# Patient Record
Sex: MALE | Race: WHITE | Employment: FULL TIME | URBAN - METROPOLITAN AREA
[De-identification: names, ages, dates, MRNs, and addresses within clinical notes are randomized per-mention and may not be internally consistent; named-entity substitution may affect disease eponyms.]

---

## 2020-06-05 ENCOUNTER — HOSPITAL ENCOUNTER (OUTPATIENT)
Dept: ULTRASOUND IMAGING | Age: 65
Discharge: HOME | End: 2020-06-07
Payer: COMMERCIAL

## 2020-06-05 ENCOUNTER — HOSPITAL ENCOUNTER (OUTPATIENT)
Age: 65
Discharge: HOME | End: 2020-06-07
Payer: COMMERCIAL

## 2020-06-05 PROCEDURE — 76536 US EXAM OF HEAD AND NECK: CPT

## 2020-06-30 ENCOUNTER — TELEPHONE (OUTPATIENT)
Dept: CARDIOLOGY | Age: 65
End: 2020-06-30

## 2020-07-01 ENCOUNTER — HOSPITAL ENCOUNTER (OUTPATIENT)
Dept: CARDIOLOGY | Age: 65
Discharge: HOME | End: 2020-07-01
Payer: COMMERCIAL

## 2020-07-01 LAB
LV EF: 65 %
LVEF MODALITY: NORMAL

## 2020-07-01 PROCEDURE — 93306 TTE W/DOPPLER COMPLETE: CPT | Performed by: PSYCHIATRY & NEUROLOGY

## 2020-07-28 ENCOUNTER — OFFICE VISIT (OUTPATIENT)
Dept: CARDIOLOGY CLINIC | Age: 65
End: 2020-07-28
Payer: COMMERCIAL

## 2020-07-28 VITALS
HEART RATE: 56 BPM | HEIGHT: 70 IN | BODY MASS INDEX: 23.48 KG/M2 | SYSTOLIC BLOOD PRESSURE: 114 MMHG | RESPIRATION RATE: 18 BRPM | WEIGHT: 164 LBS | DIASTOLIC BLOOD PRESSURE: 76 MMHG

## 2020-07-28 PROBLEM — I35.0 NONRHEUMATIC AORTIC VALVE STENOSIS: Status: ACTIVE | Noted: 2020-07-28

## 2020-07-28 PROCEDURE — 99244 OFF/OP CNSLTJ NEW/EST MOD 40: CPT | Performed by: INTERNAL MEDICINE

## 2020-07-28 PROCEDURE — 93000 ELECTROCARDIOGRAM COMPLETE: CPT | Performed by: INTERNAL MEDICINE

## 2020-07-28 RX ORDER — FLUTICASONE PROPIONATE 50 MCG
SPRAY, SUSPENSION (ML) NASAL
COMMUNITY
Start: 2020-07-16 | End: 2021-08-26 | Stop reason: CLARIF

## 2020-07-28 RX ORDER — ADALIMUMAB 40MG/0.4ML
KIT SUBCUTANEOUS
COMMUNITY
Start: 2020-07-10 | End: 2021-08-26 | Stop reason: CLARIF

## 2020-07-28 RX ORDER — AZELASTINE 1 MG/ML
SPRAY, METERED NASAL
COMMUNITY
Start: 2020-07-16 | End: 2021-08-26 | Stop reason: CLARIF

## 2020-07-28 RX ORDER — VITAMIN B COMPLEX
1 CAPSULE ORAL PRN
COMMUNITY
End: 2022-04-11 | Stop reason: CLARIF

## 2020-07-28 NOTE — PROGRESS NOTES
Patient Active Problem List   Diagnosis    Nonrheumatic aortic valve stenosis       Current Outpatient Medications   Medication Sig Dispense Refill    HUMIRA PEN 40 MG/0.4ML PNKT every 14 days       fluticasone (FLONASE) 50 MCG/ACT nasal spray SPRAY 2 SPRAYS INTO EACH NOSTRIL EVERY DAY      azelastine (ASTELIN) 0.1 % nasal spray USE 2 SPRAYS/NOSTRIL TWICE DAILY      b complex vitamins capsule Take 1 capsule by mouth as needed Only takes occasionally       No current facility-administered medications for this visit. CC:    Patient is seen in Initial Evaluation for:  1. Nonrheumatic aortic valve stenosis        HPI:  Patient seen and initial evaluation after recent finding of moderate to severe aortic valve stenosis on echocardiography. Patient was found to have murmur of significant aortic valve stenosis on examination. Patient is doing well without any specific cardiac problems. Patient denies any shortness of breath, chest pain, lightheadedness or dizziness. Patient is tolerating medications well without side effects.       ROS:   General: No unusual weight gain, no change in exercise tolerance  Skin: No rash or itching  EENT: No vision changes or nosebleeds  Cardiovascular: No orthopnea or paroxysmal nocturnal dyspnea  Respiratory: No cough or hemoptysis  Gastrointestinal: No hematemesis or recent changes in bowel habits  Genitourinary: No hematuria, urgency or frequency  Musculoskeletal: No muscular weakness or joint swelling   Neurologic / Psychiatric: No incoordination or convulsions  Allergic / Immunologic/ Lymphatic / Endocrine: No anemia or bleeding tendency    Social History     Socioeconomic History    Marital status: Single     Spouse name: Not on file    Number of children: Not on file    Years of education: Not on file    Highest education level: Not on file   Occupational History    Not on file   Social Needs    Financial resource strain: Not on file    Food insecurity     Worry: Not on file     Inability: Not on file    Transportation needs     Medical: Not on file     Non-medical: Not on file   Tobacco Use    Smoking status: Current Some Day Smoker     Years: 20.00     Types: Cigars    Smokeless tobacco: Never Used    Tobacco comment: occasionally a cigar   Substance and Sexual Activity    Alcohol use: Yes     Comment: occasionally beer during week    Drug use: Never    Sexual activity: Not on file   Lifestyle    Physical activity     Days per week: Not on file     Minutes per session: Not on file    Stress: Not on file   Relationships    Social connections     Talks on phone: Not on file     Gets together: Not on file     Attends Jew service: Not on file     Active member of club or organization: Not on file     Attends meetings of clubs or organizations: Not on file     Relationship status: Not on file    Intimate partner violence     Fear of current or ex partner: Not on file     Emotionally abused: Not on file     Physically abused: Not on file     Forced sexual activity: Not on file   Other Topics Concern    Not on file   Social History Narrative    Not on file       Family History   Problem Relation Age of Onset    Cancer Mother         breast    Heart Disease Father      PMH:  Psoriasis    PHYSICAL EXAM:  CONSTITUTIONAL:  Well developed, well nourished    Vitals:    07/28/20 0755   BP: 114/76   Pulse: 56   Resp: 18   Weight: 164 lb (74.4 kg)   Height: 5' 10\" (1.778 m)     HEAD & FACE: Normocephalic. Symmetric. EYES: No xanthelasma. Conjunctivae not injected. EARS, NOSE, MOUTH & THROAT: Good dentition. No oral pallor or cyanosis. NECK: No JVD at 30 degrees. No thyromegaly. RESPIRATORY: Clear to auscultation and percussion in all fields. No use of accessory muscle or intercostal retractions. CARDIOVASCULAR: Regular rate and rhythm. No lifts or thrills on palpitation. Auscultation with normal S1-S2 in intensity and splitting.   Grade 2/6 late peaking

## 2021-02-26 ENCOUNTER — OFFICE VISIT (OUTPATIENT)
Dept: CARDIOLOGY CLINIC | Age: 66
End: 2021-02-26
Payer: COMMERCIAL

## 2021-02-26 VITALS
HEART RATE: 69 BPM | RESPIRATION RATE: 16 BRPM | BODY MASS INDEX: 24.45 KG/M2 | DIASTOLIC BLOOD PRESSURE: 64 MMHG | SYSTOLIC BLOOD PRESSURE: 112 MMHG | HEIGHT: 70 IN | OXYGEN SATURATION: 97 % | WEIGHT: 170.8 LBS

## 2021-02-26 DIAGNOSIS — I35.0 NONRHEUMATIC AORTIC VALVE STENOSIS: Primary | ICD-10-CM

## 2021-02-26 PROCEDURE — 99213 OFFICE O/P EST LOW 20 MIN: CPT | Performed by: INTERNAL MEDICINE

## 2021-02-26 PROCEDURE — 93000 ELECTROCARDIOGRAM COMPLETE: CPT | Performed by: INTERNAL MEDICINE

## 2021-02-26 NOTE — PROGRESS NOTES
Patient Active Problem List   Diagnosis    Nonrheumatic aortic valve stenosis       Current Outpatient Medications   Medication Sig Dispense Refill    HUMIRA PEN 40 MG/0.4ML PNKT every 14 days       b complex vitamins capsule Take 1 capsule by mouth as needed Only takes occasionally      fluticasone (FLONASE) 50 MCG/ACT nasal spray SPRAY 2 SPRAYS INTO EACH NOSTRIL EVERY DAY      azelastine (ASTELIN) 0.1 % nasal spray USE 2 SPRAYS/NOSTRIL TWICE DAILY       No current facility-administered medications for this visit. CC:    Patient is seen in Initial Evaluation for:  1. Nonrheumatic aortic valve stenosis        HPI:  Patient is doing well without any specific cardiac problems. Patient denies any shortness of breath, chest pain, lightheadedness or dizziness. Patient is tolerating medications well without side effects.       ROS:   General: No unusual weight gain, no change in exercise tolerance  Skin: No rash or itching  EENT: No vision changes or nosebleeds  Cardiovascular: No orthopnea or paroxysmal nocturnal dyspnea  Respiratory: No cough or hemoptysis  Gastrointestinal: No hematemesis or recent changes in bowel habits  Genitourinary: No hematuria, urgency or frequency  Musculoskeletal: No muscular weakness or joint swelling   Neurologic / Psychiatric: No incoordination or convulsions  Allergic / Immunologic/ Lymphatic / Endocrine: No anemia or bleeding tendency    Social History     Socioeconomic History    Marital status: Single     Spouse name: Not on file    Number of children: Not on file    Years of education: Not on file    Highest education level: Not on file   Occupational History    Not on file   Social Needs    Financial resource strain: Not on file    Food insecurity     Worry: Not on file     Inability: Not on file    Transportation needs     Medical: Not on file     Non-medical: Not on file   Tobacco Use    Smoking status: Current Some Day Smoker     Years: 20.00     Types: Cigars  Smokeless tobacco: Never Used    Tobacco comment: occasionally a cigar   Substance and Sexual Activity    Alcohol use: Yes     Comment: occasionally beer during week    Drug use: Never    Sexual activity: Not on file   Lifestyle    Physical activity     Days per week: Not on file     Minutes per session: Not on file    Stress: Not on file   Relationships    Social connections     Talks on phone: Not on file     Gets together: Not on file     Attends Yazdanism service: Not on file     Active member of club or organization: Not on file     Attends meetings of clubs or organizations: Not on file     Relationship status: Not on file    Intimate partner violence     Fear of current or ex partner: Not on file     Emotionally abused: Not on file     Physically abused: Not on file     Forced sexual activity: Not on file   Other Topics Concern    Not on file   Social History Narrative    Not on file       Family History   Problem Relation Age of Onset    Cancer Mother         breast    Heart Disease Father      PMH:  Psoriasis    PHYSICAL EXAM:  CONSTITUTIONAL:  Well developed, well nourished    Vitals:    02/26/21 1349   BP: 112/64   Pulse: 69   Resp: 16   SpO2: 97%   Weight: 170 lb 12.8 oz (77.5 kg)   Height: 5' 10\" (1.778 m)     HEAD & FACE: Normocephalic. Symmetric. EYES: No xanthelasma. Conjunctivae not injected. EARS, NOSE, MOUTH & THROAT: Good dentition. No oral pallor or cyanosis. NECK: No JVD at 30 degrees. No thyromegaly. RESPIRATORY: Clear to auscultation and percussion in all fields. No use of accessory muscle or intercostal retractions. CARDIOVASCULAR: Regular rate and rhythm. No lifts or thrills on palpitation. Auscultation with normal S1-S2 in intensity and splitting. Grade 2/6 late peaking systolic murmur noted best at right upper sternal border. No carotid bruits. Abdominal aorta not enlarged. Femoral arteries without bruits. Pedal pulses 2+. No edema.     ABDOMEN: Soft without hepatic or splenic enlargement. No tenderness. MUSCULOSKELETAL: No kyphosis or scoliosis of the back. Good muscle strength and tone. No muscle atrophy. Normal gait and ability to undergo exercise stress testing. EXTREMITIES: No clubbing or cyanosis. SKIN: No Xanthomas or ulcerations. NEUROLOGIC: Oriented to time, place and person. Normal mood and affect. LYMPHATIC:  No palpable neck or supraclavicular nodes. No splenomegaly. EKG: the EKG tracing was reviewed and found to reveal: Normal sinus rhythm. ASSESSMENT:                                                     ORDERS:     Diagnosis Orders   1. Nonrheumatic aortic valve stenosis  EKG 12 Lead     PLAN:   See above orders. Medication reconciliation completed. Discussed issues that would prompt earlier evaluation. Same cardiac medications. Follow-up office visit in 6 month.

## 2021-08-26 ENCOUNTER — OFFICE VISIT (OUTPATIENT)
Dept: CARDIOLOGY CLINIC | Age: 66
End: 2021-08-26
Payer: COMMERCIAL

## 2021-08-26 VITALS
BODY MASS INDEX: 23.1 KG/M2 | WEIGHT: 165 LBS | HEART RATE: 58 BPM | RESPIRATION RATE: 14 BRPM | DIASTOLIC BLOOD PRESSURE: 68 MMHG | SYSTOLIC BLOOD PRESSURE: 118 MMHG | HEIGHT: 71 IN

## 2021-08-26 DIAGNOSIS — I35.0 NONRHEUMATIC AORTIC VALVE STENOSIS: Primary | ICD-10-CM

## 2021-08-26 PROCEDURE — 99213 OFFICE O/P EST LOW 20 MIN: CPT | Performed by: INTERNAL MEDICINE

## 2021-08-26 PROCEDURE — 93000 ELECTROCARDIOGRAM COMPLETE: CPT | Performed by: INTERNAL MEDICINE

## 2021-08-26 NOTE — PROGRESS NOTES
Patient Active Problem List   Diagnosis    Nonrheumatic aortic valve stenosis       Current Outpatient Medications   Medication Sig Dispense Refill    b complex vitamins capsule Take 1 capsule by mouth as needed Only takes occasionally      HUMIRA PEN 40 MG/0.4ML PNKT every 14 days  (Patient not taking: Reported on 8/26/2021)      fluticasone (FLONASE) 50 MCG/ACT nasal spray SPRAY 2 SPRAYS INTO EACH NOSTRIL EVERY DAY (Patient not taking: Reported on 8/26/2021)      azelastine (ASTELIN) 0.1 % nasal spray USE 2 SPRAYS/NOSTRIL TWICE DAILY (Patient not taking: Reported on 8/26/2021)       No current facility-administered medications for this visit. CC:    Patient is seen in follow up for:  1. Nonrheumatic aortic valve stenosis - moderate to severe        HPI:  Patient is doing well without any specific cardiac problems. Patient denies any shortness of breath, chest pain, lightheadedness or dizziness. Patient is tolerating medications well without side effects.       ROS:   General: No unusual weight gain, no change in exercise tolerance  Skin: No rash or itching  EENT: No vision changes or nosebleeds  Cardiovascular: No orthopnea or paroxysmal nocturnal dyspnea  Respiratory: No cough or hemoptysis  Gastrointestinal: No hematemesis or recent changes in bowel habits  Genitourinary: No hematuria, urgency or frequency  Musculoskeletal: No muscular weakness or joint swelling   Neurologic / Psychiatric: No incoordination or convulsions  Allergic / Immunologic/ Lymphatic / Endocrine: No anemia or bleeding tendency    Social History     Socioeconomic History    Marital status: Single     Spouse name: Not on file    Number of children: Not on file    Years of education: Not on file    Highest education level: Not on file   Occupational History    Not on file   Tobacco Use    Smoking status: Current Some Day Smoker     Years: 20.00     Types: Cigars    Smokeless tobacco: Never Used    Tobacco comment: occasionally a cigar   Vaping Use    Vaping Use: Never used   Substance and Sexual Activity    Alcohol use: Yes     Comment: occasionally beer during week    Drug use: Never    Sexual activity: Not on file   Other Topics Concern    Not on file   Social History Narrative    Not on file     Social Determinants of Health     Financial Resource Strain:     Difficulty of Paying Living Expenses:    Food Insecurity:     Worried About Running Out of Food in the Last Year:     [address] in the Last Year:    Transportation Needs:     Lack of Transportation (Medical):  Lack of Transportation (Non-Medical):    Physical Activity:     Days of Exercise per Week:     Minutes of Exercise per Session:    Stress:     Feeling of Stress :    Social Connections:     Frequency of Communication with Friends and Family:     Frequency of Social Gatherings with Friends and Family:     Attends Mandaeism Services:     Active Member of Clubs or Organizations:     Attends Club or Organization Meetings:     Marital Status:    Intimate Partner Violence:     Fear of Current or Ex-Partner:     Emotionally Abused:     Physically Abused:     Sexually Abused:        Family History   Problem Relation Age of Onset    Cancer Mother         breast    Heart Disease Father      PMH:  Psoriasis    PHYSICAL EXAM:  CONSTITUTIONAL:  Well developed, well nourished    Vitals:    08/26/21 0922   BP: 118/68   Pulse: 58   Resp: 14   Weight: 165 lb (74.8 kg)   Height: 5' 11\" (1.803 m)     HEAD & FACE: Normocephalic. Symmetric. EYES: No xanthelasma. Conjunctivae not injected. EARS, NOSE, MOUTH & THROAT: Good dentition. No oral pallor or cyanosis. NECK: No JVD at 30 degrees. No thyromegaly. RESPIRATORY: Clear to auscultation and percussion in all fields. No use of accessory muscle or intercostal retractions. CARDIOVASCULAR: Regular rate and rhythm. No lifts or thrills on palpitation.   Auscultation with normal S1-S2 in intensity and splitting. Grade 2/6 late peaking systolic murmur noted best at right upper sternal border. No carotid bruits. Abdominal aorta not enlarged. Femoral arteries without bruits. Pedal pulses 2+. No edema. ABDOMEN: Soft without hepatic or splenic enlargement. No tenderness. MUSCULOSKELETAL: No kyphosis or scoliosis of the back. Good muscle strength and tone. No muscle atrophy. Normal gait and ability to undergo exercise stress testing. EXTREMITIES: No clubbing or cyanosis. SKIN: No Xanthomas or ulcerations. NEUROLOGIC: Oriented to time, place and person. Normal mood and affect. LYMPHATIC:  No palpable neck or supraclavicular nodes. No splenomegaly. EKG: the EKG tracing was reviewed and found to reveal: Normal sinus rhythm.  ms. No change compared to prior tracing. ASSESSMENT:                                                     ORDERS:     Diagnosis Orders   1. Nonrheumatic aortic valve stenosis - moderate to severe  EKG 12 lead    ECHO COMPLETE     PLAN:   See above orders. Medication reconciliation completed. Discussed issues that would prompt earlier evaluation. Same cardiac medications. Follow-up office visit in 12 month.

## 2021-11-30 ENCOUNTER — TELEPHONE (OUTPATIENT)
Dept: CARDIOLOGY | Age: 66
End: 2021-11-30

## 2021-11-30 NOTE — TELEPHONE ENCOUNTER
CALLED PATIENT AND LEFT MESSAGE TO SCHEDULE ECHO  Electronically signed by Teo Isabel on 11/30/2021 at 11:57 AM

## 2021-12-15 DIAGNOSIS — I35.0 NONRHEUMATIC AORTIC VALVE STENOSIS: Primary | ICD-10-CM

## 2021-12-16 ENCOUNTER — TELEPHONE (OUTPATIENT)
Dept: CARDIOLOGY | Age: 66
End: 2021-12-16

## 2021-12-16 NOTE — TELEPHONE ENCOUNTER
CALLED PATIENT TO SCHEDULE TREADMILL STRESS TEST FOR 12-17-21. REVIEWED COVID CHECKLIST WITH PATIENT.     Electronically signed by Ilia Bear on 12/16/2021 at 8:55 AM

## 2021-12-17 ENCOUNTER — HOSPITAL ENCOUNTER (OUTPATIENT)
Dept: CARDIOLOGY | Age: 66
Discharge: HOME | End: 2021-12-17
Payer: COMMERCIAL

## 2021-12-17 DIAGNOSIS — I35.0 NONRHEUMATIC AORTIC VALVE STENOSIS: ICD-10-CM

## 2021-12-17 DIAGNOSIS — I35.0 NONRHEUMATIC AORTIC VALVE STENOSIS: Primary | ICD-10-CM

## 2021-12-17 LAB
LV EF: 60 %
LVEF MODALITY: NORMAL

## 2021-12-17 PROCEDURE — 93306 TTE W/DOPPLER COMPLETE: CPT

## 2022-01-11 NOTE — PROGRESS NOTES
[facility] CLIN[facility] Valve Clinic  Visit Note      Patient name: Arnel Don    Reason for consult: Non rheumatic aortic valve stenosis     Referring Physician:     Primary Care Physician: Ysabel Osborne MD    Date of service: 1/11/2022    Chief Complaint: AS    HPI: Mr. Taylor Robertson is a 61year old male with past medical history of  Aortic stenosis and psoriasis presents to the valve clinic for evaluation and recommendation of his non rheumatic aortic valve stenosis. 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 Patient currently denies any specific cardiac problems. Patient denies any shortness of breath, chest pain, lightheadedness or dizziness, pre-syncope or syncope or palpitations. Allergies: No Known Allergies    Home medications:    Current Outpatient Medications   Medication Sig Dispense Refill    b complex vitamins capsule Take 1 capsule by mouth as needed Only takes occasionally       No current facility-administered medications for this visit.        Past Medical History:  Past Medical History:   Diagnosis Date    Aortic stenosis 2021       Past Surgical History:  Past Surgical History:   Procedure Laterality Date    HERNIA REPAIR      8-10 years ago ([id number])       Social History:  Social History     Socioeconomic History    Marital status: Single     Spouse name: Not on file    Number of children: Not on file    Years of education: Not on file    Highest education level: Not on file   Occupational History    Not on file   Tobacco Use    Smoking status: Current Some Day Smoker     Years: 20.00     Types: Cigars    Smokeless tobacco: Never Used    Tobacco comment: occasionally a cigar   Vaping Use    Vaping Use: Never used   Substance and Sexual Activity    Alcohol use: Yes     Comment: occasionally beer during week    Drug use: Never    Sexual activity: Not on file   Other Topics Concern    Not on file   Social History Narrative    Not on file     Social Determinants of Health     Financial Resource Strain:     Difficulty of Paying Living Expenses: Not on file   Food Insecurity:     Worried About Running Out of Food in the Last Year: Not on file    Yael of Food in the Last Year: Not on file   Transportation Needs:     Lack of Transportation (Medical): Not on file    Lack of Transportation (Non-Medical): Not on file   Physical Activity:     Days of Exercise per Week: Not on file    Minutes of Exercise per Session: Not on file   Stress:     Feeling of Stress : Not on file   Social Connections:     Frequency of Communication with Friends and Family: Not on file    Frequency of Social Gatherings with Friends and Family: Not on file    Attends Mandaeism Services: Not on file    Active Member of [address] [place] [address] or Organizations: Not on file    Attends Club or Organization Meetings: Not on file    Marital Status: Not on file   Intimate Partner Violence:     Fear of Current or Ex-Partner: Not on file    Emotionally Abused: Not on file    Physically Abused: Not on file    Sexually Abused: Not on file   Housing Stability:     Unable to Pay for Housing in the Last Year: Not on file    Number of [place] in the Last Year: Not on file    Unstable Housing in the Last Year: Not on file       Family History:  Family History   Problem Relation Age of Onset    Cancer Mother         breast    Heart Disease Father        Review of Systems:  Constitutional: Denies fevers, chills, or weight loss. HEENT: Denies visual changes or hearing loss. Heart: As per HPI. Lungs: Denies shortness of breath, cough, or wheezing. Gastrointestinal: Denies nausea, vomiting, constipation, or diarrhea.    Genitourinary: dysuria or hematuria. Psychiatric: Patient denies anxiety or depression. Neurologic: Patient denies weakness of the extremities, dizziness, or headaches. All other ROS checked and found to be negative. Objective:  General Appearance: Pleasant 61y.o. year old male who appears stated age. Communicates well, no acute distress. HEENT: Head is normocephalic, atraumatic. EOMs intact, PERRL. Trachea midline. Lungs: Normal respiratory rate and normal effort. He is not in respiratory distress. Breath sounds clear to auscultation. No wheezes. Heart: Normal rate. Regular rhythm. S1 normal and S2 normal. Positive for murmur. Chest: Symmetric chest wall expansion. Extremities: Normal range of motion. Neurological: Patient is alert and oriented to person, place and time. Patient has normal reflexes. Skin: Warm and dry. Abdomen: Abdomen is soft and non-distended. Bowel sounds are normal. There is no abdominal tenderness tenderness. There is no guarding. There is no mass. Pulses: Distal pulses are intact. Skin: Warm and dry without lesions. Assessment:   Patient Active Problem List   Diagnosis    Nonrheumatic aortic valve stenosis               Plan: After extensive questioning he does admit to be slowing down and cannot do what he was doing 2-3 years ago and he attributed that to being 61. Heart cath, dental clinic (he states several teeth will need pulled). Likely will be best served with SAVR but will defer to the heart team discussion.

## 2022-01-13 ENCOUNTER — OFFICE VISIT (OUTPATIENT)
Dept: CARDIOLOGY CLINIC | Age: 67
End: 2022-01-13
Payer: COMMERCIAL

## 2022-01-13 ENCOUNTER — OFFICE VISIT (OUTPATIENT)
Dept: CARDIOTHORACIC SURGERY | Age: 67
End: 2022-01-13
Payer: COMMERCIAL

## 2022-01-13 VITALS
HEIGHT: 71 IN | DIASTOLIC BLOOD PRESSURE: 91 MMHG | TEMPERATURE: 98.2 F | RESPIRATION RATE: 20 BRPM | SYSTOLIC BLOOD PRESSURE: 199 MMHG | BODY MASS INDEX: 23.1 KG/M2 | HEART RATE: 91 BPM | WEIGHT: 165 LBS

## 2022-01-13 DIAGNOSIS — D84.821 IMMUNOSUPPRESSION DUE TO DRUG THERAPY (HCC): ICD-10-CM

## 2022-01-13 DIAGNOSIS — I35.0 SEVERE AORTIC STENOSIS: Primary | ICD-10-CM

## 2022-01-13 DIAGNOSIS — Q23.1 BICUSPID AORTIC VALVE: ICD-10-CM

## 2022-01-13 DIAGNOSIS — I35.0 NONRHEUMATIC AORTIC VALVE STENOSIS: Primary | ICD-10-CM

## 2022-01-13 DIAGNOSIS — Z79.899 IMMUNOSUPPRESSION DUE TO DRUG THERAPY (HCC): ICD-10-CM

## 2022-01-13 DIAGNOSIS — L40.9 PSORIASIS: ICD-10-CM

## 2022-01-13 PROBLEM — Q23.81 BICUSPID AORTIC VALVE: Status: ACTIVE | Noted: 2022-01-13

## 2022-01-13 PROCEDURE — 99204 OFFICE O/P NEW MOD 45 MIN: CPT | Performed by: THORACIC SURGERY (CARDIOTHORACIC VASCULAR SURGERY)

## 2022-01-13 PROCEDURE — 99205 OFFICE O/P NEW HI 60 MIN: CPT | Performed by: INTERNAL MEDICINE

## 2022-01-13 NOTE — PATIENT INSTRUCTIONS
Jalen Jones will be in touch to schedule your heart catheterization    We will be in touch with dental clinic for clearance prior to heart valve surgery    You will see Dr. Lizzette Shepard to discuss surgery after above is complete

## 2022-01-13 NOTE — PROGRESS NOTES
[address] [facility]        Patient name: Zienab Andrews    Reason for consult: AS    Referring Physician: Dr. Cristal Coleman    Primary Care Physician: Monica Flynn MD    Date of service: 1/13/2022    Chief Complaint: fatigue    HPI:   This is a pleasant 81-year-old  male who presents as a new patient. Compared to 1 to 2 years ago he notices that he has slowed down in terms of physical activity especially heavy lifting. Otherwise he denies dyspnea, chest pain, syncope, presyncope, lower extremity edema, orthopnea, PND    He is on adalimumab for plaque psoriasis. He is meeting his rheumatologist on January 21. Allergies: No Known Allergies    Home medications:    Current Outpatient Medications   Medication Sig Dispense Refill    adalimumab (HUMIRA) 40 MG/0.8ML injection Inject 40 mg into the skin once Indications: every 2 weeks      b complex vitamins capsule Take 1 capsule by mouth as needed Only takes occasionally       No current facility-administered medications for this visit.        Past Medical History:  Past Medical History:   Diagnosis Date    Aortic stenosis 2021       Past Surgical History:  Past Surgical History:   Procedure Laterality Date    HERNIA REPAIR      8-10 years ago ([id number])       Social History:  Social History     Socioeconomic History    Marital status: Single     Spouse name: Not on file    Number of children: Not on file    Years of education: Not on file    Highest education level: Not on file   Occupational History    Not on file   Tobacco Use    Smoking status: Current Some Day Smoker     Years: 20.00     Types: Cigars    Smokeless tobacco: Never Used    Tobacco comment: occasionally a cigar   Vaping Use    Vaping Use: Never used   Substance and Sexual Activity    Alcohol use: Yes     Comment: occasionally beer during week    Drug use: Never    Sexual activity: Not on file   Other Topics Concern    Not on file   Social History Narrative    Not on file     Social Determinants of Health     Financial Resource Strain:     Difficulty of Paying Living Expenses: Not on file   Food Insecurity:     Worried About Running Out of Food in the Last Year: Not on file    Yael of Food in the Last Year: Not on file   Transportation Needs:     Lack of Transportation (Medical): Not on file    Lack of Transportation (Non-Medical): Not on file   Physical Activity:     Days of Exercise per Week: Not on file    Minutes of Exercise per Session: Not on file   Stress:     Feeling of Stress : Not on file   Social Connections:     Frequency of Communication with Friends and Family: Not on file    Frequency of Social Gatherings with Friends and Family: Not on file    Attends [employer] Services: Not on file    Active Member of [address] [place] [address] [employer] or Organizations: Not on file    Attends Club or Organization Meetings: Not on file    Marital Status: Not on file   Intimate Partner Violence:     Fear of Current or Ex-Partner: Not on file    Emotionally Abused: Not on file    Physically Abused: Not on file    Sexually Abused: Not on file   Housing Stability:     Unable to Pay for Housing in the Last Year: Not on file    Number of [place] in the Last Year: Not on file    Unstable Housing in the Last Year: Not on file       Family History:  Family History   Problem Relation Age of Onset    Cancer Mother         breast    Heart Disease Father        Review of Systems:  Constitutional: Denies fevers, chills, or weight loss. HEENT: Denies visual changes or hearing loss. Heart: As per HPI. Lungs: Denies shortness of breath, cough, or wheezing. Gastrointestinal: Denies nausea, vomiting, constipation, or diarrhea. Genitourinary: Denies dysuria or hematuria. Psychiatric: Patient denies anxiety or depression. Neurologic: Patient denies weakness of the extremities, dizziness, or headaches.   All other ROS checked and found to be This will be definitively decided at the heart team meeting after obtaining all diagnostic data especially coronary angiogram.  We will also obtain records from dermatology regarding his psoriasis and adalimumab therapy. Plan:   · Diagnostic coronary angiogram and surgical work-up then heart team discussion        Seen and discussed in association with Dr. Haleigh Barahona MD of cardiothoracic surgery.     Marshall Mckeon MD, [facility]  Interventional Cardiology/Structural Heart Disease  Office: [phone]  Structural Heart Coordinator: Ellie Noble PA-C    Electronically signed by Marshall Mckeon MD on 1/13/2022 at 3:40 PM

## 2022-01-17 DIAGNOSIS — I35.0 NODULAR CALCIFIC AORTIC VALVE STENOSIS: ICD-10-CM

## 2022-01-17 DIAGNOSIS — R09.89 SYMPTOMS INVOLVING CARDIOVASCULAR SYSTEM: Primary | ICD-10-CM

## 2022-01-17 DIAGNOSIS — I73.9 PVD (PERIPHERAL VASCULAR DISEASE) (HCC): ICD-10-CM

## 2022-01-18 DIAGNOSIS — Z01.810 PREOPERATIVE CARDIOVASCULAR EXAMINATION: Primary | ICD-10-CM

## 2022-01-18 DIAGNOSIS — I35.0 SEVERE AORTIC STENOSIS: ICD-10-CM

## 2022-01-18 DIAGNOSIS — Q23.1 BICUSPID AORTIC VALVE: ICD-10-CM

## 2022-01-19 ENCOUNTER — TELEPHONE (OUTPATIENT)
Dept: CARDIOLOGY CLINIC | Age: 67
End: 2022-01-19

## 2022-01-19 NOTE — TELEPHONE ENCOUNTER
Pt called stating he received a call from [employer] to schedule a carotid US, PFT and anther test he was uncertain about. He states he was not told about the need for any testing other than the heart catheterization. He states he will not schedule any other tests until he understands why they are necessary. Please call the pt.

## 2022-01-21 ENCOUNTER — TELEPHONE (OUTPATIENT)
Dept: CARDIOLOGY CLINIC | Age: 67
End: 2022-01-21

## 2022-01-21 NOTE — TELEPHONE ENCOUNTER
Pt called back to get clarity on the carotid US, PFT and other test. He can be reached at [phone]

## 2022-01-21 NOTE — TELEPHONE ENCOUNTER
Per Bar[place]so Single on 1/21/22    Called him back and had to leave a vm. Asked him to call me back directly if he has any specific questions. Gave him my phone number. Thanks!

## 2022-01-21 NOTE — TELEPHONE ENCOUNTER
Called him back and had to leave a vm. Asked him to call me back directly if he has any specific questions. Gave him my phone number. Thanks!

## 2022-01-31 ENCOUNTER — HOSPITAL ENCOUNTER (OUTPATIENT)
Age: 67
Discharge: HOME | End: 2022-02-02
Payer: COMMERCIAL

## 2022-01-31 DIAGNOSIS — I35.0 SEVERE AORTIC STENOSIS: ICD-10-CM

## 2022-01-31 DIAGNOSIS — Q23.1 BICUSPID AORTIC VALVE: ICD-10-CM

## 2022-01-31 DIAGNOSIS — Z01.810 PREOPERATIVE CARDIOVASCULAR EXAMINATION: ICD-10-CM

## 2022-01-31 LAB — SARS-COV-2, PCR: NOT DETECTED

## 2022-01-31 PROCEDURE — U0003 INFECTIOUS AGENT DETECTION BY NUCLEIC ACID (DNA OR RNA); SEVERE ACUTE RESPIRATORY SYNDROME CORONAVIRUS 2 (SARS-COV-2) (CORONAVIRUS DISEASE [COVID-19]), AMPLIFIED PROBE TECHNIQUE, MAKING USE OF HIGH THROUGHPUT TECHNOLOGIES AS DESCRIBED BY CMS-2020-01-R: HCPCS

## 2022-01-31 PROCEDURE — U0005 INFEC AGEN DETEC AMPLI PROBE: HCPCS

## 2022-02-01 ENCOUNTER — HOSPITAL ENCOUNTER (OUTPATIENT)
Age: 67
Discharge: HOME | End: 2022-02-01
Payer: COMMERCIAL

## 2022-02-01 DIAGNOSIS — I35.0 SEVERE AORTIC STENOSIS: ICD-10-CM

## 2022-02-01 DIAGNOSIS — Q23.1 BICUSPID AORTIC VALVE: ICD-10-CM

## 2022-02-01 DIAGNOSIS — Z01.810 PREOPERATIVE CARDIOVASCULAR EXAMINATION: ICD-10-CM

## 2022-02-01 LAB
ANION GAP SERPL CALCULATED.3IONS-SCNC: 8 MMOL/L (ref 7–16)
BUN BLDV-MCNC: 14 MG/DL (ref 6–23)
CALCIUM SERPL-MCNC: 9.1 MG/DL (ref 8.6–10.2)
CHLORIDE BLD-SCNC: 98 MMOL/L (ref 98–107)
CO2: 29 MMOL/L (ref 22–29)
CREAT SERPL-MCNC: 0.9 MG/DL (ref 0.7–1.2)
GFR AFRICAN AMERICAN: >60
GFR NON-AFRICAN AMERICAN: >60 ML/MIN/1.73
GLUCOSE BLD-MCNC: 87 MG/DL (ref 74–99)
HCT VFR BLD CALC: 44.6 % (ref 37–54)
HEMOGLOBIN: 15.2 G/DL (ref 12.5–16.5)
INR BLD: 1
MCH RBC QN AUTO: 31 PG (ref 26–35)
MCHC RBC AUTO-ENTMCNC: 34.1 % (ref 32–34.5)
MCV RBC AUTO: 90.8 FL (ref 80–99.9)
PDW BLD-RTO: 13.2 FL (ref 11.5–15)
PLATELET # BLD: 158 E9/L (ref 130–450)
PMV BLD AUTO: 11.5 FL (ref 7–12)
POTASSIUM SERPL-SCNC: 4.2 MMOL/L (ref 3.5–5)
PROTHROMBIN TIME: 10.6 SEC (ref 9.3–12.4)
RBC # BLD: 4.91 E12/L (ref 3.8–5.8)
SODIUM BLD-SCNC: 135 MMOL/L (ref 132–146)
WBC # BLD: 4.2 E9/L (ref 4.5–11.5)

## 2022-02-01 PROCEDURE — 80048 BASIC METABOLIC PNL TOTAL CA: CPT

## 2022-02-01 PROCEDURE — 85610 PROTHROMBIN TIME: CPT

## 2022-02-01 PROCEDURE — 36415 COLL VENOUS BLD VENIPUNCTURE: CPT

## 2022-02-01 PROCEDURE — 85027 COMPLETE CBC AUTOMATED: CPT

## 2022-02-02 ENCOUNTER — HOSPITAL ENCOUNTER (OUTPATIENT)
Dept: ULTRASOUND IMAGING | Age: 67
Discharge: HOME | End: 2022-02-04
Payer: COMMERCIAL

## 2022-02-02 ENCOUNTER — HOSPITAL ENCOUNTER (OUTPATIENT)
Dept: PULMONOLOGY | Age: 67
Discharge: HOME | End: 2022-02-02
Payer: COMMERCIAL

## 2022-02-02 DIAGNOSIS — R09.89 SYMPTOMS INVOLVING CARDIOVASCULAR SYSTEM: ICD-10-CM

## 2022-02-02 DIAGNOSIS — I35.0 NODULAR CALCIFIC AORTIC VALVE STENOSIS: ICD-10-CM

## 2022-02-02 PROCEDURE — 93880 EXTRACRANIAL BILAT STUDY: CPT

## 2022-02-02 PROCEDURE — 94729 DIFFUSING CAPACITY: CPT

## 2022-02-02 PROCEDURE — 94375 RESPIRATORY FLOW VOLUME LOOP: CPT

## 2022-02-02 PROCEDURE — 93880 EXTRACRANIAL BILAT STUDY: CPT | Performed by: RADIOLOGY

## 2022-02-03 ENCOUNTER — TELEPHONE (OUTPATIENT)
Dept: CARDIAC CATH/INVASIVE PROCEDURES | Age: 67
End: 2022-02-03

## 2022-02-04 ENCOUNTER — HOSPITAL ENCOUNTER (OUTPATIENT)
Dept: CARDIAC CATH/INVASIVE PROCEDURES | Age: 67
Discharge: HOME | End: 2022-02-04
Payer: COMMERCIAL

## 2022-02-04 DIAGNOSIS — I25.10 CAD IN NATIVE ARTERY: ICD-10-CM

## 2022-02-04 LAB
ABO/RH: NORMAL
ANTIBODY SCREEN: NORMAL
CHOLESTEROL, TOTAL: 211 MG/DL (ref 0–199)
HBA1C MFR BLD: 5.1 % (ref 4–5.6)
HDLC SERPL-MCNC: 73 MG/DL
LDL CHOLESTEROL CALCULATED: 127 MG/DL (ref 0–99)
TRIGL SERPL-MCNC: 54 MG/DL (ref 0–149)
VLDLC SERPL CALC-MCNC: 11 MG/DL

## 2022-02-04 PROCEDURE — 86900 BLOOD TYPING SEROLOGIC ABO: CPT

## 2022-02-04 PROCEDURE — 80061 LIPID PANEL: CPT

## 2022-02-04 PROCEDURE — 93454 CORONARY ARTERY ANGIO S&I: CPT

## 2022-02-04 PROCEDURE — C1894 INTRO/SHEATH, NON-LASER: HCPCS

## 2022-02-04 PROCEDURE — 2709999900 HC NON-CHARGEABLE SUPPLY

## 2022-02-04 PROCEDURE — 36415 COLL VENOUS BLD VENIPUNCTURE: CPT

## 2022-02-04 PROCEDURE — C1769 GUIDE WIRE: HCPCS

## 2022-02-04 PROCEDURE — 93454 CORONARY ARTERY ANGIO S&I: CPT | Performed by: INTERNAL MEDICINE

## 2022-02-04 PROCEDURE — 86850 RBC ANTIBODY SCREEN: CPT

## 2022-02-04 PROCEDURE — 6360000002 HC RX W HCPCS

## 2022-02-04 PROCEDURE — 86901 BLOOD TYPING SEROLOGIC RH(D): CPT

## 2022-02-04 PROCEDURE — 2500000003 HC RX 250 WO HCPCS

## 2022-02-04 PROCEDURE — 83036 HEMOGLOBIN GLYCOSYLATED A1C: CPT

## 2022-02-04 RX ORDER — ROSUVASTATIN CALCIUM 10 MG/1
10 TABLET, FILM COATED ORAL DAILY
Qty: 30 TABLET | Refills: 5 | Status: ON HOLD | OUTPATIENT
Start: 2022-02-04 | End: 2022-03-11 | Stop reason: HOSPADM

## 2022-02-04 RX ORDER — ASPIRIN 81 MG/1
81 TABLET ORAL DAILY
Qty: 30 TABLET | Refills: 0 | Status: SHIPPED | OUTPATIENT
Start: 2022-02-04 | End: 2022-02-25

## 2022-02-04 NOTE — PROCEDURES
[address]                  [address] [facility],  [facility]                            CARDIAC CATHETERIZATION    PATIENT NAME: Petra Zaidi                  :        1955  MED REC NO:   [id number]                            ROOM:  ACCOUNT NO:   [de-identified]                           ADMIT DATE: 2022  PROVIDER:     Hailey Green MD    DATE OF PROCEDURE:  2022    PROCEDURE:  Selective coronary angiography. The procedure was done through right radial approach using ultrasound  guidance. The patient received intravenous Versed and intravenous fentanyl for  sedation. INDICATION:  Severe aortic stenosis. PRESSURES:  Aorta 122/73 with a mean of 94. The aortic valve appeared heavily calcified on fluoroscopy. CORONARY ANGIOGRAPHY:  LEFT MAIN:  The left main artery did not appear to have any significant  angiographic disease. LAD:  The left anterior descending artery had a long diffusely diseased  mid vessel segment with up to 70% to 80% angiographic luminal narrowing. The distal LAD had mild 40% disease. A moderate-sized diagonal branch  had 80% proximal vessel stenosis. LCX:  The left circumflex had a 70% proximal vessel stenosis before any  of its branches. The second marginal branch had around 80% ostial  narrowing. The third marginal branch had 50% distal vessel disease. The marginal branches were small vessels. RCA:  The right coronary artery is a dominant vessel. It appeared  calcified in its proximal and middle segment. It was diffusely diseased  in its proximal segment, but with no high-grade lesions. At the level  of the crux, there was an eccentric 60% to 70% (more like 70%) focal  stenosis. The distal vessel did not appear to have any significant  disease. The posterior descending artery branch was a small caliber  vessel which was diffusely diseased with up to 50% distal vessel  narrowing.   The posterolateral branch was a long, but again small  caliber vessel which had up to 80% stenosis in its proximal third. The right radial arterial sheath was removed at the end of the procedure  and a TR band was applied with adequate hemostasis and with preservation  of pulse. The patient tolerated the procedure well and left the cardiac  catheterization laboratory in a stable condition. CONCLUSIONS:  1.  Heavily calcified aortic valve (with known severe aortic stenosis on  echocardiogram). 2.  Multivessel coronary artery disease as described above.         Gene Hughes MD    D: 02/04/2022 9:00:55       T: 02/04/2022 9:03:39     [email or web address]  Job#: [id number]     Doc#: [id number]    CC:

## 2022-02-04 NOTE — OP NOTE
Operative Note      Patient: Eloy Lozano  YOB: 1955  MRN: [id number]    Date of Procedure: 2/4/22    Indication:  1. Severe AS  2. AUC score: 7  3. AUC indication: 70    Procedure: Coronary angiography    Anesthesia: Versed, Fentanyl  Time sedation was administered: 08:17. I was present in the room when sedation was administered. Procedure end time: 08:30  Time spent with face to face monitoring of moderate sedation: 28 minutes    LHC performed via right radial approach using a 6 F sheath. 2.5mg of diluted Verapamil and 200mcg of nitroglycerine administered through the sheath. 5000 U heparin administered IV. Findings:  Left main: 0%  stenosis  LAD: 80 % mid vessel stenosis. 80% stenosis of Diagonal brancch  Circumflex: 70%  Proximal  stenosis  RCA: Dominant. 60 %  Stenosis. 80% stenosis of PL branch  LV angio: not performed    Hemodynamics: Ao: 122/73 ( 94 ). Sheath removed and TR band applied. There was good hemostasis achieved and the distal pulses were intact.      Complication: None   Estimated blood loss: 10 cc  Contrast use: 40 cc    Post op diagnosis:  MV CAD    PLAN:  FU valve clinic      Electronically signed by Morgan Strauss MD on 2/4/2022 at 8:42 AM

## 2022-02-15 PROCEDURE — 94726 PLETHYSMOGRAPHY LUNG VOLUMES: CPT | Performed by: INTERNAL MEDICINE

## 2022-02-15 PROCEDURE — 94729 DIFFUSING CAPACITY: CPT | Performed by: INTERNAL MEDICINE

## 2022-02-15 PROCEDURE — 94010 BREATHING CAPACITY TEST: CPT | Performed by: INTERNAL MEDICINE

## 2022-02-15 NOTE — PROCEDURES
[address]                  [address] [facility],  [facility]                               PULMONARY FUNCTION    PATIENT NAME: Meena Jones                  :        1955  MED REC NO:   [id number]                            ROOM:  ACCOUNT NO:   [de-identified]                           ADMIT DATE: 2022  PROVIDER:     Tameka Reynolds MD    DATE OF PROCEDURE:  2022    HEIGHT:  70.    WEIGHT:  165. SPIROMETRY:  FVC 3.62 which is 81 of predicted. FEV1 is 2.79 which is  81 of predicted. FEV1/FVC 77 which is 100% of predicted. LUNG VOLUMES:  Slow vital capacity 3.61 which is 80% of predicted and  ERV 1.16 which is 101 of predicted. Diffusion capacity 88 which is 138  of predicted. IMPRESSION:  Normal spirometry, normal lung volume, normal diffusion  capacity. Clinical and radiological correlation indicated with the  patient's history.         Angle Thomas MD    D: 02/15/2022 12:00:39       T: 02/15/2022 12:03:04     [email or web address]  Job#: [id number]     Doc#: [id number]    CC:

## 2022-02-21 ASSESSMENT — ENCOUNTER SYMPTOMS
SHORTNESS OF BREATH: 0
COUGH: 0
CHEST TIGHTNESS: 0
BACK PAIN: 0
WHEEZING: 0

## 2022-02-21 NOTE — PROGRESS NOTES
Subjective:      Patient ID: Mariah Johansen is a 61 y.o. male. Chief Complaint   Patient presents with    Consultation     discuss surgery       HPI   This is a 61year old male who presents to the office today to discuss surgery. PMH includes CAD, aortic stenosis, and plaque psoriasis. Of note, the patient takes Humira and follows with a rheumatologist. He is known to our practice and was last seen in valve clinic where we discussed surgical intervention of aortic stenosis. The patient admitted to slowing down over the last 2-3 years, which prompted further workup. Since our last visit, he underwent LHC which revealed MVCAD and a known heavily calcified aortic valve. Additional studies included carotid ultrasound and PFTs. Bilateral carotid ultrasound revealed no significant stenosis bilaterally. PFTs revealed normal spirometry with FEVI of 81% predicted and DLCO of 138% predicted. The patient is to also obtain dental clearance as he says he will need multiple teeth pulled. He denies CP, SOB, dyspnea, syncope or near syncope and lower extremity edema. Review of Systems   Constitutional: Positive for activity change. Negative for diaphoresis, fatigue and fever. Respiratory: Negative for cough, chest tightness, shortness of breath and wheezing. Cardiovascular: Negative for palpitations. See HPI   Musculoskeletal: Negative for back pain. Neurological: Negative for dizziness, weakness and light-headedness. Objective:   Physical Exam  Constitutional:       Appearance: Normal appearance. Cardiovascular:      Rate and Rhythm: Normal rate and regular rhythm. Pulses: Normal pulses. Heart sounds: Normal heart sounds. Pulmonary:      Effort: Pulmonary effort is normal.      Breath sounds: Normal breath sounds. Musculoskeletal:      Right lower leg: No edema. Left lower leg: No edema. Skin:     General: Skin is warm and dry.    Neurological:      Mental Status: He is alert and oriented to person, place, and time. Psychiatric:         Mood and Affect: Mood normal.         Behavior: Behavior normal.         Assessment:      AS/CAD      Plan:      AVR with CABG to LAD, RCA, maybe diag, maybe OM. PAT on 3/3 with OR 3/7 at 7am.  All risks, benefits, alternatives and potential complications explained thoroughly including, but not limited to, bleeding, infection, lung injury, kidney injury, stroke, heart attack, prolonged ventilation, wound complication, need for re-operation, and death, and the patient agrees to proceed. He is getting teeth pulled on 3/1 so he should have dental.  Last dose of Humira was 2/13.

## 2022-02-22 ENCOUNTER — INITIAL CONSULT (OUTPATIENT)
Dept: CARDIOTHORACIC SURGERY | Age: 67
End: 2022-02-22
Payer: COMMERCIAL

## 2022-02-22 ENCOUNTER — PREP FOR PROCEDURE (OUTPATIENT)
Dept: CARDIOTHORACIC SURGERY | Age: 67
End: 2022-02-22

## 2022-02-22 VITALS
WEIGHT: 165 LBS | DIASTOLIC BLOOD PRESSURE: 93 MMHG | BODY MASS INDEX: 23.62 KG/M2 | HEIGHT: 70 IN | SYSTOLIC BLOOD PRESSURE: 143 MMHG | HEART RATE: 70 BPM

## 2022-02-22 DIAGNOSIS — Z01.811 PRE-OP CHEST EXAM: Primary | ICD-10-CM

## 2022-02-22 DIAGNOSIS — Z01.818 PRE-OP EVALUATION: ICD-10-CM

## 2022-02-22 DIAGNOSIS — I35.0 NONRHEUMATIC AORTIC VALVE STENOSIS: Primary | ICD-10-CM

## 2022-02-22 DIAGNOSIS — I25.10 CAD IN NATIVE ARTERY: ICD-10-CM

## 2022-02-22 PROCEDURE — 99214 OFFICE O/P EST MOD 30 MIN: CPT | Performed by: THORACIC SURGERY (CARDIOTHORACIC VASCULAR SURGERY)

## 2022-02-22 RX ORDER — SODIUM CHLORIDE 9 MG/ML
INJECTION, SOLUTION INTRAVENOUS CONTINUOUS
Status: CANCELLED | OUTPATIENT
Start: 2022-02-22

## 2022-02-22 RX ORDER — SODIUM CHLORIDE 0.9 % (FLUSH) 0.9 %
10 SYRINGE (ML) INJECTION EVERY 12 HOURS SCHEDULED
Status: CANCELLED | OUTPATIENT
Start: 2022-02-22

## 2022-02-22 RX ORDER — CHLORHEXIDINE GLUCONATE 4 G/100ML
SOLUTION TOPICAL ONCE
Status: CANCELLED | OUTPATIENT
Start: 2022-02-22 | End: 2022-02-22

## 2022-02-22 RX ORDER — SODIUM CHLORIDE 0.9 % (FLUSH) 0.9 %
10 SYRINGE (ML) INJECTION PRN
Status: CANCELLED | OUTPATIENT
Start: 2022-02-22

## 2022-02-22 RX ORDER — SODIUM CHLORIDE 9 MG/ML
25 INJECTION, SOLUTION INTRAVENOUS PRN
Status: CANCELLED | OUTPATIENT
Start: 2022-02-22

## 2022-02-22 RX ORDER — CHLORHEXIDINE GLUCONATE 0.12 MG/ML
15 RINSE ORAL ONCE
Status: CANCELLED | OUTPATIENT
Start: 2022-02-22 | End: 2022-02-22

## 2022-02-22 NOTE — PROGRESS NOTES
Patient agreed to COVID test on 3/3 at the  [facility] 8 am- 4:45 pm located at  [address] [place] [address]. Patient instructed to bring ID. Patient instructed to self isolate until day of surgery.

## 2022-02-22 NOTE — H&P
HPI.   Lungs: Denies shortness of breath, cough, or wheezing. Gastrointestinal: Denies nausea, vomiting, constipation, or diarrhea. Genitourinary: dysuria or hematuria. Psychiatric: Patient denies anxiety or depression. Neurologic: Patient denies weakness of the extremities, dizziness, or headaches. All other ROS checked and found to be negative. Objective: There were no vitals filed for this visit. General Appearance: Appears stated age. Communicates well, no acute distress. HEENT: Head is normocephalic, atraumatic. EOMs intact, PERRL. Trachea midline. Lungs: Normal respiratory rate and normal effort. Not in respiratory distress. Breath sounds clear to auscultation. No wheezes. Heart: normal rate and regular rhythm, normal heart sounds positive murmur  Chest: Symmetric chest wall expansion. Extremities: Normal range of motion. Neurological: Patient is alert and oriented to person, place and time. Patient has normal reflexes. Skin: Warm and dry. Abdomen: Abdomen is soft and non-distended. Bowel sounds are normal. There is no abdominal tenderness tenderness. There is no guarding. There is no mass. Pulses: Distal pulses are intact. Skin: Warm and dry without lesions. ASSESSMENT:  Patient Active Problem List   Diagnosis    Severe aortic stenosis    Psoriasis    Bicuspid aortic valve    Immunosuppression due to drug therapy ([facility] [address]    CAD in native artery       AS, CAD       PLAN:  AVR with CABG to LAD, RCA, maybe diag, maybe OM. PAT on 3/3 with OR 3/7 at 7am.  All risks, benefits, alternatives and potential complications explained thoroughly including, but not limited to, bleeding, infection, lung injury, kidney injury, stroke, heart attack, prolonged ventilation, wound complication, need for re-operation, and death, and the patient agrees to proceed.    teeth pulled on 3/1 see chart for dental   Last dose of Humira was 2/13

## 2022-02-25 RX ORDER — ASPIRIN 81 MG/1
TABLET ORAL
Qty: 90 TABLET | Refills: 3 | Status: SHIPPED
Start: 2022-02-25 | End: 2022-04-11 | Stop reason: CLARIF

## 2022-03-01 RX ORDER — AMOXICILLIN 500 MG/1
500 CAPSULE ORAL EVERY 8 HOURS
Status: ON HOLD | COMMUNITY
End: 2022-03-11 | Stop reason: HOSPADM

## 2022-03-01 RX ORDER — HYDROCODONE BITARTRATE AND ACETAMINOPHEN 5; 325 MG/1; MG/1
1 TABLET ORAL EVERY 4 HOURS PRN
Status: ON HOLD | COMMUNITY
End: 2022-03-11 | Stop reason: HOSPADM

## 2022-03-01 NOTE — PROGRESS NOTES
[address]   PRE-ADMISSION TESTING GENERAL INSTRUCTIONS  [facility] Phone Number: [phone]      GENERAL INSTRUCTIONS:      [x] Hibiclens shower the night before and the morning of surgery. Do not use Hibiclens on your face or head. [x] No lotions, powders, deodorant. [x] Nothing by mouth after midnight, including gum, candy, mints, or water. [x] You may brush your teeth, gargle, but do NOT swallow water. [x] No tobacco products, illegal drugs, or alcohol within 24 hours of your surgery. [x] Jewelry or valuables should not be brought to the hospital. All body and/or tongue piercing's must be removed prior to arriving to hospital. ALL hair pins must be removed. [x] Bring insurance card and photo ID. [x] Bring copy of living will or healthcare power of  papers to be placed in your electronic record. [x] Transfusion Bracelet: Please bring with you to hospital, day of surgery       PARKING INSTRUCTIONS:     [x] ARRIVAL TIME: 0500 on 3/7/2022  · [x] Enter into the [employer]. Two people may accompany you. Masks are required. · [x] Parking Lot \"I\" is where you will park. It is located on the corner of [facility] and [facility]. The entrance is on [facility]. · To enter, press the button and the gate will lift. A free token will be provided to exit the lot. EDUCATION INSTRUCTIONS:             [x] [address] placed in chart. [x] Pre-admission Testing educational folder given  [x] Incentive Spirometry,coughing & deep breathing exercises reviewed. [x] Fluoroscopy-Xray used in surgery reviewed with patient. Educational pamphlet placed in chart. [x] Pain: Post-op pain is normal and to be expected. You will be asked to rate your pain from 0-10. [x] Ask your nurse for your pain medication.       MEDICATION INSTRUCTIONS:    [x] Bring a complete list of your medications, please write the last time you took the medicine, give this list to the nurse. [x] Take the following medications the morning of surgery with 1-2 ounces of water: aspirin  [x] Stop herbal supplements and vitamins 5 days before your surgery. [x] Follow physician instructions regarding any blood thinners you may be taking. WHAT TO EXPECT:    [x] The day of surgery you will be greeted and checked in by the [employer].  In addition, you will be registered in the [employer] by a Patient Access Representative. Please bring your photo ID and insurance card. A nurse will greet you in accordance to the time you are needed in the pre-op area to prepare you for surgery. Please do not be discouraged if you are not greeted in the order you arrive as there are many variables that are involved in patient preparation. Your patience is greatly appreciated as you wait for your nurse. Please bring in items such as: books, magazines, newspapers, electronics, or any other items  to occupy your time in the waiting area. [x]  Delays may occur with surgery and staff will make a sincere effort to keep you informed of delays. If any delays occur with your procedure, we apologize ahead of time for your inconvenience as we recognize the value of your time.

## 2022-03-03 ENCOUNTER — TELEPHONE (OUTPATIENT)
Dept: CARDIOTHORACIC SURGERY | Age: 67
End: 2022-03-03

## 2022-03-03 ENCOUNTER — HOSPITAL ENCOUNTER (OUTPATIENT)
Dept: GENERAL RADIOLOGY | Age: 67
Discharge: HOME | DRG: 219 | End: 2022-03-05
Payer: COMMERCIAL

## 2022-03-03 ENCOUNTER — HOSPITAL ENCOUNTER (OUTPATIENT)
Dept: PREADMISSION TESTING | Age: 67
Discharge: HOME | DRG: 219 | End: 2022-03-03
Payer: COMMERCIAL

## 2022-03-03 ENCOUNTER — HOSPITAL ENCOUNTER (OUTPATIENT)
Dept: CT IMAGING | Age: 67
Discharge: HOME | End: 2022-03-05
Payer: COMMERCIAL

## 2022-03-03 ENCOUNTER — HOSPITAL ENCOUNTER (OUTPATIENT)
Age: 67
Discharge: HOME | End: 2022-03-05
Payer: COMMERCIAL

## 2022-03-03 ENCOUNTER — HOSPITAL ENCOUNTER (OUTPATIENT)
Dept: INTERVENTIONAL RADIOLOGY/VASCULAR | Age: 67
Discharge: HOME | End: 2022-03-05
Payer: COMMERCIAL

## 2022-03-03 ENCOUNTER — HOSPITAL ENCOUNTER (OUTPATIENT)
Dept: ULTRASOUND IMAGING | Age: 67
Discharge: HOME | End: 2022-03-05
Payer: COMMERCIAL

## 2022-03-03 VITALS
SYSTOLIC BLOOD PRESSURE: 134 MMHG | RESPIRATION RATE: 16 BRPM | DIASTOLIC BLOOD PRESSURE: 69 MMHG | BODY MASS INDEX: 24.42 KG/M2 | OXYGEN SATURATION: 97 % | HEIGHT: 70 IN | TEMPERATURE: 98.3 F | HEART RATE: 63 BPM | WEIGHT: 170.6 LBS

## 2022-03-03 DIAGNOSIS — Z01.818 PRE-OP EVALUATION: ICD-10-CM

## 2022-03-03 DIAGNOSIS — U07.1 COVID-19: ICD-10-CM

## 2022-03-03 DIAGNOSIS — I25.10 CAD IN NATIVE ARTERY: ICD-10-CM

## 2022-03-03 DIAGNOSIS — Z01.811 PRE-OP CHEST EXAM: ICD-10-CM

## 2022-03-03 LAB
ABO/RH: NORMAL
ALBUMIN SERPL-MCNC: 4.6 G/DL (ref 3.5–5.2)
ALP BLD-CCNC: 51 U/L (ref 40–129)
ALT SERPL-CCNC: 11 U/L (ref 0–40)
ANION GAP SERPL CALCULATED.3IONS-SCNC: 8 MMOL/L (ref 7–16)
ANTIBODY SCREEN: NORMAL
APTT: 32.7 SEC (ref 24.5–35.1)
AST SERPL-CCNC: 15 U/L (ref 0–39)
BILIRUB SERPL-MCNC: 0.7 MG/DL (ref 0–1.2)
BILIRUBIN URINE: NEGATIVE
BLOOD, URINE: NEGATIVE
BUN BLDV-MCNC: 9 MG/DL (ref 6–23)
CALCIUM SERPL-MCNC: 9.6 MG/DL (ref 8.6–10.2)
CHLORIDE BLD-SCNC: 100 MMOL/L (ref 98–107)
CLARITY: CLEAR
CO2: 31 MMOL/L (ref 22–29)
COLOR: YELLOW
CREAT SERPL-MCNC: 1 MG/DL (ref 0.7–1.2)
EKG ATRIAL RATE: 66 BPM
EKG P AXIS: 60 DEGREES
EKG P-R INTERVAL: 168 MS
EKG Q-T INTERVAL: 388 MS
EKG QRS DURATION: 92 MS
EKG QTC CALCULATION (BAZETT): 406 MS
EKG R AXIS: 29 DEGREES
EKG T AXIS: 59 DEGREES
EKG VENTRICULAR RATE: 66 BPM
GFR AFRICAN AMERICAN: >60
GFR NON-AFRICAN AMERICAN: >60 ML/MIN/1.73
GLUCOSE BLD-MCNC: 97 MG/DL (ref 74–99)
GLUCOSE URINE: NEGATIVE MG/DL
HBA1C MFR BLD: 4.9 % (ref 4–5.6)
HCT VFR BLD CALC: 44.3 % (ref 37–54)
HEMOGLOBIN: 14.9 G/DL (ref 12.5–16.5)
INR BLD: 1
KETONES, URINE: NEGATIVE MG/DL
LEUKOCYTE ESTERASE, URINE: NEGATIVE
MCH RBC QN AUTO: 31.4 PG (ref 26–35)
MCHC RBC AUTO-ENTMCNC: 33.6 % (ref 32–34.5)
MCV RBC AUTO: 93.3 FL (ref 80–99.9)
NITRITE, URINE: NEGATIVE
PDW BLD-RTO: 13 FL (ref 11.5–15)
PH UA: 5.5 (ref 5–9)
PLATELET # BLD: 159 E9/L (ref 130–450)
PMV BLD AUTO: 10.9 FL (ref 7–12)
POTASSIUM SERPL-SCNC: 4.3 MMOL/L (ref 3.5–5)
PROTEIN UA: NEGATIVE MG/DL
PROTHROMBIN TIME: 11.3 SEC (ref 9.3–12.4)
RBC # BLD: 4.75 E12/L (ref 3.8–5.8)
SODIUM BLD-SCNC: 139 MMOL/L (ref 132–146)
SPECIFIC GRAVITY UA: 1.02 (ref 1–1.03)
TOTAL PROTEIN: 8.3 G/DL (ref 6.4–8.3)
UROBILINOGEN, URINE: 0.2 E.U./DL
WBC # BLD: 5 E9/L (ref 4.5–11.5)

## 2022-03-03 PROCEDURE — 83036 HEMOGLOBIN GLYCOSYLATED A1C: CPT

## 2022-03-03 PROCEDURE — 81003 URINALYSIS AUTO W/O SCOPE: CPT

## 2022-03-03 PROCEDURE — 85027 COMPLETE CBC AUTOMATED: CPT

## 2022-03-03 PROCEDURE — 36415 COLL VENOUS BLD VENIPUNCTURE: CPT

## 2022-03-03 PROCEDURE — 86923 COMPATIBILITY TEST ELECTRIC: CPT

## 2022-03-03 PROCEDURE — 93005 ELECTROCARDIOGRAM TRACING: CPT | Performed by: THORACIC SURGERY (CARDIOTHORACIC VASCULAR SURGERY)

## 2022-03-03 PROCEDURE — 85610 PROTHROMBIN TIME: CPT

## 2022-03-03 PROCEDURE — 93970 EXTREMITY STUDY: CPT | Performed by: RADIOLOGY

## 2022-03-03 PROCEDURE — 80053 COMPREHEN METABOLIC PANEL: CPT

## 2022-03-03 PROCEDURE — 87088 URINE BACTERIA CULTURE: CPT

## 2022-03-03 PROCEDURE — 93922 UPR/L XTREMITY ART 2 LEVELS: CPT

## 2022-03-03 PROCEDURE — 71046 X-RAY EXAM CHEST 2 VIEWS: CPT

## 2022-03-03 PROCEDURE — 85730 THROMBOPLASTIN TIME PARTIAL: CPT

## 2022-03-03 PROCEDURE — 86900 BLOOD TYPING SEROLOGIC ABO: CPT

## 2022-03-03 PROCEDURE — 86901 BLOOD TYPING SEROLOGIC RH(D): CPT

## 2022-03-03 PROCEDURE — U0003 INFECTIOUS AGENT DETECTION BY NUCLEIC ACID (DNA OR RNA); SEVERE ACUTE RESPIRATORY SYNDROME CORONAVIRUS 2 (SARS-COV-2) (CORONAVIRUS DISEASE [COVID-19]), AMPLIFIED PROBE TECHNIQUE, MAKING USE OF HIGH THROUGHPUT TECHNOLOGIES AS DESCRIBED BY CMS-2020-01-R: HCPCS

## 2022-03-03 PROCEDURE — 71250 CT THORAX DX C-: CPT

## 2022-03-03 PROCEDURE — 87081 CULTURE SCREEN ONLY: CPT

## 2022-03-03 PROCEDURE — 86850 RBC ANTIBODY SCREEN: CPT

## 2022-03-03 PROCEDURE — U0005 INFEC AGEN DETEC AMPLI PROBE: HCPCS

## 2022-03-03 PROCEDURE — 93970 EXTREMITY STUDY: CPT

## 2022-03-04 ENCOUNTER — ANESTHESIA EVENT (OUTPATIENT)
Dept: OPERATING ROOM | Age: 67
DRG: 219 | End: 2022-03-04
Payer: COMMERCIAL

## 2022-03-04 LAB
MRSA CULTURE ONLY: NORMAL
SARS-COV-2, PCR: NOT DETECTED

## 2022-03-04 NOTE — PROGRESS NOTES
Pre-operative testing review:   --carotids with no significant stenosis  --farida with good flow bilaterally  --ct chest reviewed  --TTE  EF 60%, severe AS  --pft with FEV1 81percent of predicted and DLCO 138  percent of predicted  --hgA1c 4.9  -- Dental clearance obtained on 3/1   --H&P on chart from date 2/22 to be updated DOS    Recent Labs     03/03/22  0755   HGB 14.9   HCT 44.3        Recent Labs     03/03/22  0755   BUN 9   CREATININE 1.0     Lab Results   Component Value Date/Time    LABURIN Growth not present, incubation continues 03/03/2022 08:20 AM         [id number] Score for Atrial Fibrillation Stroke Risk   Risk   Factors  Component Value   C CHF  0   H HTN  0   A2 Age >= 75  0   D DM  0   S2 Prior Stroke/TIA  0   V Vascular Disease  0   A Age 74-69  0   Sc Sex  0    [id number]  Score  0       Risk Score calculation is dependent on accuracy of patient problem list and past encounter diagnosis.

## 2022-03-05 LAB — URINE CULTURE, ROUTINE: NORMAL

## 2022-03-07 ENCOUNTER — HOSPITAL ENCOUNTER (INPATIENT)
Age: 67
LOS: 4 days | Discharge: NURSING, REHAB OR LONG-TERM CARE | DRG: 219 | End: 2022-03-11
Attending: THORACIC SURGERY (CARDIOTHORACIC VASCULAR SURGERY) | Admitting: THORACIC SURGERY (CARDIOTHORACIC VASCULAR SURGERY)
Payer: COMMERCIAL

## 2022-03-07 ENCOUNTER — ANESTHESIA (OUTPATIENT)
Dept: OPERATING ROOM | Age: 67
DRG: 219 | End: 2022-03-07
Payer: COMMERCIAL

## 2022-03-07 ENCOUNTER — APPOINTMENT (OUTPATIENT)
Dept: GENERAL RADIOLOGY | Age: 67
DRG: 219 | End: 2022-03-07
Attending: THORACIC SURGERY (CARDIOTHORACIC VASCULAR SURGERY)
Payer: COMMERCIAL

## 2022-03-07 VITALS — OXYGEN SATURATION: 94 % | TEMPERATURE: 94.8 F

## 2022-03-07 DIAGNOSIS — G89.18 POST-OP PAIN: ICD-10-CM

## 2022-03-07 DIAGNOSIS — Z95.2 S/P AVR: ICD-10-CM

## 2022-03-07 DIAGNOSIS — U07.1 COVID-19: Primary | ICD-10-CM

## 2022-03-07 DIAGNOSIS — Z95.1 S/P CABG (CORONARY ARTERY BYPASS GRAFT): ICD-10-CM

## 2022-03-07 LAB
AADO2: 128.8 MMHG
AADO2: 164.5 MMHG
ACTIVATED CLOTTING TIME: 108 SECONDS (ref 99–130)
ACTIVATED CLOTTING TIME: 113 SECONDS (ref 99–130)
ACTIVATED CLOTTING TIME: 400 SECONDS (ref 99–130)
ACTIVATED CLOTTING TIME: 442 SECONDS (ref 99–130)
ACTIVATED CLOTTING TIME: 511 SECONDS (ref 99–130)
ACTIVATED CLOTTING TIME: 607 SECONDS (ref 99–130)
ANION GAP SERPL CALCULATED.3IONS-SCNC: 9 MMOL/L (ref 7–16)
ANION GAP: 10 MMOL/L (ref 7–16)
ANION GAP: 12 MMOL/L (ref 7–16)
ANION GAP: 9 MMOL/L (ref 7–16)
APTT: 33.4 SEC (ref 24.5–35.1)
B.E.: -1.3 MMOL/L (ref -3–3)
B.E.: -2.4 MMOL/L (ref -3–3)
B.E.: -3.7 MMOL/L (ref -3–3)
B.E.: -8.7 MMOL/L (ref -3–3)
B.E.: 0.1 MMOL/L (ref -3–3)
B.E.: 0.5 MMOL/L (ref -3–3)
BLOOD BANK DISPENSE STATUS: NORMAL
BLOOD BANK PRODUCT CODE: NORMAL
BPU ID: NORMAL
BUN BLDV-MCNC: 6 MG/DL (ref 6–23)
CALCIUM IONIZED: 1.23 MMOL/L (ref 1.15–1.33)
CALCIUM SERPL-MCNC: 8.1 MG/DL (ref 8.6–10.2)
CARDIOPULMONARY BYPASS: NO
CARDIOPULMONARY BYPASS: YES
CARDIOPULMONARY BYPASS: YES
CHLORIDE BLD-SCNC: 106 MMOL/L (ref 98–107)
CO2: 24 MMOL/L (ref 22–29)
COHB: 0 % (ref 0–1.5)
COHB: 0.1 % (ref 0–1.5)
COHB: 0.3 % (ref 0–1.5)
CREAT SERPL-MCNC: 0.8 MG/DL (ref 0.7–1.2)
CRITICAL: ABNORMAL
DATE ANALYZED: ABNORMAL
DATE OF COLLECTION: ABNORMAL
DESCRIPTION BLOOD BANK: NORMAL
DEVICE: ABNORMAL
FIO2: 40 %
FIO2: 50 %
GFR AFRICAN AMERICAN: >60
GFR NON-AFRICAN AMERICAN: >60 ML/MIN/1.73
GFR, ESTIMATED: >60 ML/MIN/1.73
GLUCOSE BLD-MCNC: 105 MG/DL (ref 74–99)
GLUCOSE BLD-MCNC: 149 MG/DL (ref 74–99)
GLUCOSE BLD-MCNC: 172 MG/DL (ref 74–99)
GLUCOSE BLD-MCNC: 219 MG/DL (ref 74–99)
HCO3: 19 MMOL/L (ref 22–26)
HCO3: 21.6 MMOL/L (ref 22–26)
HCO3: 23.5 MMOL/L (ref 22–26)
HCO3: 23.9 MMOL/L (ref 22–26)
HCO3: 25 MMOL/L (ref 22–26)
HCO3: 26.6 MMOL/L (ref 22–26)
HCT VFR BLD CALC: 33.3 % (ref 37–54)
HEMATOCRIT: 23 % (ref 37–54)
HEMATOCRIT: 26 % (ref 37–54)
HEMATOCRIT: 28 % (ref 37–54)
HEMOGLOBIN: 11.3 G/DL (ref 12.5–16.5)
HEMOGLOBIN: 7.9 G/DL (ref 12.5–15.5)
HEMOGLOBIN: 8.8 G/DL (ref 12.5–15.5)
HEMOGLOBIN: 9.4 G/DL (ref 12.5–15.5)
HHB: 2 % (ref 0–5)
HHB: 2.4 % (ref 0–5)
HHB: 5.1 % (ref 0–5)
INR BLD: 1.5
LAB: ABNORMAL
MAGNESIUM: 2.4 MG/DL (ref 1.6–2.6)
MCH RBC QN AUTO: 30.8 PG (ref 26–35)
MCHC RBC AUTO-ENTMCNC: 33.9 % (ref 32–34.5)
MCV RBC AUTO: 90.7 FL (ref 80–99.9)
METER GLUCOSE: 104 MG/DL (ref 74–99)
METER GLUCOSE: 120 MG/DL (ref 74–99)
METER GLUCOSE: 136 MG/DL (ref 74–99)
METER GLUCOSE: 137 MG/DL (ref 74–99)
METER GLUCOSE: 64 MG/DL (ref 74–99)
METHB: 0.2 % (ref 0–1.5)
METHB: 0.4 % (ref 0–1.5)
METHB: 0.4 % (ref 0–1.5)
MODE: ABNORMAL
MODE: ABNORMAL
MODE: AC
O2 SATURATION: 100 % (ref 92–98.5)
O2 SATURATION: 100 % (ref 92–98.5)
O2 SATURATION: 94.9 % (ref 92–98.5)
O2 SATURATION: 97.6 % (ref 92–98.5)
O2 SATURATION: 98 % (ref 92–98.5)
O2 SATURATION: 99.9 % (ref 92–98.5)
O2HB: 94.4 % (ref 94–97)
O2HB: 97.2 % (ref 94–97)
O2HB: 97.5 % (ref 94–97)
OPERATOR ID: 1868
OPERATOR ID: 1868
OPERATOR ID: 359
OPERATOR ID: ABNORMAL
PATIENT TEMP: 37 C
PCO2 37: 41.1 MMHG (ref 35–45)
PCO2 37: 41.7 MMHG (ref 35–45)
PCO2 37: 49 MMHG (ref 35–45)
PCO2: 40.1 MMHG (ref 35–45)
PCO2: 45 MMHG (ref 35–45)
PCO2: 47.8 MMHG (ref 35–45)
PDW BLD-RTO: 12.7 FL (ref 11.5–15)
PEEP/CPAP: 5 CMH2O
PEEP/CPAP: 7 CMH2O
PFO2: 2.29 MMHG/%
PFO2: 2.58 MMHG/%
PH 37: 7.34 (ref 7.35–7.45)
PH 37: 7.37 (ref 7.35–7.45)
PH 37: 7.39 (ref 7.35–7.45)
PH BLOOD GAS: 7.22 (ref 7.35–7.45)
PH BLOOD GAS: 7.34 (ref 7.35–7.45)
PH BLOOD GAS: 7.35 (ref 7.35–7.45)
PLATELET # BLD: 103 E9/L (ref 130–450)
PMV BLD AUTO: 10.3 FL (ref 7–12)
PO2 37: 262.8 MMHG (ref 60–80)
PO2 37: 402 MMHG (ref 60–80)
PO2 37: 435.9 MMHG (ref 60–80)
PO2: 128.9 MMHG (ref 75–100)
PO2: 134.2 MMHG (ref 75–100)
PO2: 91.4 MMHG (ref 75–100)
POC BUN: 7 MG/DL (ref 8–23)
POC CHLORIDE: 102 MMOL/L (ref 100–108)
POC CHLORIDE: 98 MMOL/L (ref 100–108)
POC CHLORIDE: 99 MMOL/L (ref 100–108)
POC CO2: 24.6 MMOL/L (ref 22–29)
POC CO2: 25.6 MMOL/L (ref 22–29)
POC CO2: 27.3 MMOL/L (ref 22–29)
POC CREATININE: 0.9 MG/DL (ref 0.7–1.2)
POC CREATININE: 1 MG/DL (ref 0.7–1.2)
POC CREATININE: 1.1 MG/DL (ref 0.7–1.2)
POC IONIZED CALCIUM: 1 (ref 1.1–1.3)
POC IONIZED CALCIUM: 1 (ref 1.1–1.3)
POC IONIZED CALCIUM: 1.1 (ref 1.1–1.3)
POC LACTIC ACID: 1.6 (ref 0.5–2.2)
POC LACTIC ACID: 1.7 (ref 0.5–2.2)
POC LACTIC ACID: 2.8 (ref 0.5–2.2)
POC SODIUM: 134 MMOL/L (ref 132–146)
POC SODIUM: 135 MMOL/L (ref 132–146)
POC SODIUM: 139 MMOL/L (ref 132–146)
POC SOURCE: ABNORMAL
POTASSIUM SERPL-SCNC: 3.79 MMOL/L (ref 3.5–5)
POTASSIUM SERPL-SCNC: 4.2 MMOL/L (ref 3.5–5)
POTASSIUM SERPL-SCNC: 4.2 MMOL/L (ref 3.5–5.5)
POTASSIUM SERPL-SCNC: 4.4 MMOL/L (ref 3.5–5)
POTASSIUM SERPL-SCNC: 4.6 MMOL/L (ref 3.5–5)
POTASSIUM SERPL-SCNC: 4.7 MMOL/L (ref 3.5–5.5)
POTASSIUM SERPL-SCNC: 5 MMOL/L (ref 3.5–5.5)
PROTHROMBIN TIME: 16.6 SEC (ref 9.3–12.4)
PS: 8 CMH20
RBC # BLD: 3.67 E12/L (ref 3.8–5.8)
RI(T): 1.28
RI(T): 1.41
RR MECHANICAL: 14 B/MIN
SODIUM BLD-SCNC: 139 MMOL/L (ref 132–146)
SOURCE, BLOOD GAS: ABNORMAL
THB: 11.5 G/DL (ref 11.5–16.5)
THB: 12.8 G/DL (ref 11.5–16.5)
THB: 13.4 G/DL (ref 11.5–16.5)
TIME ANALYZED: 1150
TIME ANALYZED: 1305
TIME ANALYZED: 1658
VT MECHANICAL: 500 ML
WBC # BLD: 9.4 E9/L (ref 4.5–11.5)

## 2022-03-07 PROCEDURE — APPSS60 APP SPLIT SHARED TIME 46-60 MINUTES: Performed by: PHYSICIAN ASSISTANT

## 2022-03-07 PROCEDURE — C1729 CATH, DRAINAGE: HCPCS | Performed by: THORACIC SURGERY (CARDIOTHORACIC VASCULAR SURGERY)

## 2022-03-07 PROCEDURE — 2580000003 HC RX 258: Performed by: ANESTHESIOLOGY

## 2022-03-07 PROCEDURE — 2500000003 HC RX 250 WO HCPCS: Performed by: ANESTHESIOLOGY

## 2022-03-07 PROCEDURE — 6360000002 HC RX W HCPCS: Performed by: NURSE PRACTITIONER

## 2022-03-07 PROCEDURE — 82962 GLUCOSE BLOOD TEST: CPT

## 2022-03-07 PROCEDURE — 94664 DEMO&/EVAL PT USE INHALER: CPT

## 2022-03-07 PROCEDURE — 6360000002 HC RX W HCPCS: Performed by: ANESTHESIOLOGY

## 2022-03-07 PROCEDURE — 3600000018 HC SURGERY OHS ADDTL 15MIN: Performed by: THORACIC SURGERY (CARDIOTHORACIC VASCULAR SURGERY)

## 2022-03-07 PROCEDURE — 3700000000 HC ANESTHESIA ATTENDED CARE: Performed by: THORACIC SURGERY (CARDIOTHORACIC VASCULAR SURGERY)

## 2022-03-07 PROCEDURE — 2580000003 HC RX 258: Performed by: THORACIC SURGERY (CARDIOTHORACIC VASCULAR SURGERY)

## 2022-03-07 PROCEDURE — 94002 VENT MGMT INPAT INIT DAY: CPT

## 2022-03-07 PROCEDURE — 85730 THROMBOPLASTIN TIME PARTIAL: CPT

## 2022-03-07 PROCEDURE — 05HM33Z INSERTION OF INFUSION DEVICE INTO RIGHT INTERNAL JUGULAR VEIN, PERCUTANEOUS APPROACH: ICD-10-PCS | Performed by: THORACIC SURGERY (CARDIOTHORACIC VASCULAR SURGERY)

## 2022-03-07 PROCEDURE — 6360000002 HC RX W HCPCS: Performed by: THORACIC SURGERY (CARDIOTHORACIC VASCULAR SURGERY)

## 2022-03-07 PROCEDURE — 82803 BLOOD GASES ANY COMBINATION: CPT

## 2022-03-07 PROCEDURE — 3600000008 HC SURGERY OHS BASE: Performed by: THORACIC SURGERY (CARDIOTHORACIC VASCULAR SURGERY)

## 2022-03-07 PROCEDURE — P9041 ALBUMIN (HUMAN),5%, 50ML: HCPCS | Performed by: NURSE ANESTHETIST, CERTIFIED REGISTERED

## 2022-03-07 PROCEDURE — 2580000003 HC RX 258: Performed by: NURSE PRACTITIONER

## 2022-03-07 PROCEDURE — 2000000000 HC ICU R&B

## 2022-03-07 PROCEDURE — 7100000001 HC PACU RECOVERY - ADDTL 15 MIN

## 2022-03-07 PROCEDURE — 7100000000 HC PACU RECOVERY - FIRST 15 MIN

## 2022-03-07 PROCEDURE — 88311 DECALCIFY TISSUE: CPT

## 2022-03-07 PROCEDURE — C9113 INJ PANTOPRAZOLE SODIUM, VIA: HCPCS | Performed by: NURSE PRACTITIONER

## 2022-03-07 PROCEDURE — 85610 PROTHROMBIN TIME: CPT

## 2022-03-07 PROCEDURE — 2780000006 HC MISC HEART VALVE: Performed by: THORACIC SURGERY (CARDIOTHORACIC VASCULAR SURGERY)

## 2022-03-07 PROCEDURE — 2500000003 HC RX 250 WO HCPCS: Performed by: NURSE PRACTITIONER

## 2022-03-07 PROCEDURE — 02L70CK OCCLUSION OF LEFT ATRIAL APPENDAGE WITH EXTRALUMINAL DEVICE, OPEN APPROACH: ICD-10-PCS | Performed by: THORACIC SURGERY (CARDIOTHORACIC VASCULAR SURGERY)

## 2022-03-07 PROCEDURE — 33518 CABG ARTERY-VEIN TWO: CPT | Performed by: THORACIC SURGERY (CARDIOTHORACIC VASCULAR SURGERY)

## 2022-03-07 PROCEDURE — 99231 SBSQ HOSP IP/OBS SF/LOW 25: CPT | Performed by: INTERNAL MEDICINE

## 2022-03-07 PROCEDURE — 021109W BYPASS CORONARY ARTERY, TWO ARTERIES FROM AORTA WITH AUTOLOGOUS VENOUS TISSUE, OPEN APPROACH: ICD-10-PCS | Performed by: THORACIC SURGERY (CARDIOTHORACIC VASCULAR SURGERY)

## 2022-03-07 PROCEDURE — 06BQ4ZZ EXCISION OF LEFT SAPHENOUS VEIN, PERCUTANEOUS ENDOSCOPIC APPROACH: ICD-10-PCS | Performed by: THORACIC SURGERY (CARDIOTHORACIC VASCULAR SURGERY)

## 2022-03-07 PROCEDURE — 6370000000 HC RX 637 (ALT 250 FOR IP): Performed by: NURSE PRACTITIONER

## 2022-03-07 PROCEDURE — 6370000000 HC RX 637 (ALT 250 FOR IP): Performed by: NURSE ANESTHETIST, CERTIFIED REGISTERED

## 2022-03-07 PROCEDURE — 94640 AIRWAY INHALATION TREATMENT: CPT

## 2022-03-07 PROCEDURE — 33405 REPLACEMENT AORTIC VALVE OPN: CPT | Performed by: THORACIC SURGERY (CARDIOTHORACIC VASCULAR SURGERY)

## 2022-03-07 PROCEDURE — 85027 COMPLETE CBC AUTOMATED: CPT

## 2022-03-07 PROCEDURE — 33533 CABG ARTERIAL SINGLE: CPT | Performed by: THORACIC SURGERY (CARDIOTHORACIC VASCULAR SURGERY)

## 2022-03-07 PROCEDURE — A4217 STERILE WATER/SALINE, 500 ML: HCPCS | Performed by: THORACIC SURGERY (CARDIOTHORACIC VASCULAR SURGERY)

## 2022-03-07 PROCEDURE — P9045 ALBUMIN (HUMAN), 5%, 250 ML: HCPCS | Performed by: NURSE PRACTITIONER

## 2022-03-07 PROCEDURE — 3700000001 HC ADD 15 MINUTES (ANESTHESIA): Performed by: THORACIC SURGERY (CARDIOTHORACIC VASCULAR SURGERY)

## 2022-03-07 PROCEDURE — 33425 REPAIR OF MITRAL VALVE: CPT | Performed by: THORACIC SURGERY (CARDIOTHORACIC VASCULAR SURGERY)

## 2022-03-07 PROCEDURE — 85347 COAGULATION TIME ACTIVATED: CPT

## 2022-03-07 PROCEDURE — 2500000003 HC RX 250 WO HCPCS: Performed by: NURSE ANESTHETIST, CERTIFIED REGISTERED

## 2022-03-07 PROCEDURE — B24BZZ4 ULTRASONOGRAPHY OF HEART WITH AORTA, TRANSESOPHAGEAL: ICD-10-PCS | Performed by: THORACIC SURGERY (CARDIOTHORACIC VASCULAR SURGERY)

## 2022-03-07 PROCEDURE — 88305 TISSUE EXAM BY PATHOLOGIST: CPT

## 2022-03-07 PROCEDURE — C1713 ANCHOR/SCREW BN/BN,TIS/BN: HCPCS | Performed by: THORACIC SURGERY (CARDIOTHORACIC VASCULAR SURGERY)

## 2022-03-07 PROCEDURE — 6370000000 HC RX 637 (ALT 250 FOR IP): Performed by: ANESTHESIOLOGY

## 2022-03-07 PROCEDURE — 80048 BASIC METABOLIC PNL TOTAL CA: CPT

## 2022-03-07 PROCEDURE — 02QG0ZZ REPAIR MITRAL VALVE, OPEN APPROACH: ICD-10-PCS | Performed by: THORACIC SURGERY (CARDIOTHORACIC VASCULAR SURGERY)

## 2022-03-07 PROCEDURE — 99232 SBSQ HOSP IP/OBS MODERATE 35: CPT | Performed by: NURSE PRACTITIONER

## 2022-03-07 PROCEDURE — 33508 ENDOSCOPIC VEIN HARVEST: CPT | Performed by: THORACIC SURGERY (CARDIOTHORACIC VASCULAR SURGERY)

## 2022-03-07 PROCEDURE — 2709999900 HC NON-CHARGEABLE SUPPLY: Performed by: THORACIC SURGERY (CARDIOTHORACIC VASCULAR SURGERY)

## 2022-03-07 PROCEDURE — 2700000000 HC OXYGEN THERAPY PER DAY

## 2022-03-07 PROCEDURE — 02RF08Z REPLACEMENT OF AORTIC VALVE WITH ZOOPLASTIC TISSUE, OPEN APPROACH: ICD-10-PCS | Performed by: THORACIC SURGERY (CARDIOTHORACIC VASCULAR SURGERY)

## 2022-03-07 PROCEDURE — 82805 BLOOD GASES W/O2 SATURATION: CPT

## 2022-03-07 PROCEDURE — 5A1221Z PERFORMANCE OF CARDIAC OUTPUT, CONTINUOUS: ICD-10-PCS | Performed by: THORACIC SURGERY (CARDIOTHORACIC VASCULAR SURGERY)

## 2022-03-07 PROCEDURE — A4648 IMPLANTABLE TISSUE MARKER: HCPCS | Performed by: THORACIC SURGERY (CARDIOTHORACIC VASCULAR SURGERY)

## 2022-03-07 PROCEDURE — 02100Z9 BYPASS CORONARY ARTERY, ONE ARTERY FROM LEFT INTERNAL MAMMARY, OPEN APPROACH: ICD-10-PCS | Performed by: THORACIC SURGERY (CARDIOTHORACIC VASCULAR SURGERY)

## 2022-03-07 PROCEDURE — 84132 ASSAY OF SERUM POTASSIUM: CPT

## 2022-03-07 PROCEDURE — 36415 COLL VENOUS BLD VENIPUNCTURE: CPT

## 2022-03-07 PROCEDURE — 2580000003 HC RX 258: Performed by: NURSE ANESTHETIST, CERTIFIED REGISTERED

## 2022-03-07 PROCEDURE — 6370000000 HC RX 637 (ALT 250 FOR IP): Performed by: THORACIC SURGERY (CARDIOTHORACIC VASCULAR SURGERY)

## 2022-03-07 PROCEDURE — A4216 STERILE WATER/SALINE, 10 ML: HCPCS | Performed by: NURSE PRACTITIONER

## 2022-03-07 PROCEDURE — 82330 ASSAY OF CALCIUM: CPT

## 2022-03-07 PROCEDURE — 6360000002 HC RX W HCPCS: Performed by: NURSE ANESTHETIST, CERTIFIED REGISTERED

## 2022-03-07 PROCEDURE — 2720000010 HC SURG SUPPLY STERILE: Performed by: THORACIC SURGERY (CARDIOTHORACIC VASCULAR SURGERY)

## 2022-03-07 PROCEDURE — 83735 ASSAY OF MAGNESIUM: CPT

## 2022-03-07 PROCEDURE — 71045 X-RAY EXAM CHEST 1 VIEW: CPT

## 2022-03-07 DEVICE — LOCKING SCREW,AXS,SELF-DRILLING
Type: IMPLANTABLE DEVICE | Site: STERNUM | Status: FUNCTIONAL
Brand: AXS, SMARTLOCK

## 2022-03-07 DEVICE — DEVICE OCCL CLP L35MM PLUNG GRP FLX SHFT FOR GILLINOV: Type: IMPLANTABLE DEVICE | Site: HEART | Status: FUNCTIONAL

## 2022-03-07 DEVICE — INSPIRIS RESILIA AORTIC VALVE
Type: IMPLANTABLE DEVICE | Site: HEART | Status: FUNCTIONAL
Brand: INSPIRIS RESILIA AORTIC VALVE

## 2022-03-07 DEVICE — STERNALPLATE, LADDER, NARROW: Type: IMPLANTABLE DEVICE | Site: STERNUM | Status: FUNCTIONAL

## 2022-03-07 DEVICE — STERNALPLATE, BOX: Type: IMPLANTABLE DEVICE | Site: STERNUM | Status: FUNCTIONAL

## 2022-03-07 RX ORDER — VASOPRESSIN 20 U/ML
INJECTION PARENTERAL PRN
Status: DISCONTINUED | OUTPATIENT
Start: 2022-03-07 | End: 2022-03-07 | Stop reason: SDUPTHER

## 2022-03-07 RX ORDER — NEOSTIGMINE METHYLSULFATE 1 MG/ML
INJECTION, SOLUTION INTRAVENOUS PRN
Status: DISCONTINUED | OUTPATIENT
Start: 2022-03-07 | End: 2022-03-07 | Stop reason: SDUPTHER

## 2022-03-07 RX ORDER — PANTOPRAZOLE SODIUM 40 MG/1
40 TABLET, DELAYED RELEASE ORAL DAILY
Status: DISCONTINUED | OUTPATIENT
Start: 2022-03-08 | End: 2022-03-11 | Stop reason: HOSPADM

## 2022-03-07 RX ORDER — SODIUM CHLORIDE 9 MG/ML
30 INJECTION, SOLUTION INTRAVENOUS CONTINUOUS
Status: DISCONTINUED | OUTPATIENT
Start: 2022-03-07 | End: 2022-03-08

## 2022-03-07 RX ORDER — MAGNESIUM SULFATE IN WATER 40 MG/ML
2000 INJECTION, SOLUTION INTRAVENOUS PRN
Status: DISCONTINUED | OUTPATIENT
Start: 2022-03-07 | End: 2022-03-08

## 2022-03-07 RX ORDER — SODIUM CHLORIDE 9 MG/ML
25 INJECTION, SOLUTION INTRAVENOUS PRN
Status: DISCONTINUED | OUTPATIENT
Start: 2022-03-07 | End: 2022-03-08

## 2022-03-07 RX ORDER — ROSUVASTATIN CALCIUM 20 MG/1
20 TABLET, COATED ORAL DAILY
Status: DISCONTINUED | OUTPATIENT
Start: 2022-03-08 | End: 2022-03-11 | Stop reason: HOSPADM

## 2022-03-07 RX ORDER — ALBUMIN, HUMAN INJ 5% 5 %
25 SOLUTION INTRAVENOUS PRN
Status: DISCONTINUED | OUTPATIENT
Start: 2022-03-07 | End: 2022-03-08

## 2022-03-07 RX ORDER — ONDANSETRON 2 MG/ML
4 INJECTION INTRAMUSCULAR; INTRAVENOUS EVERY 6 HOURS PRN
Status: DISCONTINUED | OUTPATIENT
Start: 2022-03-07 | End: 2022-03-11 | Stop reason: HOSPADM

## 2022-03-07 RX ORDER — 0.9 % SODIUM CHLORIDE 0.9 %
250 INTRAVENOUS SOLUTION INTRAVENOUS CONTINUOUS PRN
Status: DISCONTINUED | OUTPATIENT
Start: 2022-03-07 | End: 2022-03-08

## 2022-03-07 RX ORDER — PROPOFOL 10 MG/ML
INJECTION, EMULSION INTRAVENOUS
Status: DISPENSED
Start: 2022-03-07 | End: 2022-03-07

## 2022-03-07 RX ORDER — ONDANSETRON 4 MG/1
4 TABLET, ORALLY DISINTEGRATING ORAL EVERY 8 HOURS PRN
Status: DISCONTINUED | OUTPATIENT
Start: 2022-03-07 | End: 2022-03-11 | Stop reason: HOSPADM

## 2022-03-07 RX ORDER — NITROGLYCERIN 5 MG/ML
INJECTION, SOLUTION INTRAVENOUS PRN
Status: DISCONTINUED | OUTPATIENT
Start: 2022-03-07 | End: 2022-03-07 | Stop reason: SDUPTHER

## 2022-03-07 RX ORDER — CEFAZOLIN SODIUM 1 G/3ML
INJECTION, POWDER, FOR SOLUTION INTRAMUSCULAR; INTRAVENOUS PRN
Status: DISCONTINUED | OUTPATIENT
Start: 2022-03-07 | End: 2022-03-07 | Stop reason: SDUPTHER

## 2022-03-07 RX ORDER — CHLORHEXIDINE GLUCONATE 4 G/100ML
SOLUTION TOPICAL ONCE
Status: DISCONTINUED | OUTPATIENT
Start: 2022-03-07 | End: 2022-03-07 | Stop reason: HOSPADM

## 2022-03-07 RX ORDER — FENTANYL CITRATE 50 UG/ML
25 INJECTION, SOLUTION INTRAMUSCULAR; INTRAVENOUS ONCE
Status: COMPLETED | OUTPATIENT
Start: 2022-03-07 | End: 2022-03-07

## 2022-03-07 RX ORDER — IPRATROPIUM BROMIDE AND ALBUTEROL SULFATE 2.5; .5 MG/3ML; MG/3ML
1 SOLUTION RESPIRATORY (INHALATION)
Status: DISCONTINUED | OUTPATIENT
Start: 2022-03-07 | End: 2022-03-11 | Stop reason: HOSPADM

## 2022-03-07 RX ORDER — ALBUMIN, HUMAN INJ 5% 5 %
SOLUTION INTRAVENOUS PRN
Status: DISCONTINUED | OUTPATIENT
Start: 2022-03-07 | End: 2022-03-07 | Stop reason: SDUPTHER

## 2022-03-07 RX ORDER — HEPARIN SODIUM 10000 [USP'U]/ML
INJECTION, SOLUTION INTRAVENOUS; SUBCUTANEOUS PRN
Status: DISCONTINUED | OUTPATIENT
Start: 2022-03-07 | End: 2022-03-07 | Stop reason: SDUPTHER

## 2022-03-07 RX ORDER — ETOMIDATE 2 MG/ML
INJECTION INTRAVENOUS PRN
Status: DISCONTINUED | OUTPATIENT
Start: 2022-03-07 | End: 2022-03-07 | Stop reason: SDUPTHER

## 2022-03-07 RX ORDER — SODIUM CHLORIDE 9 MG/ML
25 INJECTION, SOLUTION INTRAVENOUS PRN
Status: DISCONTINUED | OUTPATIENT
Start: 2022-03-07 | End: 2022-03-07 | Stop reason: HOSPADM

## 2022-03-07 RX ORDER — VECURONIUM BROMIDE 1 MG/ML
INJECTION, POWDER, LYOPHILIZED, FOR SOLUTION INTRAVENOUS PRN
Status: DISCONTINUED | OUTPATIENT
Start: 2022-03-07 | End: 2022-03-07 | Stop reason: SDUPTHER

## 2022-03-07 RX ORDER — SODIUM CHLORIDE, SODIUM LACTATE, POTASSIUM CHLORIDE, CALCIUM CHLORIDE 600; 310; 30; 20 MG/100ML; MG/100ML; MG/100ML; MG/100ML
INJECTION, SOLUTION INTRAVENOUS CONTINUOUS PRN
Status: DISCONTINUED | OUTPATIENT
Start: 2022-03-07 | End: 2022-03-07 | Stop reason: SDUPTHER

## 2022-03-07 RX ORDER — SODIUM CHLORIDE 9 MG/ML
10 INJECTION INTRAVENOUS DAILY
Status: COMPLETED | OUTPATIENT
Start: 2022-03-07 | End: 2022-03-07

## 2022-03-07 RX ORDER — SODIUM CHLORIDE 0.9 % (FLUSH) 0.9 %
10 SYRINGE (ML) INJECTION EVERY 12 HOURS SCHEDULED
Status: DISCONTINUED | OUTPATIENT
Start: 2022-03-07 | End: 2022-03-07 | Stop reason: HOSPADM

## 2022-03-07 RX ORDER — CEFAZOLIN SODIUM 1 G/3ML
INJECTION, POWDER, FOR SOLUTION INTRAMUSCULAR; INTRAVENOUS PRN
Status: DISCONTINUED | OUTPATIENT
Start: 2022-03-07 | End: 2022-03-07

## 2022-03-07 RX ORDER — POTASSIUM CHLORIDE 29.8 MG/ML
20 INJECTION INTRAVENOUS PRN
Status: DISCONTINUED | OUTPATIENT
Start: 2022-03-07 | End: 2022-03-08

## 2022-03-07 RX ORDER — LIDOCAINE HYDROCHLORIDE 20 MG/ML
INJECTION, SOLUTION INTRAVENOUS PRN
Status: DISCONTINUED | OUTPATIENT
Start: 2022-03-07 | End: 2022-03-07 | Stop reason: SDUPTHER

## 2022-03-07 RX ORDER — FENTANYL CITRATE 0.05 MG/ML
INJECTION, SOLUTION INTRAMUSCULAR; INTRAVENOUS PRN
Status: DISCONTINUED | OUTPATIENT
Start: 2022-03-07 | End: 2022-03-07 | Stop reason: SDUPTHER

## 2022-03-07 RX ORDER — METOPROLOL TARTRATE 5 MG/5ML
INJECTION INTRAVENOUS PRN
Status: DISCONTINUED | OUTPATIENT
Start: 2022-03-07 | End: 2022-03-07 | Stop reason: SDUPTHER

## 2022-03-07 RX ORDER — GLYCOPYRROLATE 1 MG/5 ML
SYRINGE (ML) INTRAVENOUS PRN
Status: DISCONTINUED | OUTPATIENT
Start: 2022-03-07 | End: 2022-03-07 | Stop reason: SDUPTHER

## 2022-03-07 RX ORDER — CHLORHEXIDINE GLUCONATE 0.12 MG/ML
15 RINSE ORAL ONCE
Status: COMPLETED | OUTPATIENT
Start: 2022-03-07 | End: 2022-03-07

## 2022-03-07 RX ORDER — PANTOPRAZOLE SODIUM 40 MG/10ML
40 INJECTION, POWDER, LYOPHILIZED, FOR SOLUTION INTRAVENOUS DAILY
Status: COMPLETED | OUTPATIENT
Start: 2022-03-07 | End: 2022-03-07

## 2022-03-07 RX ORDER — ALBUMIN, HUMAN INJ 5% 5 %
12.5 SOLUTION INTRAVENOUS ONCE
Status: DISCONTINUED | OUTPATIENT
Start: 2022-03-07 | End: 2022-03-08

## 2022-03-07 RX ORDER — DEXTROSE MONOHYDRATE 25 G/50ML
12.5 INJECTION, SOLUTION INTRAVENOUS PRN
Status: DISCONTINUED | OUTPATIENT
Start: 2022-03-07 | End: 2022-03-08

## 2022-03-07 RX ORDER — SODIUM CHLORIDE 0.9 % (FLUSH) 0.9 %
10 SYRINGE (ML) INJECTION PRN
Status: DISCONTINUED | OUTPATIENT
Start: 2022-03-07 | End: 2022-03-11 | Stop reason: HOSPADM

## 2022-03-07 RX ORDER — MIDAZOLAM HYDROCHLORIDE 1 MG/ML
INJECTION INTRAMUSCULAR; INTRAVENOUS PRN
Status: DISCONTINUED | OUTPATIENT
Start: 2022-03-07 | End: 2022-03-07 | Stop reason: SDUPTHER

## 2022-03-07 RX ORDER — EPINEPHRINE 1 MG/ML
INJECTION INTRAMUSCULAR; INTRAVENOUS; SUBCUTANEOUS PRN
Status: DISCONTINUED | OUTPATIENT
Start: 2022-03-07 | End: 2022-03-07

## 2022-03-07 RX ORDER — EPHEDRINE SULFATE/0.9% NACL/PF 50 MG/5 ML
SYRINGE (ML) INTRAVENOUS PRN
Status: DISCONTINUED | OUTPATIENT
Start: 2022-03-07 | End: 2022-03-07 | Stop reason: SDUPTHER

## 2022-03-07 RX ORDER — SODIUM CHLORIDE 0.9 % (FLUSH) 0.9 %
10 SYRINGE (ML) INJECTION PRN
Status: DISCONTINUED | OUTPATIENT
Start: 2022-03-07 | End: 2022-03-07 | Stop reason: HOSPADM

## 2022-03-07 RX ORDER — SENNA AND DOCUSATE SODIUM 50; 8.6 MG/1; MG/1
1 TABLET, FILM COATED ORAL 2 TIMES DAILY
Status: DISCONTINUED | OUTPATIENT
Start: 2022-03-08 | End: 2022-03-08

## 2022-03-07 RX ORDER — TAMSULOSIN HYDROCHLORIDE 0.4 MG/1
0.4 CAPSULE ORAL DAILY
Status: DISCONTINUED | OUTPATIENT
Start: 2022-03-08 | End: 2022-03-11 | Stop reason: HOSPADM

## 2022-03-07 RX ORDER — MEPERIDINE HYDROCHLORIDE 50 MG/ML
25 INJECTION INTRAMUSCULAR; INTRAVENOUS; SUBCUTANEOUS
Status: COMPLETED | OUTPATIENT
Start: 2022-03-07 | End: 2022-03-07

## 2022-03-07 RX ORDER — SODIUM CHLORIDE 0.9 % (FLUSH) 0.9 %
10 SYRINGE (ML) INJECTION EVERY 12 HOURS SCHEDULED
Status: DISCONTINUED | OUTPATIENT
Start: 2022-03-07 | End: 2022-03-11 | Stop reason: HOSPADM

## 2022-03-07 RX ORDER — ALBUMIN, HUMAN INJ 5% 5 %
SOLUTION INTRAVENOUS
Status: DISPENSED
Start: 2022-03-07 | End: 2022-03-07

## 2022-03-07 RX ORDER — CHLORHEXIDINE GLUCONATE 0.12 MG/ML
15 RINSE ORAL 2 TIMES DAILY
Status: DISCONTINUED | OUTPATIENT
Start: 2022-03-07 | End: 2022-03-08 | Stop reason: ALTCHOICE

## 2022-03-07 RX ORDER — ASPIRIN 81 MG/1
81 TABLET ORAL DAILY
Status: DISCONTINUED | OUTPATIENT
Start: 2022-03-08 | End: 2022-03-08

## 2022-03-07 RX ORDER — ASPIRIN 300 MG/1
300 SUPPOSITORY RECTAL ONCE
Status: COMPLETED | OUTPATIENT
Start: 2022-03-07 | End: 2022-03-07

## 2022-03-07 RX ORDER — CALCIUM CHLORIDE 100 MG/ML
INJECTION INTRAVENOUS; INTRAVENTRICULAR PRN
Status: DISCONTINUED | OUTPATIENT
Start: 2022-03-07 | End: 2022-03-07 | Stop reason: SDUPTHER

## 2022-03-07 RX ORDER — AMINOCAPROIC ACID 250 MG/ML
INJECTION, SOLUTION INTRAVENOUS PRN
Status: DISCONTINUED | OUTPATIENT
Start: 2022-03-07 | End: 2022-03-07 | Stop reason: SDUPTHER

## 2022-03-07 RX ORDER — FENTANYL CITRATE 50 UG/ML
25 INJECTION, SOLUTION INTRAMUSCULAR; INTRAVENOUS
Status: DISCONTINUED | OUTPATIENT
Start: 2022-03-07 | End: 2022-03-08

## 2022-03-07 RX ORDER — PROTAMINE SULFATE 10 MG/ML
INJECTION, SOLUTION INTRAVENOUS PRN
Status: DISCONTINUED | OUTPATIENT
Start: 2022-03-07 | End: 2022-03-07 | Stop reason: SDUPTHER

## 2022-03-07 RX ORDER — DEXTROSE MONOHYDRATE 50 MG/ML
100 INJECTION, SOLUTION INTRAVENOUS PRN
Status: DISCONTINUED | OUTPATIENT
Start: 2022-03-07 | End: 2022-03-08

## 2022-03-07 RX ORDER — OXYCODONE HYDROCHLORIDE 5 MG/1
5 TABLET ORAL EVERY 4 HOURS PRN
Status: DISCONTINUED | OUTPATIENT
Start: 2022-03-07 | End: 2022-03-11 | Stop reason: HOSPADM

## 2022-03-07 RX ORDER — PROPOFOL 10 MG/ML
10 INJECTION, EMULSION INTRAVENOUS CONTINUOUS PRN
Status: DISCONTINUED | OUTPATIENT
Start: 2022-03-07 | End: 2022-03-08 | Stop reason: ALTCHOICE

## 2022-03-07 RX ORDER — FENTANYL CITRATE 50 UG/ML
50 INJECTION, SOLUTION INTRAMUSCULAR; INTRAVENOUS
Status: DISCONTINUED | OUTPATIENT
Start: 2022-03-07 | End: 2022-03-08

## 2022-03-07 RX ORDER — OXYCODONE HYDROCHLORIDE 10 MG/1
10 TABLET ORAL EVERY 4 HOURS PRN
Status: DISCONTINUED | OUTPATIENT
Start: 2022-03-07 | End: 2022-03-11 | Stop reason: HOSPADM

## 2022-03-07 RX ORDER — PANTOPRAZOLE SODIUM 40 MG/10ML
INJECTION, POWDER, LYOPHILIZED, FOR SOLUTION INTRAVENOUS
Status: DISPENSED
Start: 2022-03-07 | End: 2022-03-07

## 2022-03-07 RX ORDER — SODIUM CHLORIDE 9 MG/ML
INJECTION, SOLUTION INTRAVENOUS CONTINUOUS PRN
Status: DISCONTINUED | OUTPATIENT
Start: 2022-03-07 | End: 2022-03-07 | Stop reason: SDUPTHER

## 2022-03-07 RX ORDER — BISACODYL 10 MG
10 SUPPOSITORY, RECTAL RECTAL DAILY PRN
Status: DISCONTINUED | OUTPATIENT
Start: 2022-03-07 | End: 2022-03-08

## 2022-03-07 RX ORDER — SODIUM CHLORIDE 9 MG/ML
INJECTION, SOLUTION INTRAVENOUS CONTINUOUS
Status: DISCONTINUED | OUTPATIENT
Start: 2022-03-07 | End: 2022-03-07

## 2022-03-07 RX ORDER — NICOTINE POLACRILEX 4 MG
15 LOZENGE BUCCAL PRN
Status: DISCONTINUED | OUTPATIENT
Start: 2022-03-07 | End: 2022-03-08

## 2022-03-07 RX ORDER — INSULIN GLARGINE-YFGN 100 [IU]/ML
0.15 INJECTION, SOLUTION SUBCUTANEOUS NIGHTLY
Status: DISCONTINUED | OUTPATIENT
Start: 2022-03-08 | End: 2022-03-08

## 2022-03-07 RX ADMIN — Medication 0.6 MG: at 11:14

## 2022-03-07 RX ADMIN — LIDOCAINE HYDROCHLORIDE 100 MG: 20 INJECTION, SOLUTION INTRAVENOUS at 07:00

## 2022-03-07 RX ADMIN — INSULIN HUMAN 6 UNITS: 100 INJECTION, SOLUTION PARENTERAL at 09:33

## 2022-03-07 RX ADMIN — MEPERIDINE HYDROCHLORIDE 25 MG: 50 INJECTION INTRAMUSCULAR; INTRAVENOUS; SUBCUTANEOUS at 13:15

## 2022-03-07 RX ADMIN — FENTANYL CITRATE 250 MCG: 50 INJECTION, SOLUTION INTRAMUSCULAR; INTRAVENOUS at 08:19

## 2022-03-07 RX ADMIN — FENTANYL CITRATE 250 MCG: 50 INJECTION, SOLUTION INTRAMUSCULAR; INTRAVENOUS at 07:40

## 2022-03-07 RX ADMIN — PROPOFOL 10 MCG/KG/MIN: 10 INJECTION, EMULSION INTRAVENOUS at 11:20

## 2022-03-07 RX ADMIN — VECURONIUM BROMIDE 20 MG: 10 INJECTION, POWDER, LYOPHILIZED, FOR SOLUTION INTRAVENOUS at 07:00

## 2022-03-07 RX ADMIN — FENTANYL CITRATE 25 MCG: 50 INJECTION, SOLUTION INTRAMUSCULAR; INTRAVENOUS at 19:40

## 2022-03-07 RX ADMIN — FENTANYL CITRATE 100 MCG: 50 INJECTION, SOLUTION INTRAMUSCULAR; INTRAVENOUS at 06:54

## 2022-03-07 RX ADMIN — Medication 5 MG: at 07:28

## 2022-03-07 RX ADMIN — FENTANYL CITRATE 50 MCG: 50 INJECTION, SOLUTION INTRAMUSCULAR; INTRAVENOUS at 13:20

## 2022-03-07 RX ADMIN — IPRATROPIUM BROMIDE AND ALBUTEROL SULFATE 1 AMPULE: .5; 2.5 SOLUTION RESPIRATORY (INHALATION) at 15:38

## 2022-03-07 RX ADMIN — ALBUMIN (HUMAN) 500 ML: 12.5 INJECTION, SOLUTION INTRAVENOUS at 10:51

## 2022-03-07 RX ADMIN — AMINOCAPROIC ACID 5000 MG: 250 INJECTION, SOLUTION INTRAVENOUS at 07:05

## 2022-03-07 RX ADMIN — PHENYLEPHRINE HYDROCHLORIDE 100 MCG: 10 INJECTION INTRAVENOUS at 10:17

## 2022-03-07 RX ADMIN — FENTANYL CITRATE 25 MCG: 50 INJECTION, SOLUTION INTRAMUSCULAR; INTRAVENOUS at 22:16

## 2022-03-07 RX ADMIN — METOROPROLOL TARTRATE 2.5 MG: 5 INJECTION, SOLUTION INTRAVENOUS at 07:11

## 2022-03-07 RX ADMIN — CALCIUM CHLORIDE 1 G: 100 INJECTION INTRAVENOUS; INTRAVENTRICULAR at 10:30

## 2022-03-07 RX ADMIN — SODIUM CHLORIDE 5 UNITS/HR: 9 INJECTION, SOLUTION INTRAVENOUS at 08:34

## 2022-03-07 RX ADMIN — Medication 10 ML: at 21:10

## 2022-03-07 RX ADMIN — ASPIRIN 300 MG: 300 SUPPOSITORY RECTAL at 11:47

## 2022-03-07 RX ADMIN — MIDAZOLAM 2 MG: 1 INJECTION INTRAMUSCULAR; INTRAVENOUS at 06:44

## 2022-03-07 RX ADMIN — PHENYLEPHRINE HYDROCHLORIDE 100 MCG: 10 INJECTION INTRAVENOUS at 08:12

## 2022-03-07 RX ADMIN — MUPIROCIN: 20 OINTMENT TOPICAL at 21:10

## 2022-03-07 RX ADMIN — Medication 2000 MG: at 15:32

## 2022-03-07 RX ADMIN — ETOMIDATE 16 MG: 20 INJECTION, SOLUTION INTRAVENOUS at 07:00

## 2022-03-07 RX ADMIN — HEPARIN SODIUM 30000 UNITS: 10000 INJECTION INTRAVENOUS; SUBCUTANEOUS at 08:07

## 2022-03-07 RX ADMIN — Medication 2000 MG: at 22:34

## 2022-03-07 RX ADMIN — VECURONIUM BROMIDE 5 MG: 10 INJECTION, POWDER, LYOPHILIZED, FOR SOLUTION INTRAVENOUS at 09:38

## 2022-03-07 RX ADMIN — FENTANYL CITRATE 750 MCG: 50 INJECTION, SOLUTION INTRAMUSCULAR; INTRAVENOUS at 07:39

## 2022-03-07 RX ADMIN — Medication 2000 MG: at 07:09

## 2022-03-07 RX ADMIN — ALBUMIN (HUMAN) 25 G: 12.5 INJECTION, SOLUTION INTRAVENOUS at 13:49

## 2022-03-07 RX ADMIN — SODIUM CHLORIDE 30 ML/HR: 9 INJECTION, SOLUTION INTRAVENOUS at 11:20

## 2022-03-07 RX ADMIN — FENTANYL CITRATE 150 MCG: 50 INJECTION, SOLUTION INTRAMUSCULAR; INTRAVENOUS at 07:00

## 2022-03-07 RX ADMIN — ALBUMIN (HUMAN) 12.5 G: 12.5 INJECTION, SOLUTION INTRAVENOUS at 17:03

## 2022-03-07 RX ADMIN — PANTOPRAZOLE SODIUM 40 MG: 40 INJECTION, POWDER, FOR SOLUTION INTRAVENOUS at 11:47

## 2022-03-07 RX ADMIN — PROTAMINE SULFATE 300 MG: 10 INJECTION, SOLUTION INTRAVENOUS at 10:18

## 2022-03-07 RX ADMIN — FENTANYL CITRATE 25 MCG: 50 INJECTION, SOLUTION INTRAMUSCULAR; INTRAVENOUS at 18:02

## 2022-03-07 RX ADMIN — CEFAZOLIN SODIUM 2000 MG: 1 INJECTION, POWDER, FOR SOLUTION INTRAMUSCULAR; INTRAVENOUS at 10:23

## 2022-03-07 RX ADMIN — SODIUM NITROPRUSSIDE 0.1 MCG/KG/MIN: 25 INJECTION INTRAVENOUS at 16:12

## 2022-03-07 RX ADMIN — Medication 3 MG: at 11:14

## 2022-03-07 RX ADMIN — VASOPRESSIN 2 UNITS: 20 INJECTION INTRAVENOUS at 10:13

## 2022-03-07 RX ADMIN — IPRATROPIUM BROMIDE AND ALBUTEROL SULFATE 1 AMPULE: .5; 2.5 SOLUTION RESPIRATORY (INHALATION) at 11:18

## 2022-03-07 RX ADMIN — 0.12% CHLORHEXIDINE GLUCONATE 15 ML: 1.2 RINSE ORAL at 21:10

## 2022-03-07 RX ADMIN — 0.12% CHLORHEXIDINE GLUCONATE 15 ML: 1.2 RINSE ORAL at 11:47

## 2022-03-07 RX ADMIN — OXYCODONE 5 MG: 5 TABLET ORAL at 21:12

## 2022-03-07 RX ADMIN — 0.12% CHLORHEXIDINE GLUCONATE 15 ML: 1.2 RINSE ORAL at 05:43

## 2022-03-07 RX ADMIN — VASOPRESSIN 1 UNITS: 20 INJECTION INTRAVENOUS at 10:06

## 2022-03-07 RX ADMIN — IPRATROPIUM BROMIDE AND ALBUTEROL SULFATE 1 AMPULE: .5; 2.5 SOLUTION RESPIRATORY (INHALATION) at 20:43

## 2022-03-07 RX ADMIN — SODIUM CHLORIDE 10 ML: 9 INJECTION INTRAMUSCULAR; INTRAVENOUS; SUBCUTANEOUS at 11:53

## 2022-03-07 RX ADMIN — MIDAZOLAM 2 MG: 1 INJECTION INTRAMUSCULAR; INTRAVENOUS at 06:38

## 2022-03-07 RX ADMIN — SODIUM CHLORIDE: 9 INJECTION, SOLUTION INTRAVENOUS at 05:57

## 2022-03-07 RX ADMIN — MUPIROCIN: 20 OINTMENT TOPICAL at 11:47

## 2022-03-07 RX ADMIN — NITROGLYCERIN 50 MCG: 5 INJECTION, SOLUTION INTRAVENOUS at 08:16

## 2022-03-07 RX ADMIN — FENTANYL CITRATE 25 MCG: 0.05 INJECTION, SOLUTION INTRAMUSCULAR; INTRAVENOUS at 12:35

## 2022-03-07 RX ADMIN — FENTANYL CITRATE 50 MCG: 50 INJECTION, SOLUTION INTRAMUSCULAR; INTRAVENOUS at 11:32

## 2022-03-07 RX ADMIN — AMINOCAPROIC ACID 1 G/HR: 250 INJECTION, SOLUTION INTRAVENOUS at 07:05

## 2022-03-07 RX ADMIN — SODIUM CHLORIDE, POTASSIUM CHLORIDE, SODIUM LACTATE AND CALCIUM CHLORIDE: 600; 310; 30; 20 INJECTION, SOLUTION INTRAVENOUS at 06:38

## 2022-03-07 RX ADMIN — SODIUM CHLORIDE: 9 INJECTION, SOLUTION INTRAVENOUS at 06:38

## 2022-03-07 ASSESSMENT — PULMONARY FUNCTION TESTS
PIF_VALUE: 2
PIF_VALUE: 1
PIF_VALUE: 17
PIF_VALUE: 19
PIF_VALUE: 18
PIF_VALUE: 1
PIF_VALUE: 19
PIF_VALUE: 21
PIF_VALUE: 1
PIF_VALUE: 20
PIF_VALUE: 18
PIF_VALUE: 17
PIF_VALUE: 18
PIF_VALUE: 18
PIF_VALUE: 1
PIF_VALUE: 19
PIF_VALUE: 19
PIF_VALUE: 20
PIF_VALUE: 19
PIF_VALUE: 19
PIF_VALUE: 1
PIF_VALUE: 19
PIF_VALUE: 2
PIF_VALUE: 17
PIF_VALUE: 1
PIF_VALUE: 18
PIF_VALUE: 14
PIF_VALUE: 18
PIF_VALUE: 0
PIF_VALUE: 2
PIF_VALUE: 1
PIF_VALUE: 1
PIF_VALUE: 18
PIF_VALUE: 18
PIF_VALUE: 1
PIF_VALUE: 19
PIF_VALUE: 19
PIF_VALUE: 17
PIF_VALUE: 19
PIF_VALUE: 20
PIF_VALUE: 1
PIF_VALUE: 23
PIF_VALUE: 19
PIF_VALUE: 1
PIF_VALUE: 19
PIF_VALUE: 18
PIF_VALUE: 12
PIF_VALUE: 1
PIF_VALUE: 1
PIF_VALUE: 18
PIF_VALUE: 19
PIF_VALUE: 17
PIF_VALUE: 3
PIF_VALUE: 19
PIF_VALUE: 14
PIF_VALUE: 19
PIF_VALUE: 1
PIF_VALUE: 18
PIF_VALUE: 18
PIF_VALUE: 1
PIF_VALUE: 19
PIF_VALUE: 18
PIF_VALUE: 1
PIF_VALUE: 19
PIF_VALUE: 1
PIF_VALUE: 14
PIF_VALUE: 0
PIF_VALUE: 1
PIF_VALUE: 20
PIF_VALUE: 19
PIF_VALUE: 1
PIF_VALUE: 20
PIF_VALUE: 23
PIF_VALUE: 17
PIF_VALUE: 1
PIF_VALUE: 18
PIF_VALUE: 20
PIF_VALUE: 1
PIF_VALUE: 19
PIF_VALUE: 21
PIF_VALUE: 18
PIF_VALUE: 1
PIF_VALUE: 21
PIF_VALUE: 3
PIF_VALUE: 1
PIF_VALUE: 18
PIF_VALUE: 1
PIF_VALUE: 19
PIF_VALUE: 18
PIF_VALUE: 1
PIF_VALUE: 1
PIF_VALUE: 20
PIF_VALUE: 18
PIF_VALUE: 1
PIF_VALUE: 2
PIF_VALUE: 1
PIF_VALUE: 19
PIF_VALUE: 17
PIF_VALUE: 19
PIF_VALUE: 23
PIF_VALUE: 1
PIF_VALUE: 19
PIF_VALUE: 18
PIF_VALUE: 19
PIF_VALUE: 20
PIF_VALUE: 18
PIF_VALUE: 0
PIF_VALUE: 1
PIF_VALUE: 25
PIF_VALUE: 19
PIF_VALUE: 19
PIF_VALUE: 1
PIF_VALUE: 1
PIF_VALUE: 19
PIF_VALUE: 17
PIF_VALUE: 18
PIF_VALUE: 1
PIF_VALUE: 22
PIF_VALUE: 18
PIF_VALUE: 1
PIF_VALUE: 18
PIF_VALUE: 19
PIF_VALUE: 1
PIF_VALUE: 1
PIF_VALUE: 13
PIF_VALUE: 1
PIF_VALUE: 18
PIF_VALUE: 1
PIF_VALUE: 19
PIF_VALUE: 1
PIF_VALUE: 19
PIF_VALUE: 17
PIF_VALUE: 20
PIF_VALUE: 22
PIF_VALUE: 19
PIF_VALUE: 19
PIF_VALUE: 20
PIF_VALUE: 1
PIF_VALUE: 1
PIF_VALUE: 0
PIF_VALUE: 22
PIF_VALUE: 18
PIF_VALUE: 1
PIF_VALUE: 1
PIF_VALUE: 19
PIF_VALUE: 1
PIF_VALUE: 19
PIF_VALUE: 1
PIF_VALUE: 20
PIF_VALUE: 19
PIF_VALUE: 19
PIF_VALUE: 1
PIF_VALUE: 23
PIF_VALUE: 19
PIF_VALUE: 19
PIF_VALUE: 1
PIF_VALUE: 19
PIF_VALUE: 17
PIF_VALUE: 28
PIF_VALUE: 20
PIF_VALUE: 2
PIF_VALUE: 19
PIF_VALUE: 18
PIF_VALUE: 1
PIF_VALUE: 18
PIF_VALUE: 19
PIF_VALUE: 0
PIF_VALUE: 1
PIF_VALUE: 20
PIF_VALUE: 14
PIF_VALUE: 19
PIF_VALUE: 21
PIF_VALUE: 17
PIF_VALUE: 19
PIF_VALUE: 19
PIF_VALUE: 18
PIF_VALUE: 19
PIF_VALUE: 1
PIF_VALUE: 21
PIF_VALUE: 19
PIF_VALUE: 19
PIF_VALUE: 1
PIF_VALUE: 1
PIF_VALUE: 19
PIF_VALUE: 1
PIF_VALUE: 20
PIF_VALUE: 1
PIF_VALUE: 21
PIF_VALUE: 19
PIF_VALUE: 17
PIF_VALUE: 19
PIF_VALUE: 20
PIF_VALUE: 0
PIF_VALUE: 17
PIF_VALUE: 1
PIF_VALUE: 19
PIF_VALUE: 2
PIF_VALUE: 15
PIF_VALUE: 18
PIF_VALUE: 19
PIF_VALUE: 18
PIF_VALUE: 18
PIF_VALUE: 2
PIF_VALUE: 20
PIF_VALUE: 18
PIF_VALUE: 14
PIF_VALUE: 20
PIF_VALUE: 1
PIF_VALUE: 19
PIF_VALUE: 1
PIF_VALUE: 1
PIF_VALUE: 2
PIF_VALUE: 1
PIF_VALUE: 1
PIF_VALUE: 19
PIF_VALUE: 17
PIF_VALUE: 1
PIF_VALUE: 17
PIF_VALUE: 1
PIF_VALUE: 18
PIF_VALUE: 1
PIF_VALUE: 0
PIF_VALUE: 21
PIF_VALUE: 7
PIF_VALUE: 1
PIF_VALUE: 20
PIF_VALUE: 2
PIF_VALUE: 1
PIF_VALUE: 1
PIF_VALUE: 19
PIF_VALUE: 19
PIF_VALUE: 18
PIF_VALUE: 19
PIF_VALUE: 19
PIF_VALUE: 18
PIF_VALUE: 1
PIF_VALUE: 22
PIF_VALUE: 1
PIF_VALUE: 20
PIF_VALUE: 22
PIF_VALUE: 18
PIF_VALUE: 19
PIF_VALUE: 19
PIF_VALUE: 17
PIF_VALUE: 17
PIF_VALUE: 18
PIF_VALUE: 1
PIF_VALUE: 19
PIF_VALUE: 2
PIF_VALUE: 0
PIF_VALUE: 19
PIF_VALUE: 1
PIF_VALUE: 20
PIF_VALUE: 20
PIF_VALUE: 19
PIF_VALUE: 19
PIF_VALUE: 17
PIF_VALUE: 1
PIF_VALUE: 4
PIF_VALUE: 18

## 2022-03-07 ASSESSMENT — PAIN SCALES - GENERAL
PAINLEVEL_OUTOF10: 6
PAINLEVEL_OUTOF10: 0
PAINLEVEL_OUTOF10: 0
PAINLEVEL_OUTOF10: 7
PAINLEVEL_OUTOF10: 6
PAINLEVEL_OUTOF10: 3
PAINLEVEL_OUTOF10: 6
PAINLEVEL_OUTOF10: 2
PAINLEVEL_OUTOF10: 8
PAINLEVEL_OUTOF10: 6
PAINLEVEL_OUTOF10: 5
PAINLEVEL_OUTOF10: 0
PAINLEVEL_OUTOF10: 6

## 2022-03-07 ASSESSMENT — PAIN DESCRIPTION - ONSET: ONSET: GRADUAL

## 2022-03-07 ASSESSMENT — PAIN DESCRIPTION - FREQUENCY: FREQUENCY: CONTINUOUS

## 2022-03-07 ASSESSMENT — LIFESTYLE VARIABLES: SMOKING_STATUS: 1

## 2022-03-07 ASSESSMENT — PAIN DESCRIPTION - DESCRIPTORS
DESCRIPTORS: ACHING;SHARP
DESCRIPTORS: ACHING;SHARP

## 2022-03-07 ASSESSMENT — PAIN DESCRIPTION - ORIENTATION: ORIENTATION: MID;LOWER

## 2022-03-07 ASSESSMENT — PAIN - FUNCTIONAL ASSESSMENT
PAIN_FUNCTIONAL_ASSESSMENT: PREVENTS OR INTERFERES WITH MANY ACTIVE NOT PASSIVE ACTIVITIES
PAIN_FUNCTIONAL_ASSESSMENT: 0-10

## 2022-03-07 ASSESSMENT — PAIN DESCRIPTION - PAIN TYPE
TYPE: SURGICAL PAIN
TYPE: SURGICAL PAIN

## 2022-03-07 ASSESSMENT — PAIN DESCRIPTION - LOCATION
LOCATION: STERNUM
LOCATION: STERNUM

## 2022-03-07 ASSESSMENT — PAIN DESCRIPTION - PROGRESSION: CLINICAL_PROGRESSION: GRADUALLY WORSENING

## 2022-03-07 NOTE — PROGRESS NOTES
Patient's family friend, Corkyshondajamal Earl, at the bedside. Updated on patient's status. Kassidy Earl took all of patient's belongings home with him.

## 2022-03-07 NOTE — PROGRESS NOTES
Patient placed on pressure support on vent. NP Tesha aware. Also aware of patient being hypertensive with SBP in the 150's to 160's. One time IV push of 25mcg of fentanyl ordered per SALMA Parkinson, see orders.

## 2022-03-07 NOTE — PROGRESS NOTES
Inpatient [facility] Cardiology Post-operative Consultation    Date of Service: 3/7/2022    Reason for Consultation: Post-op CABG/AVR/MVr    SUBJECTIVE:   Patient is a 61year old WM known to Dr. Lyndsey Nicole. He is being seen in consultation this hospital admission by Dr. Katherny Amaro for evaluation post-op CABG/AVR. · Patient seen and examined at bedside this afternoon. · He is POD 0 s/p AVR with 25 mm Pittman Inspiris bioprosthetic, MV repair by decalcification of the anterior leaflet of the mitral valve, CABG x 3 (LIMA-LAD, SVG-Diag, SVG-RCA) and GREGORIA exclusion with 35 mm AtriClip. · Currently ventilated, being weaned at this time. Not requiring pressor support. · Case discussed with nursing staff. PAST MEDICAL HISTORY:  · HLD, on statin therapy. · VHD. · CAD. · Plaque psoriasis, on Humira. CARDIAC TESTING:    TTE (Dr. Lyndsey Nicole, 7/2020)   Summary   Left ventricular size is grossly normal.   Ejection fraction is visually estimated at 65%. No regional wall motion abnormalities seen. Normal left ventricular diastolic filling pattern for age. Moderate to severe aortic stenosis is present. TTE (Dr. Lyndsey Nicole, 12/2021)  Summary   There is severe fibrocalcific sclerosis of the aortic valve with evidence   of severe aortic stenosis with mean gradient across the aortic valve of   approximately 50 mmHg peak gradient of 84 mmHg and calculated aortic valve   area 0.5 square centimeters all suggesting severe/critical aortic   stenosis. Left heart catheterization (Dr. Fabio Garcia, 2/2022)  PRESSURES:  Aorta 122/73 with a mean of 94. The aortic valve appeared heavily calcified on fluoroscopy. CORONARY ANGIOGRAPHY:  LEFT MAIN:  The left main artery did not appear to have any significant  angiographic disease. LAD:  The left anterior descending artery had a long diffusely diseased  mid vessel segment with up to 70% to 80% angiographic luminal narrowing. The distal LAD had mild 40% disease.   A moderate-sized diagonal branch  had 80% proximal vessel stenosis. LCX:  The left circumflex had a 70% proximal vessel stenosis before any  of its branches. The second marginal branch had around 80% ostial  narrowing. The third marginal branch had 50% distal vessel disease. The marginal branches were small vessels. RCA:  The right coronary artery is a dominant vessel. It appeared  calcified in its proximal and middle segment. It was diffusely diseased  in its proximal segment, but with no high-grade lesions. At the level  of the crux, there was an eccentric 60% to 70% (more like 70%) focal  stenosis. The distal vessel did not appear to have any significant  disease. The posterior descending artery branch was a small caliber  vessel which was diffusely diseased with up to 50% distal vessel  narrowing. The posterolateral branch was a long, but again small  caliber vessel which had up to 80% stenosis in its proximal third. The right radial arterial sheath was removed at the end of the procedure  and a TR band was applied with adequate hemostasis and with preservation  of pulse. The patient tolerated the procedure well and left the cardiac  catheterization laboratory in a stable condition. CONCLUSIONS:  1.  Heavily calcified aortic valve (with known severe aortic stenosis on  echocardiogram). 2.  Multivessel coronary artery disease as described above. HOME MEDICATIONS:  Prior to Admission medications    Medication Sig Start Date End Date Taking? Authorizing Provider   amoxicillin (AMOXIL) 500 MG capsule Take 500 mg by mouth every 8 hours   Yes Historical Provider, MD   HYDROcodone-acetaminophen (NORCO) 5-325 MG per tablet Take 1 tablet by mouth every 4 hours as needed for Pain.    Yes Historical Provider, MD   aspirin 81 MG EC tablet TAKE 1 TABLET BY MOUTH EVERY DAY 2/25/22  Yes Herrera Rice MD   CRESTOR 10 MG tablet Take 1 tablet by mouth daily 2/4/22  Yes Herrera Rice MD   adalimumab (HUMIRA) 40 MG/0.8ML injection Inject 40 mg into the skin once Indications: every 2 weeks    Historical Provider, MD   b complex vitamins capsule Take 1 capsule by mouth as needed Only takes occasionally    Historical Provider, MD       CURRENT MEDICATIONS:      Current Facility-Administered Medications:     [START ON 3/8/2022] rosuvastatin (CRESTOR) tablet 20 mg, 20 mg, Oral, Daily, LAVELLE Santos CNP    0.9 % sodium chloride infusion, 30 mL/hr, IntraVENous, Continuous, LAVELLE Santos CNP, Last Rate: 30 mL/hr at 03/07/22 1120, 30 mL/hr at 03/07/22 1120    sodium chloride flush 0.9 % injection 10 mL, 10 mL, IntraVENous, 2 times per day, LAVELLE Santos CNP    sodium chloride flush 0.9 % injection 10 mL, 10 mL, IntraVENous, PRN, LAVELLE Santos - CNP    0.9 % sodium chloride infusion, 25 mL, IntraVENous, PRN, LAVELLE Santos - CNP    ondansetron (ZOFRAN-ODT) disintegrating tablet 4 mg, 4 mg, Oral, Q8H PRN **OR** ondansetron (ZOFRAN) injection 4 mg, 4 mg, IntraVENous, Q6H PRN, LAVELLE Santos - ANSON  Stephen Layer  [START ON 3/8/2022] aspirin EC tablet 81 mg, 81 mg, Oral, Daily, LAVELLE Santos CNP    oxyCODONE (ROXICODONE) immediate release tablet 5 mg, 5 mg, Oral, Q4H PRN **OR** oxyCODONE HCl (OXY-IR) immediate release tablet 10 mg, 10 mg, Oral, Q4H PRN, LAVELLE Santos CNP    fentaNYL (SUBLIMAZE) injection 25 mcg, 25 mcg, IntraVENous, Q1H PRN **OR** fentaNYL (SUBLIMAZE) injection 50 mcg, 50 mcg, IntraVENous, Q1H PRN, LAVELLE Santos - CNP, 50 mcg at 03/07/22 1132    meperidine (DEMEROL) injection 25 mg, 25 mg, IntraVENous, Once PRN, LAVELLE Satnos CNP    chlorhexidine (PERIDEX) 0.12 % solution 15 mL, 15 mL, Mouth/Throat, BID, LAVELLE Santos CNP, 15 mL at 03/07/22 1147    [START ON 3/8/2022] magnesium oxide (MAG-OX) tablet 400 mg, 400 mg, Oral, Daily, LAVELLE Santos CNP    mupirocin (BACTROBAN) 2 % ointment, , Nasal, BID, LAVELLE Santos CNP, Given at 03/07/22 1147   propofol injection, 10 mcg/kg/min, IntraVENous, Continuous PRN, LAVELLE Zuleta CNP, Stopped at 03/07/22 1203    [START ON 3/8/2022] pantoprazole (PROTONIX) tablet 40 mg, 40 mg, Oral, Daily, LAVELLE Zuleta CNP    ceFAZolin (ANCEF) 2000 mg in sterile water 20 mL IV syringe, 2,000 mg, IntraVENous, Q8H, LAVELLE Zuleta - CNP    potassium chloride 20 mEq/50 mL IVPB (Central Line), 20 mEq, IntraVENous, PRN, LAVELLE Zuleta - CNP    magnesium sulfate 2000 mg in 50 mL IVPB premix, 2,000 mg, IntraVENous, PRN, LAVELLE Zuleta - CNP    calcium chloride 1,000 mg in sodium chloride 0.9 % 100 mL IVPB, 1,000 mg, IntraVENous, PRN **OR** calcium chloride 2,000 mg in sodium chloride 0.9 % 100 mL IVPB, 2,000 mg, IntraVENous, PRN, LAVELLE Zuleta - CNP    ipratropium-albuterol (DUONEB) nebulizer solution 1 ampule, 1 ampule, Inhalation, Q4H WA, LAVELLE Zuleta CNP, 1 ampule at 03/07/22 1118    albumin human 5 % IV solution 25 g, 25 g, IntraVENous, PRN, LAVELLE Zuleta - CNP    0.9 % sodium chloride bolus, 250 mL, IntraVENous, Continuous PRN, LAVELLE Zuleta - CNP    norepinephrine (LEVOPHED) 16 mg in dextrose 5% 250 mL infusion (non-weight-based), 1-100 mcg/min, IntraVENous, Continuous PRN, LAVELLE Zuleta - CNP    nitroPRUSSide (NIPRIDE) 50 mg in dextrose 5 % 250 mL infusion, 0.1-3 mcg/kg/min, IntraVENous, Continuous PRN, LAVELLE Zuleta - CNP    insulin regular (HUMULIN R;NOVOLIN R) 100 Units in sodium chloride 0.9 % 100 mL infusion, 1 Units/hr, IntraVENous, Continuous, LAVELLE Zuleta - CNP, Held at 03/07/22 1153    [START ON 3/8/2022] insulin glargine-yfgn (SEMGLEE-YFGN) injection vial 12 Units, 0.15 Units/kg, SubCUTAneous, Nightly, Monica Finder, APRN - CNP    glucose (GLUTOSE) 40 % oral gel 15 g, 15 g, Oral, PRN, Monica Perezer, APRN - CNP    dextrose 50 % IV solution, 12.5 g, IntraVENous, PRN, Monica Finder, APRN - CNP    glucagon (rDNA) injection 1 mg, 1 mg, IntraMUSCular, PRN, Eilleen Borer, APRN - CNP    dextrose 5 % solution, 100 mL/hr, IntraVENous, PRN, Eilleen Borer, APRN - CNP  Guan  [START ON 3/8/2022] sennosides-docusate sodium (SENOKOT-S) 8.6-50 MG tablet 1 tablet, 1 tablet, Oral, BID, Eilleen Borer, APRN - CNP    bisacodyl (DULCOLAX) suppository 10 mg, 10 mg, Rectal, Daily PRN, Eilleen Borer, APRN - CNP  Guan  Arlena Kanner ON 3/8/2022] bisacodyl (DULCOLAX) EC tablet 5 mg, 5 mg, Oral, Daily, Eilleen Borer, APRN - CNP    insulin lispro (HUMALOG) injection vial 0-18 Units, 0-18 Units, SubCUTAneous, Q4H, Eilleen Borer, APRN - CNP  Guan  [START ON 3/8/2022] tamsulosin (FLOMAX) capsule 0.4 mg, 0.4 mg, Oral, Daily, Eilleen Borer, APRN - CNP    propofol 1000 MG/100ML injection, , , ,     pantoprazole (PROTONIX) 40 MG injection, , , ,     albumin human 5 % IV solution, , , ,       ALLERGIES:  Patient has no known allergies. REVIEW OF SYSTEMS:     Unable to obtain at this time as patient is current mechanically ventilated, coming off sedation. PHYSICAL EXAM:   /74   Pulse 73   Temp 97.3 °F (36.3 °C) (Bladder)   Resp 15   Ht 5' 10\" (1.778 m)   Wt 170 lb 9.6 oz (77.4 kg)   SpO2 100%   BMI 24.48 kg/m²   CONST:  Well developed, well nourished WM who appears stated age. Intubated, coming off sedation. HEENT:   Head- Normocephalic, atraumatic. Neck-  No stridor, trachea midline. CHEST: Chest symmetrical. No accessory muscle use or intercostal retractions. RESPIRATORY: Lung sounds - mechanically ventilated. Chest tubes present. CARDIOVASCULAR:     No noted carotid bruit. Heart Ausculation- Regular rate and rhythm, no significant murmur appreciated. PV: No lower extremity edema. Pedal pulses palpable, no clubbing or cyanosis. ABDOMEN: Soft. SKIN: Warm and dry. NEURO / PSYCH: Coming off sedation, remains intubated.        DATA:    Telemetry: NSR with HR in the 80s    Diagnostic:  All diagnostic testing and lab work thus far this admission reviewed in detail. CXR 3/7/2022:  Impression  1.  1st postoperative study after mid sternotomy as above commented. 2.  Difficult to trace the NG tube lower chest/upper abdomen.  Consider  correlation with plain radiographs of the abdomen for NG tube localization. Intake/Output Summary (Last 24 hours) at 3/7/2022 1205  Last data filed at 3/7/2022 1145  Gross per 24 hour   Intake 2985 ml   Output 1465 ml   Net 1520 ml       Labs:   CBC:   Recent Labs     03/07/22  1027 03/07/22  1122   WBC  --  9.4   HGB  --  11.3*   HCT 23.0* 33.3*   PLT  --  103*     BMP:   Recent Labs     03/07/22  0930 03/07/22  1027   K 5.0 4.2   CREATININE 1.1 0.9     HgA1c:   Lab Results   Component Value Date    LABA1C 4.9 03/03/2022     PT/INR:   Recent Labs     03/07/22 1122   PROTIME 16.6*   INR 1.5     APTT:  Recent Labs     03/07/22 1122   APTT 33.4     FASTING LIPID PANEL:  Lab Results   Component Value Date    CHOL 211 02/04/2022    HDL 73 02/04/2022    LDLCALC 127 02/04/2022    TRIG 54 02/04/2022         ASSESSMENT:  · CAD/VHD s/p CABG x 3 (LIMA-LAD, SVG-diagonal, SVG-RCA), AVR, MV repair and GREGORIA exclusion with Atriclip 3/7/2022. · Post-operative respiratory insufficiency, current mechanically ventilated. · Post-operative anemia/thrombocytopenia, stable. · HLD. · Psoriasis, on Humira. RECOMMENDATIONS:  · Critical care management as per CT surgery/critical care team.   · Resume home cardiac medications (ASA, statin) once tolerated. · Further recommendations to follow as per Dr. Zenaida Machado. Further assessment and plan to follow as per Dr. Zenaida Machado. NOTE: This report was transcribed using voice recognition software. Every effort was made to ensure accuracy; however, inadvertent computerized transcription errors may be present.     [facility], [place] [address] Cardiology    Electronically signed by Jim High PA-C on 3/7/2022 at 12:05 PM      I have personally seen and evaluated the patient. I personally obtained the history and performed the physical exam.  They are currently pain free. I personally reviewed all of the above labs and data. All of the above assessments and recommendations are from me. All of the above cardiac medical decisions are from me. Was seen postop. He was still intubated, sedated, and ventilated. ENT: clear, no bleeding. No external masses. Lips normal formation. Neck: supple, full ROM, no JVD, no bruits, no lymphadenopathy. No masses. trachea midline. Heart: Pericardial friction rub present. Distant to auscultation. Regular rate & rhythm, normal S1 & S2, I did not appreciate a murmur, S4 gallop. No heave. Lungs: CTA while on the vent. No accessory muscles. Abd: soft, non-tender. Normal bowel sounds. Neuro: Sedation is being weaned. He is starting to move his extremities, EOMI, no tremors. EXT: No bilateral lower extremity edema  Skin: warm, dry, intact. Good turgor. Assessment/recommendations:  1. Coronary artery disease. Status post CABG x3 today. 2. Aortic stenosis. Status post bioprosthetic AVR today. 3. Decalcification mitral valve repair today. 4. Left atrial appendage atrial clip today. 5. No known history of diabetes but on insulin drip postop. 6. Psoriasis  7. Hypercholesterolemia. 6. Being weaned from the vent today. Resume cardiac medications as vitals allow. He is not on pressors postop. Electronically signed by Eliceo Flores DO on 3/7/2022 at 12:58 PM    Note: This report was completed using computerized voice recognition software. Every effort has been made to ensure accuracy, however; and invert and computerized transcription errors may be present.

## 2022-03-07 NOTE — PROGRESS NOTES
Patient signed out of anesthesia, patient remains on ventilator. Wakes up and follows commands, remains sleepy.

## 2022-03-07 NOTE — PROGRESS NOTES
CVICU Admission Note    Name: Zeinab Andrews  MRN: [id number]    CC: Postoperative Critical Care Management     Indication for Surgery/Procedure: AS, CAD  LVEF:  60%   RVF:  normal    Important/Relevant PMH/PSH: Plaque psoriasis, on Humira- follows with Rheumatology, current smoker     Procedure/Surgeries: 3/7/2022 Sternotomy/Aortic valve replacement with 25 mm Pittman Inspiris bioprosthetic/Mitral valve repair by decalcification of the anterior leaflet of the mitral valve/CABG x 3 (LIMA-LAD, SVG-Diag, SVG-RCA)/GREGORIA exclusion with 35 mM AtriClip/EVH LLE/Rigid internal fixation of the sternum with Александр plates x 2    Pacing wires: Ventricular       Physical Exam:    BP (!) 140/74   Pulse 64   Temp 97.9 °F (36.6 °C) (Temporal)   Resp 20   Ht 5' 10\" (1.778 m)   Wt 170 lb 9.6 oz (77.4 kg)   SpO2 97%   BMI 24.48 kg/m²     Recent Labs     03/07/22  1027   HCT 23.0*     Recent Labs     03/07/22  1027   K 4.2   CREATININE 0.9         Post operative CXR:     - Atelectasis, No pneumothorax noted, Mildly increased vascular markings bilateral, No significant pleural effusion. ETT/lines/drains appear to be in proper position. Final Radiology report pending. General Appearance: Arrived to ICU intubated, sedated. Hemodynamically stable. Eyes: PERRL  Pulmonary: Diminished bibasilar.   No wheezes, no accessory muscle use noted   Ventilator: Mode: AC/VC 14, 50% FiO2, 7 PEEP, 500 Vt   Cardiovascular: RRR, no heaves or thrills palpated   Telemetry: SR 70s  Abdomen: Soft, OG to LIWS   Extremities: Palpable pulses all extremities, no edema   Neurologic/Psych: Sedation    Skin: Warm and dry    Incision: MSI YAZMIN intact,LLE SVG sites with ace wrap     Assessment/Plan: Day of Surgery     1. AS, CAD S/p Aortic valve replacement with 25 mm Pittman Inspiris bioprosthetic, Mitral valve repair by decalcification of the anterior leaflet of the mitral valve, CABG x 3 (LIMA-LAD, SVG-Diag, SVG-RCA),GREGORIA exclusion with 35 mM

## 2022-03-07 NOTE — PLAN OF CARE
Problem: Activity Intolerance:  Goal: Able to perform prescribed physical activity  Description: Able to perform prescribed physical activity  Outcome: Ongoing     Problem:  Activity Intolerance:  Goal: Ability to tolerate increased activity will improve  Description: Ability to tolerate increased activity will improve  Outcome: Ongoing     Problem: Cardiac Output - Decreased:  Goal: Cardiac output within specified parameters  Description: Cardiac output within specified parameters  Outcome: Ongoing     Problem: Cardiac Output - Decreased:  Goal: Hemodynamic stability will improve  Description: Hemodynamic stability will improve  Outcome: Ongoing     Problem: Fluid Volume - Imbalance:  Goal: Ability to achieve a balanced intake and output will improve  Description: Ability to achieve a balanced intake and output will improve  Outcome: Ongoing     Problem: Fluid Volume - Imbalance:  Goal: Chest tube drainage is within specified parameters  Description: Chest tube drainage is within specified parameters  Outcome: Ongoing     Problem: Gas Exchange - Impaired:  Goal: Levels of oxygenation will improve  Description: Levels of oxygenation will improve  Outcome: Ongoing     Problem: Gas Exchange - Impaired:  Goal: Ability to maintain adequate ventilation will improve  Description: Ability to maintain adequate ventilation will improve  Outcome: Ongoing

## 2022-03-07 NOTE — ANESTHESIA PRE PROCEDURE
Department of Anesthesiology  Preprocedure Note       Name:  Ashlyn White   Age:  61 y.o.  :  1955                                          MRN:  [id number]         Date:  3/7/2022      Surgeon: Jose Antonio Cabrera):  Cheli Berg MD    Procedure: Procedure(s):  CABG CORONARY ARTERY BYPASS, AVR    Medications prior to admission:   Prior to Admission medications    Medication Sig Start Date End Date Taking? Authorizing Provider   amoxicillin (AMOXIL) 500 MG capsule Take 500 mg by mouth every 8 hours   Yes Historical Provider, MD   HYDROcodone-acetaminophen (NORCO) 5-325 MG per tablet Take 1 tablet by mouth every 4 hours as needed for Pain.    Yes Historical Provider, MD   aspirin 81 MG EC tablet TAKE 1 TABLET BY MOUTH EVERY DAY 22  Yes Herrera Rice MD   CRESTOR 10 MG tablet Take 1 tablet by mouth daily 22  Yes Herrera Rice MD   adalimumab (HUMIRA) 40 MG/0.8ML injection Inject 40 mg into the skin once Indications: every 2 weeks    Historical Provider, MD   b complex vitamins capsule Take 1 capsule by mouth as needed Only takes occasionally    Historical Provider, MD       Current medications:    Current Facility-Administered Medications   Medication Dose Route Frequency Provider Last Rate Last Admin    0.9 % sodium chloride infusion   IntraVENous Continuous Cheli Berg  mL/hr at 22 0557 New Bag at 22 0557    0.9 % sodium chloride infusion  25 mL IntraVENous PRN Cheli Berg MD        ceFAZolin (ANCEF) 2000 mg in sterile water 20 mL IV syringe  2,000 mg IntraVENous On Call to Gila Christian MD        chlorhexidine (HIBICLENS) 4 % liquid   Topical Once Cheli Berg MD        sodium chloride flush 0.9 % injection 10 mL  10 mL IntraVENous 2 times per day Cheli Berg MD        sodium chloride flush 0.9 % injection 10 mL  10 mL IntraVENous PRN Cheli Berg MD           Allergies:  No Known Allergies    Problem List:    Patient Active Problem List   Diagnosis Code    Severe aortic stenosis I35.0    Psoriasis L40.9    Bicuspid aortic valve Q23.1    Immunosupp[facility] due to drug therapy ([facility] U58.457, Z79.899    CAD in native artery I25.10       Past Medical History:        Diagnosis Date    Aortic stenosis 2021    CAD in native artery 2/4/2022    Hyperlipidemia        Past Surgical History:        Procedure Laterality Date    HERNIA REPAIR      8-10 years ago ([id number])       Social History:    Social History     Tobacco Use    Smoking status: Current Some Day Smoker     Years: 20.00     Types: Cigars    Smokeless tobacco: Never Used    Tobacco comment: occasionally a cigar   Substance Use Topics    Alcohol use: Yes     Comment: occasionally beer during week                                Ready to quit: Not Answered  Counseling given: Not Answered  Comment: occasionally a cigar      Vital Signs (Current):   Vitals:    03/07/22 0537   BP: (!) 140/74   Pulse: 64   Resp: 20   Temp: 97.9 °F (36.6 °C)   TempSrc: Temporal   SpO2: 97%   Weight: 170 lb 9.6 oz (77.4 kg)   Height: 5' 10\" (1.778 m)                                              BP Readings from Last 3 Encounters:   03/07/22 (!) 140/74   03/03/22 134/69   02/22/22 (!) 143/93       NPO Status: Time of last liquid consumption: 2300                        Time of last solid consumption: 1100                        Date of last liquid consumption: 03/06/22                        Date of last solid food consumption: 03/06/22    BMI:   Wt Readings from Last 3 Encounters:   03/07/22 170 lb 9.6 oz (77.4 kg)   03/03/22 170 lb 9.6 oz (77.4 kg)   02/22/22 165 lb (74.8 kg)     Body mass index is 24.48 kg/m².     CBC:   Lab Results   Component Value Date    WBC 5.0 03/03/2022    RBC 4.75 03/03/2022    HGB 14.9 03/03/2022    HCT 44.3 03/03/2022    MCV 93.3 03/03/2022    RDW 13.0 03/03/2022     03/03/2022       CMP:   Lab Results   Component Value Date     03/03/2022    K 4.3 03/03/2022     03/03/2022    CO2 31 03/03/2022    BUN 9 03/03/2022    CREATININE 1.0 03/03/2022    GFRAA >60 03/03/2022    LABGLOM >60 03/03/2022    GLUCOSE 97 03/03/2022    PROT 8.3 03/03/2022    CALCIUM 9.6 03/03/2022    BILITOT 0.7 03/03/2022    ALKPHOS 51 03/03/2022    AST 15 03/03/2022    ALT 11 03/03/2022       POC Tests: No results for input(s): POCGLU, POCNA, POCK, POCCL, POCBUN, POCHEMO, POCHCT in the last 72 hours. Coags:   Lab Results   Component Value Date    PROTIME 11.3 03/03/2022    INR 1.0 03/03/2022    APTT 32.7 03/03/2022       HCG (If Applicable): No results found for: PREGTESTUR, PREGSERUM, HCG, HCGQUANT     ABGs: No results found for: PHART, PO2ART, MJE6IKY, VAV0NJE, BEART, [id number]     Type & Screen (If Applicable):  No results found for: LABABO, LABRH    Drug/Infectious Status (If Applicable):  No results found for: HIV, HEPCAB    COVID-19 Screening (If Applicable):   Lab Results   Component Value Date    COVID19 Not Detected 03/03/2022           Anesthesia Evaluation  Patient summary reviewed  Airway: Mallampati: II  TM distance: >3 FB   Neck ROM: full  Mouth opening: > = 3 FB Dental:      Comment: None loose    Pulmonary:normal exam  breath sounds clear to auscultation  (+) current smoker                           Cardiovascular:    (+) valvular problems/murmurs: AS, CAD:,         Rhythm: regular  Rate: normal                    Neuro/Psych:   Negative Neuro/Psych ROS              GI/Hepatic/Renal: Neg GI/Hepatic/Renal ROS            Endo/Other: Negative Endo/Other ROS                    Abdominal:             Vascular: negative vascular ROS. Other Findings:             Anesthesia Plan      general     ASA 4       Induction: intravenous. arterial line, DUKE, BIS, central line and CVP  MIPS: Postoperative opioids intended, Prophylactic antiemetics administered and Postoperative ventilation. Anesthetic plan and risks discussed with patient. Plan discussed with CRNA.                   Agus Harmon DO 3/7/2022

## 2022-03-07 NOTE — OP NOTE
[address]                  [address] [place],  [facility]                                OPERATIVE REPORT    PATIENT NAME: Ibis Benitez                  :        1955  MED REC NO:   [id number]                            ROOM:       56  ACCOUNT NO:   [de-identified]                           ADMIT DATE: 2022  PROVIDER:     Jayesh Rodgers MD    DATE OF PROCEDURE:  2022    PREOPERATIVE DIAGNOSES:  Severe aortic stenosis, severe multivessel  coronary artery disease, and granulomatous lung disease. POSTOPERATIVE DIAGNOSES:  Severe aortic stenosis, severe multivessel  coronary artery disease, and granulomatous lung disease. INDICATIONS:  Severe aortic stenosis, severe multivessel coronary artery  disease, and granulomatous lung disease. SURGEON:  Jayesh Rodgers MD    ASSISTANT:  Lynda Queen DO (no other resident was adequately trained  to be able to assist). Dr. Courtney Ardon was present assisting throughout the  critical portion of the operation. SECOND ASSISTANTS:  1. Roselyn Perez RN  2. RAJAT Richardson  3. RAJAT Whitley (Yrn Staton harvested the vein). COMPLICATIONS:  None, tolerated well. ESTIMATED BLOOD LOSS:  Approximately 1000 mL. ANESTHESIA:  General endotracheal.    ANESTHESIA ATTENDING:  Anuj Christine DO    SPECIMENS OBTAINED:  Include the aortic valve as well as calcification  of the anterior leaflet of the mitral valve. DRAINS:  Two in the mediastinum, one in the left chest and one in the  right chest.    OPERATIONS:  1. Sternotomy. 2.  Aortic valve replacement with a 25 mm Pittman Inspiris  bioprosthesis. 3.  Mitral valve repair by decalcification of the anterior leaflet of  the mitral valve. 4.  Coronary artery bypass grafting x3, left internal mammary artery to  LAD, saphenous vein graft of the diagonal branch to the LAD, saphenous  vein graft to the right coronary artery.   5.  Left atrial appendage occlusion with a 35 mm AtriClip. 6.  Endoscopic harvesting, left lower extremity greater saphenous vein. 7.  Rigid internal fixation of the sternum with Александр plates x2. HISTORY:  This is a 79-year-old man who was found to have severe aortic  stenosis. He admitted to some fatigue and cardiac cath showed severe multivessel  coronary artery disease. He was referred for surgical intervention. Heart team evaluation was performed to decide between a transcatheter  valve versus surgical aortic valve and the heart team felt that he was  best severed with surgical intervention. The patient was described the  procedure in full including the risks and complications including but  not limited to bleeding, infection, need for reoperation, hemothorax,  pneumothorax, stroke, myocardial infarction, and death, and the patient  agreed to proceed. FINDINGS:  Preoperative DUKE revealed preserved ejection fraction with  severe aortic stenosis. Intraoperatively, there was a bicuspid aortic  valve with fusion of the left and right coronary cusps. The aorta was a  little bit thinner than normal, but not horrible. Left internal mammary  artery was a good conduit for bypass. The vein was good conduit for  bypass. The LAD was a 2 mm vessel and a good target for bypass. The  diagonal branch was a 1.8 mm vessel and a fair target for bypass. The  right coronary artery was a 2 mm vessel, little bit calcified, but a  good target for bypass. I did look at the obtuse marginal and I did not  feel they are large enough to graft. The patient was  from  cardiopulmonary bypass without the assistance of any inotropic support. Postoperative DUKE revealed preserved ejection fraction with a  well-functioning bioprosthesis without transvalvular or paravalvular  leak and a mean gradient of 8. All sponges, instruments, and needle  counts were correct at the end of the case.   The patient was transferred  to the ascending  aorta and a proximal anastomosis was created using 6-0 Prolene. The  mammary was brought into the field and was grafted to the LAD in its mid  portion using 7-0 Prolene. The pedicle was tacked to the epicardium. A  35-mm AtriClip was placed at the base of atrial appendage. Standard  aortotomy was performed. Visualization of the aortic valve was  excellent. The above findings were noted. There was calcification of  the pseudo-commissure between the right and left coronary cusps. The  valve was cut away from the annulus and the annulus was extensively and  meticulously debrided of all particular matter. There was extensive  calcification going down the back side of the anterior leaflets of the  mitral valve and therefore this was removed and decalcification of the  anterior leaflet of the mitral valve. We then irrigated the roots with  the left main coronary ostium covered and retrograde cardioplegia  running. I then measured to fit a 25 mm Pittman Inspiris bioprosthesis. 2-0 Ethibond stitches were placed circumferentially on the annulus with  pledgets on the ventricular side. These were passed through the valve  and the valve was seated appropriately and all sutures were tied using  Cor-Knot. I closed the aortotomy in two layers with 4-0 Prolene. Prior  to doing this, I allowed the ventricle to fill with blood to remove as  much air as possible, and for the same reason, CO2 was brought into the  field throughout the entire operation. The final proximal anastomosis  of the right coronary artery graft was placed in the ascending aorta  using 6-0 Prolene. Warm antegrade and retrograde hotshots were given. The cross-clamp was released. The patient returned to sinus rhythm. Ventricular pacing wire was placed.   Two drains were placed in the  mediastinum, one in the left chest and one in the right chest.  The  patient was easily weaned from cardiopulmonary bypass without the  assistance of any inotropic support. Once off bypass, the patient was  decannulated in the usual fashion. Protamine was given. Mediastinum  was inspected for hemostasis. Hemostasis was achieved using Bovie  electrocautery and stitches. With the patient warm and hemodynamically  stable in a sinus rhythm, I closed the chest using #6 steel wires  including double wires as well as rigid internal fixation of the sternum  with Skidmore plates x2. The remainder of the wound was closed in  multiple layers of absorbable stitch. Dressings were applied over top. The patient tolerated the procedure well. The patient was transferred  to cardiothoracic intensive care unit in stable, guarded condition. All  sponge, instruments, and needle counts were correct at the end of the  case.         Louise Baltazar MD    D: 03/07/2022 11:11:34       T: 03/07/2022 13:09:01     [email or web address]  Job#: [id number]     Doc#: [id number]    CC:  DO Reina Wilburn MD       [facility], [address] [place] [address] Bruno       Jessy Lilly MD

## 2022-03-07 NOTE — ANESTHESIA PROCEDURE NOTES
Procedure Performed: DUKE     Start Time:        End Time:      Preanesthesia Checklist:  Patient identified, IV assessed, risks and benefits discussed, monitors and equipment assessed, procedure being performed at surgeon's request and anesthesia consent obtained. General Procedure Information  Diagnostic Indications for Echo:  assessment of surgical repair, hemodynamic monitoring and assessment of valve function  Physician Requesting Echo: Cheli Berg MD  Location performed:  OR  Intubated  Bite block placed  Heart visualized  Probe Insertion:  Easy  Probe Type:  3D and mulitplane  Modalities:  3D, color flow mapping, pulse wave Doppler and continuous wave Doppler    Echocardiographic and Doppler Measurements    Ventricles    Right Ventricle:  Cavity size normal.  Hypertrophy not present. Thrombus not present. Global function normal.    Left Ventricle:  Cavity size normal.  Hypertrophy not present. Thrombus not present. Global Function normal.  Ejection Fraction 60%. Ventricular Regional Function:  1- Basal Anteroseptal:  normal  2- Basal Anterior:  normal  3- Basal Anterolateral:  normal  4- Basal Inferolateral:  normal  5- Basal Inferior:  normal  6- Basal Inferoseptal:  normal  7- Mid Anteroseptal:  normal  8- Mid Anterior:  normal  9- Mid Anterolateral:  normal  10- Mid Inferolateral:  normal  11- Mid Inferior:  normal  12- Mid Inferoseptal:  normal  13- Apical Anterior:  normal  14- Apical Lateral:  normal  15- Apical Inferior:  normal  16- Apical Septal:  normal  17- [place]:  normal      Valves    Aortic Valve: Annulus normal.  Stenosis severe. Regurgitation none. Leaflets calcified and bicuspid. Leaflet motions restricted. Mitral Valve: Annulus normal.  Stenosis not present. Regurgitation mild. Leaflets normal.  Leaflet motions normal.      Tricuspid Valve: Annulus normal.  Stenosis not present. Regurgitation mild.   Leaflets normal.  Leaflet motions normal.    Pulmonic Valve: Annulus normal.  Stenosis not present. Regurgitation none. Aorta    Ascending Aorta:  Size normal.  Dissection not present. Aortic Arch:  Size normal.  Dissection not present. Descending Aorta:  Size normal.  Dissection not present. Other Aortic Findings:       Jackqueline Pleasure grade 1 atheroma throughout the aorta    Atria    Right Atrium:  Size normal.  Spontaneous echo contrast not present. Thrombus not present. Tumor not present. Device not present. Left Atrium:  Size normal.  Spontaneous echo contrast not present. Thrombus not present. Tumor not present. Device not present. Left atrial appendage normal.      Septa    Atrial Septum:  Intra-atrial septal morphology normal.      Ventricular Septum:  Intra-ventricular septum morphology normal.          Other Findings  Pericardium:  normal  Pleural Effusion:  none  Pulmonary Arteries:  normal  Pulmonary Venous Flow:  blunted (decreased) systolic flow    Anesthesia Information  Performed Personally  Anesthesiologist:  Adilene Wills DO      Echocardiogram Comments:       S/p AVR CABG x 3.  Valve seated well. No regurgitation. Velocity ~2m/s MG ~ 8mHg. LVEF ~ 60%. Normal RVEF.   All findings d/w surgeon

## 2022-03-07 NOTE — PROGRESS NOTES
Patient arrived to [address] [place] [address] bed 51 with OR staff. Chest tubes connected to suction, patient connected to ventilator and all ICU monitoring.

## 2022-03-07 NOTE — ANESTHESIA POSTPROCEDURE EVALUATION
Department of Anesthesiology  Postprocedure Note    Patient: Benjamín Tavares  MRN: [id number]  YOB: 1955  Date of evaluation: 3/7/2022  Time:  2:01 PM     Procedure Summary     Date: 03/07/22 Room / Location: [facility] 01 / [facility]    Anesthesia Start: 1102 Anesthesia Stop: 2758    Procedure: CABG CORONARY ARTERY BYPASS, AVR (N/A ) Diagnosis: (CAD, AS)    Surgeons: Manjinder Cortes MD Responsible Provider: Lord Shankar DO    Anesthesia Type: general ASA Status: 4          Anesthesia Type: general    Yaneth Phase I: Yaneth Score: 10    Yaneth Phase II:      Last vitals: Reviewed and per EMR flowsheets.        Anesthesia Post Evaluation    Patient location during evaluation: ICU  Patient participation: complete - patient cannot participate  Level of consciousness: sedated and ventilated  Airway patency: patent  Nausea & Vomiting: no nausea and no vomiting  Complications: no  Cardiovascular status: blood pressure returned to baseline  Respiratory status: acceptable  Hydration status: euvolemic

## 2022-03-07 NOTE — ANESTHESIA PRE PROCEDURE
Department of Anesthesiology  Preprocedure Note       Name:  Benjamín Tavares   Age:  61 y.o.  :  1955                                          MRN:  [id number]         Date:  3/7/2022      Surgeon: Rory Neves):  Manjinder Cortes MD    Procedure: Procedure(s):  CABG CORONARY ARTERY BYPASS, AVR    Medications prior to admission:   Prior to Admission medications    Medication Sig Start Date End Date Taking? Authorizing Provider   amoxicillin (AMOXIL) 500 MG capsule Take 500 mg by mouth every 8 hours   Yes Historical Provider, MD   HYDROcodone-acetaminophen (NORCO) 5-325 MG per tablet Take 1 tablet by mouth every 4 hours as needed for Pain.    Yes Historical Provider, MD   aspirin 81 MG EC tablet TAKE 1 TABLET BY MOUTH EVERY DAY 22  Yes Trupti Bush MD   CRESTOR 10 MG tablet Take 1 tablet by mouth daily 22  Yes Trupti Bush MD   adalimumab (HUMIRA) 40 MG/0.8ML injection Inject 40 mg into the skin once Indications: every 2 weeks    Historical Provider, MD   b complex vitamins capsule Take 1 capsule by mouth as needed Only takes occasionally    Historical Provider, MD       Current medications:    Current Facility-Administered Medications   Medication Dose Route Frequency Provider Last Rate Last Admin    0.9 % sodium chloride infusion   IntraVENous Continuous Manjinder Cortes MD        0.9 % sodium chloride infusion  25 mL IntraVENous PRN Manjinder Cortes MD        ceFAZolin (ANCEF) 2000 mg in sterile water 20 mL IV syringe  2,000 mg IntraVENous On Call to Porsche Lyons MD        chlorhexidine (HIBICLENS) 4 % liquid   Topical Once Manjinder Cortes MD        sodium chloride flush 0.9 % injection 10 mL  10 mL IntraVENous 2 times per day Manjinder Cortes MD        sodium chloride flush 0.9 % injection 10 mL  10 mL IntraVENous PRN Manjinder Cortes MD           Allergies:  No Known Allergies    Problem List:    Patient Active Problem List   Diagnosis Code    Severe aortic stenosis I35.0    Psoriasis L40.9    Bicuspid aortic valve Q23.1    Immunosuppression due to drug therapy ([facility] [address] D17.108, Z79.899    CAD in native artery I25.10       Past Medical History:        Diagnosis Date    Aortic stenosis 2021    CAD in native artery 2/4/2022    Hyperlipidemia        Past Surgical History:        Procedure Laterality Date    HERNIA REPAIR      8-10 years ago ([id number])       Social History:    Social History     Tobacco Use    Smoking status: Current Some Day Smoker     Years: 20.00     Types: Cigars    Smokeless tobacco: Never Used    Tobacco comment: occasionally a cigar   Substance Use Topics    Alcohol use: Yes     Comment: occasionally beer during week                                Ready to quit: Not Answered  Counseling given: Not Answered  Comment: occasionally a cigar      Vital Signs (Current):   Vitals:    03/07/22 0537   BP: (!) 140/74   Pulse: 64   Resp: 20   Temp: 36.6 °C (97.9 °F)   TempSrc: Temporal   SpO2: 97%   Weight: 170 lb 9.6 oz (77.4 kg)   Height: 5' 10\" (1.778 m)                                              BP Readings from Last 3 Encounters:   03/07/22 (!) 140/74   03/03/22 134/69   02/22/22 (!) 143/93       NPO Status: Time of last liquid consumption: 2300                        Time of last solid consumption: 1100                        Date of last liquid consumption: 03/06/22                        Date of last solid food consumption: 03/06/22    BMI:   Wt Readings from Last 3 Encounters:   03/07/22 170 lb 9.6 oz (77.4 kg)   03/03/22 170 lb 9.6 oz (77.4 kg)   02/22/22 165 lb (74.8 kg)     Body mass index is 24.48 kg/m².     CBC:   Lab Results   Component Value Date    WBC 5.0 03/03/2022    RBC 4.75 03/03/2022    HGB 14.9 03/03/2022    HCT 44.3 03/03/2022    MCV 93.3 03/03/2022    RDW 13.0 03/03/2022     03/03/2022       CMP:   Lab Results   Component Value Date     03/03/2022    K 4.3 03/03/2022     03/03/2022    CO2 31 03/03/2022    BUN 9 03/03/2022    CREATININE 1.0 03/03/2022    GFRAA >60 03/03/2022    LABGLOM >60 03/03/2022    GLUCOSE 97 03/03/2022    PROT 8.3 03/03/2022    CALCIUM 9.6 03/03/2022    BILITOT 0.7 03/03/2022    ALKPHOS 51 03/03/2022    AST 15 03/03/2022    ALT 11 03/03/2022       POC Tests: No results for input(s): POCGLU, POCNA, POCK, POCCL, POCBUN, POCHEMO, POCHCT in the last 72 hours. Coags:   Lab Results   Component Value Date    PROTIME 11.3 03/03/2022    INR 1.0 03/03/2022    APTT 32.7 03/03/2022       HCG (If Applicable): No results found for: PREGTESTUR, PREGSERUM, HCG, HCGQUANT     ABGs: No results found for: PHART, PO2ART, EDG2OOT, KPN5IMU, BEART, A7QUGRGP     Type & Screen (If Applicable):  No results found for: LABABO, LABRH    Drug/Infectious Status (If Applicable):  No results found for: HIV, HEPCAB    COVID-19 Screening (If Applicable):   Lab Results   Component Value Date    COVID19 Not Detected 03/03/2022           Anesthesia Evaluation  Patient summary reviewed and Nursing notes reviewed no history of anesthetic complications:   Airway: Mallampati: III  TM distance: >3 FB   Neck ROM: full  Mouth opening: > = 3 FB Dental:          Pulmonary:Negative Pulmonary ROS breath sounds clear to auscultation            Patient did not smoke on day of surgery. Cardiovascular:  Exercise tolerance: good (>4 METS),   (+) hypertension:, valvular problems/murmurs (severe AS bicuspid valve): AS, CAD: obstructive, hyperlipidemia      ECG reviewed  Rhythm: regular  Rate: normal  Echocardiogram reviewed                  Neuro/Psych:   Negative Neuro/Psych ROS              GI/Hepatic/Renal: Neg GI/Hepatic/Renal ROS            Endo/Other: Negative Endo/Other ROS   (+) no malignancy/cancer. (-) no malignancy/cancer               Abdominal:             Vascular: negative vascular ROS. Other Findings:             Anesthesia Plan      general     ASA 4       Induction: intravenous.   arterial line, central line, BIS, CVP and DUKE  MIPS: Postoperative opioids intended and Prophylactic antiemetics administered. Anesthetic plan and risks discussed with patient. Use of blood products discussed with patient whom consented to blood products. Plan discussed with attending. Jaswinder Dougherty RN   3/7/2022    EKG 3/3/22  Normal sinus rhythm  Possible Left atrial enlargement  Borderline ECG  No previous ECGs available  Confirmed by Lele Richards ([id number]) on 3/3/2022 3:10:20 PM    12/17/21 ECHO  Findings      Left Ventricle   Left ventricular size is grossly normal.   Moderate left ventricular concentric hypertrophy noted. Ejection fraction is visually estimated at 60%. No regional wall motion abnormalities seen. There is doppler evidence of stage I diastolic dysfunction. Right Ventricle   Normal right ventricular size and function. Left Atrium   Normal sized left atrium. Right Atrium   Normal right atrium size. Mitral Valve   Mild mitral regurgitation is present. Tricuspid Valve   Physiologic and/or trace tricuspid regurgitation. RVSP is 32 mmHg. Aortic Valve   There is severe fibrocalcific sclerosis of the aortic valve with evidence   of severe aortic stenosis with mean gradient across the aortic valve of   approximately 50 mmHg peak gradient of 84 mmHg and calculated aortic valve   area 0.5 square centimeters all suggesting severe/critical aortic   stenosis. Pulmonic Valve   Not well visualized. Normal Doppler evaluation. Pericardial Effusion   No evidence of pericardial effusion. Aorta   Aortic root dimension within normal limits. Miscellaneous   Normal Inferior Vena Cava diameter and respiratory variation.       Conclusions      Summary   There is severe fibrocalcific sclerosis of the aortic valve with evidence   of severe aortic stenosis with mean gradient across the aortic valve of   approximately 50 mmHg peak gradient of 84 mmHg and calculated aortic valve   area 0.5 square centimeters all suggesting severe/critical aortic   stenosis. 2/4/22 CARDIAC CATH  CONCLUSIONS:  1.  Heavily calcified aortic valve (with known severe aortic stenosis on  echocardiogram). 2.  Multivessel coronary artery disease as described above. 2/2/22 US CAROTID ARTERY  Atherosclerotic disease. No hemodynamically significant stenosis is   identified   Estimated stenosis by NASCET criteria in the proximal right carotid   artery is between 0% and 49%. Estimated stenosis by NASCET criteria in the proximal left carotid   artery is between 0% and 49%. CT CHEST WO CONTRAST 3/3/22  Impression   There is consolidative scarring posteriorly within the upper lung fields with   multiple fibronodular densities seen diffusely throughout the central aspect   of the lung fields.  Differential includes old healed granulomatous disease   with extensive calcified lymph nodes throughout the mediastinum and hilar   regions or possibly sarcoidosis.  An acute infectious process is also of   concern however less favored and clinical correlation is needed.       Mild aneurysmal dilatation at 4 cm of the ascending thoracic aorta with   extensive calcifications seen throughout the aortic valve with coronary   artery calcification present. 6/5/20 US THYROID  Impression   Solitary nodule in the left midpole thyroid gland which measures up to   4 mm in size and is predominantly solid.  Given its size, no further   follow-up is necessary.       TI-RADS 3:  Probably benign nodules (< 5% risk of malignancy)      3/3/22 VL ANKLE ART BRACHIAL INDEX  Normal ankle arm index with good arterial flow to both feet based upon   the pulse volume recordings over the metatarsal

## 2022-03-07 NOTE — PROGRESS NOTES
Admitted to Same Day Surgery. Preop instructions given to patient. COVID testing completed on: 3/3/22  Results of the test: not detected  The patient verbally confirms that they did adhere to the self-quarantine guidelines. No signs or symptoms expressed or observed.

## 2022-03-07 NOTE — PROGRESS NOTES
Patient was extubated to 5 liters/min via nasal cannula. Breath Sounds post extubation were clear. Stridor was not present post extubation. SPO2 was 95%.       Performed by  Speedy Nascimento RCP

## 2022-03-07 NOTE — PROGRESS NOTES
Spontaneous Parameters performed    VT = 614 ml  f = 13  B/M  Ve = 8.37 L/M  NIF = -21/-26  cmH2O  VC = 1.067 L  RSBI = 21      Performed by Ellie Carbajal RCP

## 2022-03-07 NOTE — BRIEF OP NOTE
Brief Postoperative Note    Kristopher Sabillon  YOB: 1955  [id number]    Pre-operative Diagnosis: AS, CAD    Post-operative Diagnosis: Same    Operation: Sternotomy/Aortic valve replacement with 25 mm Pittman Inspiris bioprosthetic/Mitral valve repair by decalcification of the anterior leaflet of the mitral valve/CABG x 3 (LIMA-LAD, SVG-Diag, SVG-RCA)/GREGORIA exclusion with 35 mM AtriClip/EVH LLE/Rigid internal fixation of the sternum with Ardara plates x 2    Anesthesia: General    Surgeon: Gregg Horton    Assistants: Aldo/Jj/Perez/Abelino    Estimated Blood Loss: 2857    Complications: None    Specimens:   ID Type Source Tests Collected by Time Destination   A : Calcification anterior leaf of mitral valve Tissue Tissue SURGICAL PATHOLOGY Frank Trinidad MD 3/7/2022 8375    B : AORTIC LEAFLET Tissue Tissue SURGICAL PATHOLOGY Frank Trinidad MD 3/7/2022 0938        Implants:  Implant Name Type Inv.  Item Serial No.  Lot No. LRB No. Used Action   DEVICE OCCL CLP L35MM PLUNG GRP FLX SHFT FOR GILLINOV - [id number]  DEVICE OCCL CLP L35MM PLUNG GRP FLX SHFT FOR GILLINOV  [employer]WD [id number] N/A 1 Implanted   VALVE AORT 25MM REPL RESILIENT BOV PERICARD CO CHROMIUM ALLY - [id number] Aortic valves VALVE AORT 25MM REPL RESILIENT BOV PERICARD CO CHROMIUM ALLY [id number] [employer] MIMI[employer]WD  N/A 1 Implanted   DEVICE FIXATION LADDER PLATE NARROW 14 HOLES - [id number]  DEVICE FIXATION LADDER PLATE NARROW 14 HOLES  ZELALEM MIMI-WD  N/A 1 Implanted   PLATE FIXATION BOX STERNAL 4 HOLES - [id number]  PLATE FIXATION BOX STERNAL 4 HOLES  ZELALEM MIMI-WD  N/A 1 Implanted   SCREW LOCKING SD 2.3X16MM 5P - [id number]  SCREW LOCKING SD 2.3X16MM 5P  ZELALEM MIMI-WD  N/A 16 Implanted         Drains:   Chest Tube 1 Anterior Mediastinal 32 [place] citizen (Active)       Chest Tube 2 Anterior Mediastinal 32 [place] citizen (Active)       Chest Tube 3 Anterior Pleural 28 [place] citizen (Active)       Chest Tube 4 Anterior Pleural 19 [place] (Active) NG/OG/N[place]/NE Tube Orogastric Center mouth (Active)       Urethral Catheter Temperature probe 16 fr (Active)       Findings: BAV with fusion right and left CC, see dictation    Electronically signed by Marine Hamm MD on 3/7/2022 at 10:54 AM

## 2022-03-07 NOTE — ANESTHESIA PROCEDURE NOTES
Arterial Line:    An arterial line was placed using surface landmarks, in the OR for the following indication(s): Jose Case A 20 gauge (size), 12 cm (length), Arrow (type) catheter was placed, Seldinger technique not used, into the right brachial artery, secured by Tegaderm. Anesthesia type: Local  Local infiltration: Injection    Events:  patient tolerated procedure well with no complications and EBL 0mL.   3/7/2022 6:45 AM3/7/2022 7:00 AM  Anesthesiologist: Ricky Veronica DO  Resident/CRNA: LAVELLE Ho CRNA  Performed: Resident/CRNA   Preanesthetic Checklist  Completed: patient identified, IV checked, site marked, risks and benefits discussed, surgical consent, monitors and equipment checked, pre-op evaluation, timeout performed, anesthesia consent given, oxygen available and patient being monitored

## 2022-03-07 NOTE — LETTER
[address] Medicaid  CERTIFICATION OF NECESSITY  FOR TRANSPORTATION   BY WHEELCHAIR VAN   Individual Information   1. Name: Iliana Cruz 2. [place] Medicaid Billing Number:    3. Address: [place] [address]    Transportation Provider Information   4. Provider Name:    5. [place] Medicaid Provider Number:  National Provider Identifier (NPI):    Certification  7. Criteria:  By signing this document, the practitioner certifies that two statements are true:  A. This individual must be accompanied by a mobility-related assistive device from the point of pick-up to the point of drop-off. B. Transport of this individual by standard passenger vehicle or common carrier is precluded or contraindicated. 8. Period Beginning Date: 03/09/22   9. Length  [x] Not more than 10 day(s)  [] One Year   Additional Information Relevant to Certification   10. Comments or Explanations, If Necessary or Appropriate   S/p cardiac surgery, weakness    Certifying Practitioner Information   11. Name of Practitioner: DR Patria Martínez    12. [place] Medicaid Provider Number, If Applicable:  [address] Provider Identifier (NPI):    Signature Information   14. Date of Signature: 03/09/22 15. Name of Person Signing: Electronically signed by Stuart Rodriguez RN on 3/9/2022 at 11:44 AM    16.  Signature and Professional Designation: Electronically signed by Stuart Rodriguez RN on 3/9/2022 at 11:45 AM      OD [id number]  Rev. 7/2015

## 2022-03-08 ENCOUNTER — APPOINTMENT (OUTPATIENT)
Dept: GENERAL RADIOLOGY | Age: 67
DRG: 219 | End: 2022-03-08
Attending: THORACIC SURGERY (CARDIOTHORACIC VASCULAR SURGERY)
Payer: COMMERCIAL

## 2022-03-08 LAB
ANION GAP SERPL CALCULATED.3IONS-SCNC: 6 MMOL/L (ref 7–16)
BUN BLDV-MCNC: 7 MG/DL (ref 6–23)
CALCIUM IONIZED: 1.17 MMOL/L (ref 1.15–1.33)
CALCIUM SERPL-MCNC: 8 MG/DL (ref 8.6–10.2)
CHLORIDE BLD-SCNC: 103 MMOL/L (ref 98–107)
CO2: 28 MMOL/L (ref 22–29)
CREAT SERPL-MCNC: 0.8 MG/DL (ref 0.7–1.2)
GFR AFRICAN AMERICAN: >60
GFR NON-AFRICAN AMERICAN: >60 ML/MIN/1.73
GLUCOSE BLD-MCNC: 153 MG/DL (ref 74–99)
HCT VFR BLD CALC: 32 % (ref 37–54)
HEMOGLOBIN: 10.9 G/DL (ref 12.5–16.5)
MAGNESIUM: 1.9 MG/DL (ref 1.6–2.6)
MAGNESIUM: 2.2 MG/DL (ref 1.6–2.6)
MCH RBC QN AUTO: 31.7 PG (ref 26–35)
MCHC RBC AUTO-ENTMCNC: 34.1 % (ref 32–34.5)
MCV RBC AUTO: 93 FL (ref 80–99.9)
METER GLUCOSE: 115 MG/DL (ref 74–99)
METER GLUCOSE: 116 MG/DL (ref 74–99)
METER GLUCOSE: 118 MG/DL (ref 74–99)
METER GLUCOSE: 137 MG/DL (ref 74–99)
METER GLUCOSE: 138 MG/DL (ref 74–99)
PDW BLD-RTO: 13 FL (ref 11.5–15)
PLATELET # BLD: 112 E9/L (ref 130–450)
PMV BLD AUTO: 10.9 FL (ref 7–12)
POTASSIUM SERPL-SCNC: 4.4 MMOL/L (ref 3.5–5)
RBC # BLD: 3.44 E12/L (ref 3.8–5.8)
SODIUM BLD-SCNC: 137 MMOL/L (ref 132–146)
WBC # BLD: 8 E9/L (ref 4.5–11.5)

## 2022-03-08 PROCEDURE — 97530 THERAPEUTIC ACTIVITIES: CPT

## 2022-03-08 PROCEDURE — 82330 ASSAY OF CALCIUM: CPT

## 2022-03-08 PROCEDURE — 99233 SBSQ HOSP IP/OBS HIGH 50: CPT | Performed by: NURSE PRACTITIONER

## 2022-03-08 PROCEDURE — 97162 PT EVAL MOD COMPLEX 30 MIN: CPT

## 2022-03-08 PROCEDURE — 83735 ASSAY OF MAGNESIUM: CPT

## 2022-03-08 PROCEDURE — 85027 COMPLETE CBC AUTOMATED: CPT

## 2022-03-08 PROCEDURE — 6370000000 HC RX 637 (ALT 250 FOR IP): Performed by: NURSE PRACTITIONER

## 2022-03-08 PROCEDURE — 2580000003 HC RX 258: Performed by: NURSE PRACTITIONER

## 2022-03-08 PROCEDURE — 2140000000 HC CCU INTERMEDIATE R&B

## 2022-03-08 PROCEDURE — 94640 AIRWAY INHALATION TREATMENT: CPT

## 2022-03-08 PROCEDURE — 2700000000 HC OXYGEN THERAPY PER DAY

## 2022-03-08 PROCEDURE — 6360000002 HC RX W HCPCS: Performed by: NURSE PRACTITIONER

## 2022-03-08 PROCEDURE — 80048 BASIC METABOLIC PNL TOTAL CA: CPT

## 2022-03-08 PROCEDURE — 82962 GLUCOSE BLOOD TEST: CPT

## 2022-03-08 PROCEDURE — 97166 OT EVAL MOD COMPLEX 45 MIN: CPT

## 2022-03-08 PROCEDURE — 36415 COLL VENOUS BLD VENIPUNCTURE: CPT

## 2022-03-08 PROCEDURE — 99232 SBSQ HOSP IP/OBS MODERATE 35: CPT | Performed by: INTERNAL MEDICINE

## 2022-03-08 PROCEDURE — 71045 X-RAY EXAM CHEST 1 VIEW: CPT

## 2022-03-08 PROCEDURE — S5553 INSULIN LONG ACTING 5 U: HCPCS | Performed by: NURSE PRACTITIONER

## 2022-03-08 RX ORDER — ACETAMINOPHEN 500 MG
1000 TABLET ORAL EVERY 8 HOURS SCHEDULED
Status: COMPLETED | OUTPATIENT
Start: 2022-03-08 | End: 2022-03-09

## 2022-03-08 RX ORDER — POTASSIUM CHLORIDE 20 MEQ/1
20 TABLET, EXTENDED RELEASE ORAL PRN
Status: DISCONTINUED | OUTPATIENT
Start: 2022-03-08 | End: 2022-03-11 | Stop reason: HOSPADM

## 2022-03-08 RX ORDER — FOLIC ACID 1 MG/1
1 TABLET ORAL DAILY
Status: DISCONTINUED | OUTPATIENT
Start: 2022-03-09 | End: 2022-03-11 | Stop reason: HOSPADM

## 2022-03-08 RX ORDER — ASPIRIN 81 MG/1
81 TABLET ORAL DAILY
Status: DISCONTINUED | OUTPATIENT
Start: 2022-03-09 | End: 2022-03-11 | Stop reason: HOSPADM

## 2022-03-08 RX ORDER — MAGNESIUM SULFATE IN WATER 40 MG/ML
2000 INJECTION, SOLUTION INTRAVENOUS PRN
Status: DISCONTINUED | OUTPATIENT
Start: 2022-03-08 | End: 2022-03-11 | Stop reason: HOSPADM

## 2022-03-08 RX ORDER — DEXTROSE MONOHYDRATE 25 G/50ML
12.5 INJECTION, SOLUTION INTRAVENOUS PRN
Status: DISCONTINUED | OUTPATIENT
Start: 2022-03-08 | End: 2022-03-11 | Stop reason: HOSPADM

## 2022-03-08 RX ORDER — BISACODYL 10 MG
10 SUPPOSITORY, RECTAL RECTAL DAILY PRN
Status: DISCONTINUED | OUTPATIENT
Start: 2022-03-08 | End: 2022-03-11 | Stop reason: HOSPADM

## 2022-03-08 RX ORDER — NICOTINE POLACRILEX 4 MG
15 LOZENGE BUCCAL PRN
Status: DISCONTINUED | OUTPATIENT
Start: 2022-03-08 | End: 2022-03-11 | Stop reason: HOSPADM

## 2022-03-08 RX ORDER — FERROUS SULFATE 325(65) MG
325 TABLET ORAL 2 TIMES DAILY WITH MEALS
Status: DISCONTINUED | OUTPATIENT
Start: 2022-03-09 | End: 2022-03-11 | Stop reason: HOSPADM

## 2022-03-08 RX ORDER — MORPHINE SULFATE 2 MG/ML
2 INJECTION, SOLUTION INTRAMUSCULAR; INTRAVENOUS EVERY 4 HOURS PRN
Status: DISCONTINUED | OUTPATIENT
Start: 2022-03-08 | End: 2022-03-11 | Stop reason: HOSPADM

## 2022-03-08 RX ORDER — LOSARTAN POTASSIUM 50 MG/1
25 TABLET ORAL DAILY
Status: DISCONTINUED | OUTPATIENT
Start: 2022-03-08 | End: 2022-03-08

## 2022-03-08 RX ORDER — DIPHENHYDRAMINE HCL 25 MG
25 TABLET ORAL EVERY 8 HOURS PRN
Status: DISCONTINUED | OUTPATIENT
Start: 2022-03-08 | End: 2022-03-11 | Stop reason: HOSPADM

## 2022-03-08 RX ORDER — ASCORBIC ACID 500 MG
500 TABLET ORAL 2 TIMES DAILY
Status: DISCONTINUED | OUTPATIENT
Start: 2022-03-08 | End: 2022-03-11 | Stop reason: HOSPADM

## 2022-03-08 RX ORDER — AMIODARONE HYDROCHLORIDE 200 MG/1
400 TABLET ORAL PRN
Status: DISCONTINUED | OUTPATIENT
Start: 2022-03-08 | End: 2022-03-11 | Stop reason: HOSPADM

## 2022-03-08 RX ORDER — DEXTROSE MONOHYDRATE 50 MG/ML
100 INJECTION, SOLUTION INTRAVENOUS PRN
Status: DISCONTINUED | OUTPATIENT
Start: 2022-03-08 | End: 2022-03-11 | Stop reason: HOSPADM

## 2022-03-08 RX ORDER — SENNA AND DOCUSATE SODIUM 50; 8.6 MG/1; MG/1
1 TABLET, FILM COATED ORAL 2 TIMES DAILY
Status: DISCONTINUED | OUTPATIENT
Start: 2022-03-08 | End: 2022-03-11 | Stop reason: HOSPADM

## 2022-03-08 RX ADMIN — Medication 10 ML: at 20:02

## 2022-03-08 RX ADMIN — MAGNESIUM SULFATE HEPTAHYDRATE 2000 MG: 40 INJECTION, SOLUTION INTRAVENOUS at 10:25

## 2022-03-08 RX ADMIN — OXYCODONE 5 MG: 5 TABLET ORAL at 18:14

## 2022-03-08 RX ADMIN — OXYCODONE HYDROCHLORIDE 10 MG: 10 TABLET ORAL at 01:36

## 2022-03-08 RX ADMIN — ROSUVASTATIN 20 MG: 20 TABLET, FILM COATED ORAL at 08:46

## 2022-03-08 RX ADMIN — ASPIRIN 81 MG: 81 TABLET, COATED ORAL at 08:45

## 2022-03-08 RX ADMIN — OXYCODONE 5 MG: 5 TABLET ORAL at 05:46

## 2022-03-08 RX ADMIN — INSULIN GLARGINE-YFGN 12 UNITS: 100 INJECTION, SOLUTION SUBCUTANEOUS at 20:02

## 2022-03-08 RX ADMIN — METOPROLOL TARTRATE 25 MG: 25 TABLET, FILM COATED ORAL at 08:46

## 2022-03-08 RX ADMIN — ACETAMINOPHEN 1000 MG: 500 TABLET ORAL at 09:00

## 2022-03-08 RX ADMIN — TAMSULOSIN HYDROCHLORIDE 0.4 MG: 0.4 CAPSULE ORAL at 08:46

## 2022-03-08 RX ADMIN — MUPIROCIN: 20 OINTMENT TOPICAL at 20:02

## 2022-03-08 RX ADMIN — OXYCODONE 5 MG: 5 TABLET ORAL at 13:51

## 2022-03-08 RX ADMIN — ACETAMINOPHEN 1000 MG: 500 TABLET ORAL at 13:52

## 2022-03-08 RX ADMIN — DOCUSATE SODIUM 50 MG AND SENNOSIDES 8.6 MG 1 TABLET: 8.6; 5 TABLET, FILM COATED ORAL at 08:45

## 2022-03-08 RX ADMIN — ACETAMINOPHEN 1000 MG: 500 TABLET ORAL at 21:32

## 2022-03-08 RX ADMIN — BISACODYL 5 MG: 5 TABLET, COATED ORAL at 08:45

## 2022-03-08 RX ADMIN — IPRATROPIUM BROMIDE AND ALBUTEROL SULFATE 1 AMPULE: .5; 2.5 SOLUTION RESPIRATORY (INHALATION) at 12:34

## 2022-03-08 RX ADMIN — OXYCODONE 5 MG: 5 TABLET ORAL at 09:47

## 2022-03-08 RX ADMIN — IPRATROPIUM BROMIDE AND ALBUTEROL SULFATE 1 AMPULE: .5; 2.5 SOLUTION RESPIRATORY (INHALATION) at 16:51

## 2022-03-08 RX ADMIN — IPRATROPIUM BROMIDE AND ALBUTEROL SULFATE 1 AMPULE: .5; 2.5 SOLUTION RESPIRATORY (INHALATION) at 20:26

## 2022-03-08 RX ADMIN — Medication 2000 MG: at 06:58

## 2022-03-08 RX ADMIN — PANTOPRAZOLE SODIUM 40 MG: 40 TABLET, DELAYED RELEASE ORAL at 09:00

## 2022-03-08 RX ADMIN — Medication 10 ML: at 08:46

## 2022-03-08 RX ADMIN — METOPROLOL TARTRATE 25 MG: 25 TABLET, FILM COATED ORAL at 20:01

## 2022-03-08 RX ADMIN — MUPIROCIN: 20 OINTMENT TOPICAL at 08:50

## 2022-03-08 RX ADMIN — Medication 2000 MG: at 15:36

## 2022-03-08 RX ADMIN — Medication 400 MG: at 08:45

## 2022-03-08 ASSESSMENT — PAIN SCALES - GENERAL
PAINLEVEL_OUTOF10: 0
PAINLEVEL_OUTOF10: 5
PAINLEVEL_OUTOF10: 4
PAINLEVEL_OUTOF10: 0
PAINLEVEL_OUTOF10: 6
PAINLEVEL_OUTOF10: 0
PAINLEVEL_OUTOF10: 5
PAINLEVEL_OUTOF10: 6
PAINLEVEL_OUTOF10: 7
PAINLEVEL_OUTOF10: 5
PAINLEVEL_OUTOF10: 5
PAINLEVEL_OUTOF10: 6
PAINLEVEL_OUTOF10: 5
PAINLEVEL_OUTOF10: 5
PAINLEVEL_OUTOF10: 6
PAINLEVEL_OUTOF10: 5
PAINLEVEL_OUTOF10: 3
PAINLEVEL_OUTOF10: 5
PAINLEVEL_OUTOF10: 7
PAINLEVEL_OUTOF10: 0

## 2022-03-08 ASSESSMENT — PAIN DESCRIPTION - ONSET
ONSET: GRADUAL
ONSET: ON-GOING
ONSET: ON-GOING

## 2022-03-08 ASSESSMENT — PAIN DESCRIPTION - PAIN TYPE
TYPE: SURGICAL PAIN

## 2022-03-08 ASSESSMENT — PAIN DESCRIPTION - FREQUENCY
FREQUENCY: CONTINUOUS
FREQUENCY: CONTINUOUS
FREQUENCY: INTERMITTENT

## 2022-03-08 ASSESSMENT — PAIN DESCRIPTION - DESCRIPTORS
DESCRIPTORS: ACHING;CONSTANT;DISCOMFORT

## 2022-03-08 ASSESSMENT — PAIN DESCRIPTION - PROGRESSION
CLINICAL_PROGRESSION: NOT CHANGED
CLINICAL_PROGRESSION: GRADUALLY WORSENING
CLINICAL_PROGRESSION: NOT CHANGED

## 2022-03-08 ASSESSMENT — PAIN DESCRIPTION - LOCATION
LOCATION: CHEST;STERNUM

## 2022-03-08 ASSESSMENT — PAIN DESCRIPTION - ORIENTATION
ORIENTATION: MID;UPPER

## 2022-03-08 ASSESSMENT — PAIN SCALES - WONG BAKER: WONGBAKER_NUMERICALRESPONSE: 0

## 2022-03-08 ASSESSMENT — PAIN - FUNCTIONAL ASSESSMENT
PAIN_FUNCTIONAL_ASSESSMENT: PREVENTS OR INTERFERES SOME ACTIVE ACTIVITIES AND ADLS

## 2022-03-08 NOTE — PLAN OF CARE
Problem: Falls - Risk of:  Goal: Will remain free from falls  Description: Will remain free from falls  Outcome: Met This Shift  Goal: Absence of physical injury  Description: Absence of physical injury  Outcome: Met This Shift     Problem:  Activity Intolerance:  Goal: Ability to tolerate increased activity will improve  Description: Ability to tolerate increased activity will improve  3/7/2022 2206 by Mireya Horn RN  Outcome: Met This Shift  3/7/2022 0829 by Fanta Queen RN  Outcome: Ongoing     Problem: Anxiety:  Goal: Level of anxiety will decrease  Description: Level of anxiety will decrease  Outcome: Met This Shift     Problem: Cardiac Output - Decreased:  Goal: Cardiac output within specified parameters  Description: Cardiac output within specified parameters  3/7/2022 2206 by Mireya Horn RN  Outcome: Met This Shift  3/7/2022 0829 by Fanta Queen RN  Outcome: Ongoing  Goal: Hemodynamic stability will improve  Description: Hemodynamic stability will improve  3/7/2022 2206 by Mireya Horn RN  Outcome: Met This Shift  3/7/2022 0829 by Fanta Queen RN  Outcome: Ongoing     Problem: Fluid Volume - Imbalance:  Goal: Ability to achieve a balanced intake and output will improve  Description: Ability to achieve a balanced intake and output will improve  3/7/2022 2206 by Mireya Horn RN  Outcome: Met This Shift  3/7/2022 0829 by Fanta Queen RN  Outcome: Ongoing  Goal: Chest tube drainage is within specified parameters  Description: Chest tube drainage is within specified parameters  3/7/2022 2206 by Mireya Horn RN  Outcome: Met This Shift  3/7/2022 0829 by Fanta Queen RN  Outcome: Ongoing     Problem: Gas Exchange - Impaired:  Goal: Levels of oxygenation will improve  Description: Levels of oxygenation will improve  3/7/2022 2206 by Mireya Horn RN  Outcome: Met This Shift  3/7/2022 0829 by Fanta Queen RN  Outcome: Ongoing  Goal: Ability to maintain adequate ventilation will improve  Description: Ability to maintain adequate ventilation will improve  3/7/2022 2206 by Mireya Horn RN  Outcome: Met This Shift  3/7/2022 0829 by Fanta Queen RN  Outcome: Ongoing     Problem: Pain:  Goal: Pain level will decrease  Description: Pain level will decrease  Outcome: Met This Shift  Goal: Control of acute pain  Description: Control of acute pain  Outcome: Met This Shift  Goal: Control of chronic pain  Description: Control of chronic pain  Outcome: Met This Shift     Problem: Tissue Perfusion - Cardiopulmonary, Altered:  Goal: Absence of angina  Description: Absence of angina  Outcome: Met This Shift  Goal: Hemodynamic stability will improve  Description: Hemodynamic stability will improve  Outcome: Met This Shift  Goal: Will show no evidence of cardiac arrhythmias  Description: Will show no evidence of cardiac arrhythmias  Outcome: Met This Shift     Problem: Pain:  Goal: Pain level will decrease  Description: Pain level will decrease  Outcome: Met This Shift  Goal: Control of acute pain  Description: Control of acute pain  Outcome: Met This Shift  Goal: Control of chronic pain  Description: Control of chronic pain  Outcome: Met This Shift

## 2022-03-08 NOTE — PROGRESS NOTES
CVICU Progress Note    Name: Benjamín Tavares  MRN: [id number]    CC: Postoperative Critical Care Management      Indication for Surgery/Procedure: AS, CAD  LVEF:  60%   RVF:  normal     Important/Relevant PMH/PSH: Plaque psoriasis, on Humira- follows with Rheumatology, current smoker      Procedure/Surgeries: 3/7/2022 Sternotomy/Aortic valve replacement with 25 mm Pittman Inspiris bioprosthetic/Mitral valve repair by decalcification of the anterior leaflet of the mitral valve/CABG x 3 (LIMA-LAD, SVG-Diag, SVG-RCA)/GREGORIA exclusion with 35 mM AtriClip/EVH LLE/Rigid internal fixation of the sternum with [place] plates x 2     Pacing wires: Ventricular        Intake/Output Summary (Last 24 hours) at 3/8/2022 0807  Last data filed at 3/8/2022 0700  Gross per 24 hour   Intake 3282.9 ml   Output 3490 ml   Net -207.1 ml       Recent Labs     03/07/22  1027 03/07/22  1122 03/08/22  0500   WBC  --  9.4 8.0   HGB  --  11.3* 10.9*   HCT 23.0* 33.3* 32.0*   PLT  --  103* 112*      Lab Results   Component Value Date     03/08/2022    K 4.4 03/08/2022     03/08/2022    CO2 28 03/08/2022    BUN 7 03/08/2022    CREATININE 0.8 03/08/2022    GLUCOSE 153 03/08/2022    CALCIUM 8.0 03/08/2022         Physical Exam:    /74   Pulse 83   Temp 98.9 °F (37.2 °C) (Oral)   Resp 16   Ht 5' 10\" (1.778 m)   Wt 170 lb 9.6 oz (77.4 kg)   SpO2 98%   BMI 24.48 kg/m²       CXR Findings:     FINDINGS:   Central venous catheter and chest tubes are unchanged.  There is been   endotracheal and nasogastric extubation.  Bilateral airspace disease is   unchanged.  No visible pneumothorax.         - CXR personally viewed and interpreted by ICU Nurse Practitioner, agree with above findings     General: Awake, alert. C/o expected incisional pain   Eyes: PERRL, anicteric   Pulmonary: Diminished bibasilar.  No wheezes, no accessory muscle use noted   Cardiovascular:  RRR, no heaves or thrills on palpation  Tele: SR  Abdomen: Soft, nontender, hypoactive BS   Extremities: Palpable pulses all extremities, no edema   Neurologic/Psych: A&Ox3, ANDRES to command   Skin: Warm and dry  Incisions: MSI YAZMIN intact, LLE SVG sites with ace wrap, left groin C/D/I       Assessment/Plan: POD #1    1. AS, CAD S/p Aortic valve replacement with 25 mm Pittman Inspiris bioprosthetic, Mitral valve repair by decalcification of the anterior leaflet of the mitral valve, CABG x 3 (LIMA-LAD, SVG-Diag, SVG-RCA),GREGORIA exclusion with 35 mM AtriClip  - ASA, Crestor, metoprolol   - Perioperative Ancef  - MS chest tubes removed without difficulty, pleural drains placed to bulb suction. Pt tolerated well.      2. Acute Pulmonary Insufficiency Following Surgery    - Extubated DOS without difficulty   - Tolerating 4L O2 NC, sats 98%   -Continue EZPAP q 4 hours, encourage IS, instructed to C&DB, OOBTC with PT/OT, increase activity as tolerated, wean O2 as able for sats >92%     3. Acute Post Operative Pain   - Pain control suboptimal, add tylenol ATC, encourage PO PRN narcotics for optimal relief     4.  Postoperative HTN  - On SNP gtt to maintain SBP <140, start metoprolol 25mg BID, wean gtt off       VTE Prophylaxis: Pharmacologic/Mechanical: Yes, SCDs   Line infection prevention: Can CVC or arterial line be removed: yes   Continued need for urinary catheter: no, remove prior to transfer     Dispo: Transfer to [facility] once off SNP gtt     Electronically signed by LAVELLE Ch CNP on 3/8/2022 at 8:07 AM

## 2022-03-08 NOTE — PROGRESS NOTES
Physical Therapy  Physical Therapy Initial Assessment     Name: Rufus Liang  : [id number]  MRN: [id number]      Date of Service: 3/8/2022    Evaluating PT:  Hemant Plummer, PT, DPT [id number]    Room #:  4993/5491-K  Diagnosis:  CAD in native artery [I25.10]  PMHx/PSHx:  HLD  Procedure/Surgery:  3/7 AVR, MV repair, CABG x 3  Precautions:  Falls, Sternal, O2  Equipment Needs:  TBD    SUBJECTIVE:    Pt lives alone in a 1 story home with 2+1 stairs to enter and 1 rail. Pt ambulated without device and was independent PTA. OBJECTIVE:   Initial Evaluation  Date: 3/8/22 Treatment Short Term/ Long Term   Goals   AM-PAC 6 Clicks      Was pt agreeable to Eval/treatment? Yes     Does pt have pain?  7/10 surgical pain     Bed Mobility  Rolling: NT  Supine to sit: MaxA with HOB elevated  Sit to supine: NT  Scooting: MaxA  Fadi   Transfers Sit to stand: Fadi  Stand to sit: ModA  Stand pivot: Fadi no device  Independent   Ambulation   A few steps to chair with Fadi no device  >400 feet Independently   Stair negotiation: ascended and descended NT  >4 steps with 1 rail Mod Independent   ROM BUE:  Defer to OT note  BLE:  WFL     Strength BUE:  Defer to OT note  BLE:  4/5 grossly  Increase by 1/3 MMT grade   Balance Sitting EOB:  Fadi dynamic  Dynamic Standing:  Fadi no device  Sitting EOB:  Independent  Dynamic Standing:  Independent     Pt is A & O x 4  CAM-ICU: NT  RASS: 0  Sensation:  No reported paresthesias  Edema:  None    Vitals:  Heart Rate at rest 82 bpm Heart Rate post session 76 bpm   SpO2 at rest 98% SpO2 post session 97%   Blood Pressure at rest 139/65 mmHg Blood Pressure post session 139/64 mmHg       Functional Status Score-Intensive Care Unit (FSS-ICU)   Rolling -/   Supine to sit transfer 2   Unsupported sitting     Sit to stand transfers    Ambulation    Total       Therapeutic Exercises:  NA    Patient education  Pt educated on safety    Patient response to education:   Pt verbalized understanding Pt demonstrated skill Pt requires further education in this area   x x x     ASSESSMENT:    Conditions Requiring Skilled Therapeutic Intervention:    [x]Decreased strength     []Decreased ROM  [x]Decreased functional mobility  [x]Decreased balance   [x]Decreased endurance   []Decreased posture  []Decreased sensation  []Decreased coordination   []Decreased vision  []Decreased safety awareness   [x]Increased pain       Comments:  NP reported pt was medically stable. Pt was in bed upon arrival, agreeable to initial evaluation. Pt was educated on sternal precautions and PLB prior to activity. Increased assistance provided for bed mobility due to deconditioning and precautions. Increased pain reported with all mobility. Pt stood with slight unsteadiness and completed shuffled steps to chair. Fatigue limited further activity. Pt was left in chair with all needs met and call light in reach. All lines remained intact. Treatment:  Patient practiced and was instructed in the following treatment:     Bed mobility training - pt given verbal and tactile cues to facilitate proper sequencing and safety during supine>sit as well as provided with physical assistance.  Sitting EOB for >5 minutes for upright tolerance, postural awareness and BLE ROM   Transfer training - pt was given verbal and tactile cues to facilitate proper hand placement, technique and safety during sit to stand and stand to sit as well as provided with physical assistance.  Gait training- pt was given verbal and tactile cues to facilitate safety and balance during ambulation as well as provided with physical assistance. Pt's/ family goals   1. Return ghome    Prognosis is good for reaching above PT goals. Patient and or family understand(s) diagnosis, prognosis, and plan of care.   yes    PHYSICAL THERAPY PLAN OF CARE:    PT POC is established based on physician order and patient diagnosis     Referring provider/PT Order: Cesar Montiel, LAVELLE - CNP/03/08/22 0600 PT eval and treat  Diagnosis:  CAD in native artery [I25.10]  Specific instructions for next treatment:  Progress activity    Current Treatment Recommendations:     [x] Strengthening to improve independence with functional mobility   [] ROM to improve independence with functional mobility   [x] Balance Training to improve static/dynamic balance and to reduce fall risk  [x] Endurance Training to improve activity tolerance during functional mobility   [x] Transfer Training to improve safety and independence with all functional transfers   [x] Gait Training to improve gait mechanics, endurance and asses need for appropriate assistive device  [x] Stair Training in preparation for safe discharge home and/or into the community   [] Positioning to prevent skin breakdown and contractures  [x] Safety and Education Training   [x] Patient/Caregiver Education   [] HEP  [] Other     PT long term treatment goals are located in above grid    Frequency of treatments: 2-5x/week x 1-2 weeks. Time in  0850  Time out  0915    Total Treatment Time  10 minutes     Evaluation Time includes thorough review of current medical information, gathering information on past medical history/social history and prior level of function, completion of standardized testing/informal observation of tasks, assessment of data and education on plan of care and goals.     CPT codes:  [] Low Complexity PT evaluation 52067  [x] Moderate Complexity PT evaluation 02291  [] High Complexity PT evaluation 78308  [] PT Re-evaluation 08026  [] Gait training 22271 - minutes  [] Manual therapy 56292 - minutes  [x] Therapeutic activities 39835 10 minutes  [] Therapeutic exercises 79281 - minutes  [] Neuromuscular reeducation 66414 -- minutes     Katy Padilla, PT, DPT  [id number]

## 2022-03-08 NOTE — CARE COORDINATION
Pod#1 s/p Avr,Mv repair, cabg x3. o2 currently 4L. Nipride drip weaned off this am.  Bp 130/60. Met with pt. He lives alone in a 1 story home. There are 2 sm steps then 2 more lg steps to enter from front, no rail. From back there are 3 steps to deck with rail and 1 step to enter. Informed him of need to be able to ambulate 400 ft prior to discharge and recommendation for 24/7 care for at least the 1st week. He states he has friends who will assist him at home, but not sure if he will have someone 24/7. Discussed [facility] vs [facility]hc. Provided him with [place] and [facility]hc list to review. He would like to see how he does in the next few days and speak with friends re: help at home. Informed him cm/sw will follow up with him on the 6th floor. WDL

## 2022-03-08 NOTE — PROGRESS NOTES
PROGRESS NOTE     CARDIOLOGY    Chief complaint: Seen today for follow up, management & recommendations for coronary artery disease, aortic stenosis, status post CABG, AVR, and mitral valve repair. He denies chest pain or shortness of breath today. He states that it hurts when he breathes. Wt Readings from Last 3 Encounters:   03/07/22 170 lb 9.6 oz (77.4 kg)   03/03/22 170 lb 9.6 oz (77.4 kg)   02/22/22 165 lb (74.8 kg)     Temp Readings from Last 3 Encounters:   03/08/22 100.4 °F (38 °C) (Bladder)   03/07/22 94.8 °F (34.9 °C)   03/03/22 98.3 °F (36.8 °C) (Oral)     BP Readings from Last 3 Encounters:   03/07/22 125/74   03/03/22 134/69   02/22/22 (!) 143/93     Pulse Readings from Last 3 Encounters:   03/08/22 84   03/03/22 63   02/22/22 70         Intake/Output Summary (Last 24 hours) at 3/8/2022 0856  Last data filed at 3/8/2022 0700  Gross per 24 hour   Intake 3282.9 ml   Output 3490 ml   Net -207.1 ml       Recent Labs     03/07/22  1027 03/07/22  1122 03/08/22  0500   WBC  --  9.4 8.0   HGB  --  11.3* 10.9*   HCT 23.0* 33.3* 32.0*   MCV  --  90.7 93.0   PLT  --  103* 112*     Recent Labs     03/07/22  1027 03/07/22  1027 03/07/22  1122 03/07/22  1305 03/07/22  1525 03/07/22  2230 03/08/22  0500   NA  --   --  139  --   --   --  137   K 4.2   < > 4.2   < > 4.4 4.6 4.4   CL  --   --  106  --   --   --  103   CO2  --   --  24  --   --   --  28   BUN  --   --  6  --   --   --  7   CREATININE 0.9  --  0.8  --   --   --  0.8   MG  --   --  2.4  --   --   --  1.9    < > = values in this interval not displayed. Recent Labs     03/07/22  1122   PROTIME 16.6*   INR 1.5     No results for input(s): CKTOTAL, CKMB, CKMBINDEX, TROPONINI in the last 72 hours. No results for input(s): BNP in the last 72 hours. No results for input(s): CHOL, HDL, TRIG in the last 72 hours. Invalid input(s): CHOLHDLR, LDLCALCU  No results for input(s): TROPHS in the last 72 hours.       metoprolol tartrate (LOPRESSOR) tablet 25 mg, BID  acetaminophen (TYLENOL) tablet 1,000 mg, 3 times per day  rosuvastatin (CRESTOR) tablet 20 mg, Daily  0.9 % sodium chloride infusion, Continuous  sodium chloride flush 0.9 % injection 10 mL, 2 times per day  sodium chloride flush 0.9 % injection 10 mL, PRN  0.9 % sodium chloride infusion, PRN  ondansetron (ZOFRAN-ODT) disintegrating tablet 4 mg, Q8H PRN   Or  ondansetron (ZOFRAN) injection 4 mg, Q6H PRN  aspirin EC tablet 81 mg, Daily  oxyCODONE (ROXICODONE) immediate release tablet 5 mg, Q4H PRN   Or  oxyCODONE HCl (OXY-IR) immediate release tablet 10 mg, Q4H PRN  fentaNYL (SUBLIMAZE) injection 25 mcg, Q1H PRN   Or  fentaNYL (SUBLIMAZE) injection 50 mcg, Q1H PRN  chlorhexidine (PERIDEX) 0.12 % solution 15 mL, BID  magnesium oxide (MAG-OX) tablet 400 mg, Daily  mupirocin (BACTROBAN) 2 % ointment, BID  propofol injection, Continuous PRN  pantoprazole (PROTONIX) tablet 40 mg, Daily  ceFAZolin (ANCEF) 2000 mg in sterile water 20 mL IV syringe, Q8H  potassium chloride 20 mEq/50 mL IVPB (Central Line), PRN  magnesium sulfate 2000 mg in 50 mL IVPB premix, PRN  calcium chloride 1,000 mg in sodium chloride 0.9 % 100 mL IVPB, PRN   Or  calcium chloride 2,000 mg in sodium chloride 0.9 % 100 mL IVPB, PRN  ipratropium-albuterol (DUONEB) nebulizer solution 1 ampule, Q4H WA  albumin human 5 % IV solution 25 g, PRN  0.9 % sodium chloride bolus, Continuous PRN  insulin regular (HUMULIN R;NOVOLIN R) 100 Units in sodium chloride 0.9 % 100 mL infusion, Continuous  insulin glargine-yfgn (SEMGLEE-YFGN) injection vial 12 Units, Nightly  glucose (GLUTOSE) 40 % oral gel 15 g, PRN  dextrose 50 % IV solution, PRN  glucagon (rDNA) injection 1 mg, PRN  dextrose 5 % solution, PRN  sennosides-docusate sodium (SENOKOT-S) 8.6-50 MG tablet 1 tablet, BID  bisacodyl (DULCOLAX) suppository 10 mg, Daily PRN  bisacodyl (DULCOLAX) EC tablet 5 mg, Daily  insulin lispro (HUMALOG) injection vial 0-18 Units, Q4H  tamsulosin (FLOMAX) capsule 0.4 mg, Daily  nitroPRUSSide (NIPRIDE) 50 mg in dextrose 5 % 250 mL infusion, Continuous PRN  albumin human 5 % IV solution 12.5 g, Once        Review of systems:     Heart: as above   Lungs: as above   Eyes: denies changes in vision or discharge. Ears: denies changes in hearing or pain. Nose: denies epistaxis or masses   Throat: denies sore throat or trouble swallowing. Neuro: denies numbness, tingling, tremors. Skin: denies rashes or itching. : denies hematuria, dysuria   GI: denies vomiting, diarrhea   Psych: denies mood changed, anxiety, depression. Physical exam:    Constitutional: A&O x3, communicates well, no acute distress. Eyes: extraocular muscles intact, PERRL. Normal lids & conjunctiva. No icterus. ENT: clear, no bleeding. No external masses. Lips normal formation. Neck: supple, full ROM, no JVD, no bruits, no lymphadenopathy. No masses. trachea midline. Heart: Distant. Regular rate & rhythm, normal S1 & S2.  2/6 systolic murmur at the apex. No heave. Lungs: Poor effort due to pain. Right-sided rales. No accessory muscles. Abd: soft, non-tender. Normal bowel sounds. Neuro: Full ROM X 4, EOMI, no tremors. EXT: Wrapped. No bilateral lower extremity edema  Skin: warm, dry, intact. Good turgor. Psych: A&O x 3, normal behavior, not anxious. Assessment/Recommendations  1. Postop day 1 bioprosthetic AVR. 2. Postop day 1 CABG x3.  3. Postop day 1 decalcification of the mitral valve leaflet. 4. Postop day 1 left atrial appendage clipping. 5. He is now requiring a night pride drip due to hypertension. I will add metoprolol and losartan today. 6. Psoriasis  7. Hypercholesterolemia    Note: This report was completed using computerized voice recognition software. Every effort has been made to ensure accuracy, however; and invert and computerized transcription errors may be present.

## 2022-03-08 NOTE — PROGRESS NOTES
[address], [address] [place] [address]                                                               Patient Name: Wilma Henry  MRN: [id number]  : 1955  Room: [address] [place] [address]    Evaluating OT: Alejandro Win OTR/L [id number]    Referring Provider: LAVELLE Manzanares CNP   Specific Provider Orders/Date: OT eval and treat (3/7/22)      Diagnosis: Multivessel CAD, Severe Aortic Stenosis     Surgery/Procedures: 3/7 AVR, MVR, CABG X 3     Pertinent Medical History: Aortic stenosis, CAD, HLD     *Precautions:  Fall Risk, sternal, 5L O2, SBP<140    Assessment of current deficits   [x]Functional mobility  [x]ADLs [x]Strength  []Cognition  [x]Functional transfers  [x]IADLs [x]Safety Awareness  [x]Endurance  []Fine Motor Coordination  [x]Balance       []Vision/perception  []Sensation    []Gross Motor Coordination [x]ROM  []Delirium                  [] Motor Control     []Communication     OT PLAN OF CARE   OT POC based on physician orders, patient diagnosis and results of clinical assessment.        Frequency/Duration: 1-3 days/wk for 1-2 weeks PRN     Specific OT Treatment to include:   ADL retraining/adapted techniques and AE recommendations to increase functional independence within precautions                    Energy conservation techniques to improve tolerance for selfcare routine   Functional transfer/mobility training/DME recommendations for increased independence, safety and fall prevention         Patient/family education to increase safety and functional independence within precautions             Environmental modifications for safe mobility and completion of ADLs                             Therapeutic activity to improve functional performance during ADLs                                         Therapeutic exercise to improve tolerance and functional strength for ADLs Balance retraining exercises/tasks for facilitation of postural control with dynamic challenges during ADLs . Recommended Adaptive Equipment:  LB dressing AE pending progress, shower seat as needed for energy conservation. Home Living: Pt lives alone  in a 1 floor plan with 2+1 step(s) to enter and 1 rail(s); bed/bath on first floor  Bathroom setup: tub/shower; standard height commode seat  Equipment owned: no DME    Prior Level of Function: IND with ADLs;  IND with IADLs. No device for ambulation. Driving: yes  Occupation: works    Pain Level: pt c/o 7/10 chest pain  this session    Cognition: A&O: 4/4    Follows 1-2 step commands appropriately   Memory: Good   Comprehension Good   Problem solving: Fair+/Good   Judgement/safety: Fair+/Good               Communication skills: WFL           Vision: WFL               Glasses:reading                                                   Hearing: WFL     RASS: 0  CAM-ICU: (NT) Delirium     UE Assessment:  Hand Dominance: Right [x]  Left []     ROM Strength STM goal: PRN   RUE  Grossly WFL within precautions Not formally tested; grossly WFL              WNL for ADLS     LUE Grossly WFL within precautions Not formally tested; grossly WFL              WNL for ADLS       Sensation: No c/o numbness or tingling in extremities   Tone: WNL   Edema: WLF     Functional Assessment: AM-PAC Daily Activity Raw Score: 14/24   Initial Eval Status  Date: 3/8 Treatment Status  Date: STG=LTG  Time Frame: 5-7days   Feeding S; set up                       IND  while seated up in chair to increase activity tolerance        Grooming Min A                       IND   while standing sink level demonstrating G tolerance; G balance.      UB dressing/bathing Mod A                       Betzy   demonstrating G knowledge of precautions during tasks     LB dressing/bathing Max A  seated  crossing LE's for reach  *Pt introduced to AE  (may benefit from use of LH sponge for bathing) Betzy  using AE as needed for safe reach/ energy conservation       Toileting NT                       Betzy     Bed Mobility  Supine to sit: Max A    Sit to supine:   NT                       Min A  in prep of ADL tasks & transfers   Functional Transfers Sit to stand: Min A  from higher bed surface;    NT from lower chair surface due to decreased tolerance    Stand to sit: Min A                       Betzy  sit<>stand/functional bathroom transfers using AD/DME as needed for balance and safety   Functional Mobility Min A  no device                        Betzy   functional/bathroom mobility using AD as needed & demonstrating G safety     Balance Sitting:     Static:  S    Dynamic:Min A  Standing: Min A  IND dynamic sitting balance; Betzy dynamic standing balance  during ADL tasks & transfers   Endurance/Activity Tolerance   F tolerance with light activity. G   tolerance with moderate activity/self care routine   Visual/  Perceptual               WFL                          Vitals:   HR at rest: 83 bpm HR at end of session: 77 bpm   Spo2 at rest:97% Spo2 at end of session 97%   BP at rest:143/70 mmHg BP at end of session 139/64 mmHg       Treatment: OT intervention provided this date includes:  Functional mobility: Instruction on sternal precautions to facilitate safe bed mobility & functional transfers. Pt required 2 person assist for safe mobility due to complexity of medical condition, medical lines and deconditioning. HOB elevated to assist. Pt required min cues to maintain precautions. ADL retraining: Instruction on adapted dressing techniques/equipment (reacher, LH sponge) to maintain sternal precautions during ADLs. Pt verbalizes G understanding. Decreased tolerance this am to practice using AE. Energy Conservation training: Education on postural awareness/positioning and breathing techniques to improve overall tolerance and participation in self care routine.  Pt demonstrated fair tolerance. Review of recommended DME for fall prevention, bathroom safety & energy conservation. Pt/Family Education: Instruction with handouts on energy conservation and sternal precautions during functional activities for safe return home. Pt/family demonstrates good understanding. Line management and environmental modifications made prior to and end of session to ensure patient safety and to increase efficiency of session. Skilled monitoring of HR, O2 saturation, blood pressure and patient's response to activity performed throughout session. Comments: OK from RN to see patient. Upon arrival, patient supine in bed, agreeable to session. At end of session, patient left seated in chair to increase activity tolerance. Call light within reach, all lines and tubes intact. Pt instructed on use of call light for assistance and fall prevention. Patient presents with decreased activity tolerance, dynamic balance, functional mobility and pain limiting completion of ADLs and safety. Pt can benefit from continued skilled OT to increase safety, functional independence and quality of life. Rehab Potential: good for established goals    Patient / Family Goal: return to PLOF    Patient and/or family were instructed/educated on diagnosis, prognosis/goals and plan of care. Pt demonstrated G understanding. [] Malnutrition indicators have been identified and nursing has been notified to ensure a dietitian consult is ordered. Evaluation Complexity: Moderate    · History: Expanded chart review of consults, imaging, and psychosocial history related to current functional performance. · Exam: 5+ performance deficits identified limiting functional independence and safe return home   · Assistance/Modification: Min/mod assistance or modifications required to perform tasks. May have comorbidities that affect occupational performance.     Time In:0900              Time Out: 0923       Total Treatment Time: 8          Min Units   OT Eval Low 59073     OT Eval Medium 12456 X    OT Eval High 06624     OT Re-Eval [id number]     Therapeutic Ex [id number]     Therapeutic Activities 91924 8 1   ADL/Self Care 87586     Orthotic Management 05613     Neuro Re-Ed 69224     Non-Billable Time       Evaluation time includes thorough review of current medical information, gathering information on past medical history/social history and prior level of function, completion of standardized testing/informal observation of tasks, assessment of data and development of POC/Goals.      Aidan Myles, OTR/L [id number]

## 2022-03-08 NOTE — PLAN OF CARE
Problem: Falls - Risk of:  Goal: Will remain free from falls  Description: Will remain free from falls  3/8/2022 1656 by Sonja Moreno RN  Outcome: Met This Shift  3/8/2022 1627 by Sonja Moreno RN  Outcome: Met This Shift  3/8/2022 0958 by Sonja Moreno RN  Outcome: Met This Shift  Goal: Absence of physical injury  Description: Absence of physical injury  3/8/2022 1656 by Sonja Moreno RN  Outcome: Met This Shift  3/8/2022 1627 by Sonja Moreno RN  Outcome: Met This Shift  3/8/2022 0958 by Sonja Moreno RN  Outcome: Met This Shift     Problem: Discharge Planning:  Goal: Discharged to appropriate level of care  Description: Discharged to appropriate level of care  3/8/2022 1656 by Sonja Moreno RN  Outcome: Met This Shift  3/8/2022 1627 by Sonja Moreno RN  Outcome: Met This Shift     Problem:  Activity Intolerance:  Goal: Able to perform prescribed physical activity  Description: Able to perform prescribed physical activity  3/8/2022 1656 by Sonja Moreno RN  Outcome: Met This Shift  3/8/2022 1627 by Sonja Moreno RN  Outcome: Met This Shift  3/8/2022 0958 by Sonja Moreno RN  Outcome: Met This Shift  Goal: Ability to tolerate increased activity will improve  Description: Ability to tolerate increased activity will improve  3/8/2022 1656 by Sonja Moreno RN  Outcome: Met This Shift  3/8/2022 1627 by Sonja Moreno RN  Outcome: Met This Shift  3/8/2022 0958 by Sonja Moreno RN  Outcome: Met This Shift     Problem: Anxiety:  Goal: Level of anxiety will decrease  Description: Level of anxiety will decrease  3/8/2022 1656 by Sonja Moreno RN  Outcome: Met This Shift  3/8/2022 1627 by Sonja Moreno RN  Outcome: Met This Shift  3/8/2022 0958 by Sonja Moreno RN  Outcome: Met This Shift     Problem: Cardiac Output - Decreased:  Goal: Cardiac output within specified parameters  Description: Cardiac output within specified parameters  3/8/2022 1656 by Sonja Moreno RN  Outcome: Met This Shift  3/8/2022 1627 by Sanya Bell RN  Outcome: Met This Shift  3/8/2022 0958 by Sanya Bell RN  Outcome: Met This Shift  Goal: Hemodynamic stability will improve  Description: Hemodynamic stability will improve  3/8/2022 1656 by Sanya Bell RN  Outcome: Met This Shift  3/8/2022 1627 by Sanya Bell RN  Outcome: Met This Shift  3/8/2022 0958 by Sanya Bell RN  Outcome: Met This Shift     Problem: Fluid Volume - Imbalance:  Goal: Ability to achieve a balanced intake and output will improve  Description: Ability to achieve a balanced intake and output will improve  3/8/2022 1656 by Sanya Bell RN  Outcome: Met This Shift  3/8/2022 1627 by Sanya Bell RN  Outcome: Met This Shift  3/8/2022 0958 by Sanya Bell RN  Outcome: Met This Shift  Goal: Chest tube drainage is within specified parameters  Description: Chest tube drainage is within specified parameters  3/8/2022 1656 by Sanya Bell RN  Outcome: Met This Shift  3/8/2022 1627 by Sanya Bell RN  Outcome: Met This Shift  3/8/2022 0958 by Sanya Bell RN  Outcome: Met This Shift     Problem: Gas Exchange - Impaired:  Goal: Levels of oxygenation will improve  Description: Levels of oxygenation will improve  3/8/2022 1656 by Sanya Bell RN  Outcome: Met This Shift  3/8/2022 1627 by Sanya Bell RN  Outcome: Met This Shift  3/8/2022 0958 by Sanya Bell RN  Outcome: Met This Shift  Goal: Ability to maintain adequate ventilation will improve  Description: Ability to maintain adequate ventilation will improve  3/8/2022 1656 by Sanya Bell RN  Outcome: Met This Shift  3/8/2022 1627 by Sanya Bell RN  Outcome: Met This Shift  3/8/2022 0958 by Sanya Bell RN  Outcome: Met This Shift     Problem: Pain:  Goal: Pain level will decrease  Description: Pain level will decrease  3/8/2022 1656 by Sanya Bell RN  Outcome: Met This Shift  3/8/2022 1627 by Sanya Bell RN  Outcome: Met This Shift  3/8/2022 0958 by Sanya Bell RN  Outcome: Met This Shift  Goal: Control of acute pain  Description: Control of acute pain  3/8/2022 1656 by Mary Quiroz RN  Outcome: Met This Shift  3/8/2022 1627 by Mary Quiroz RN  Outcome: Met This Shift  3/8/2022 0958 by Mary Quiroz RN  Outcome: Met This Shift     Problem: Tissue Perfusion - Cardiopulmonary, Altered:  Goal: Hemodynamic stability will improve  Description: Hemodynamic stability will improve  3/8/2022 1656 by Mary Quiroz RN  Outcome: Met This Shift  3/8/2022 1627 by Mary Quiroz RN  Outcome: Met This Shift  Goal: Will show no evidence of cardiac arrhythmias  Description: Will show no evidence of cardiac arrhythmias  3/8/2022 1656 by Mary Quiroz RN  Outcome: Met This Shift  3/8/2022 1627 by Mary Quiroz RN  Outcome: Met This Shift     Problem: Pain:  Goal: Pain level will decrease  Description: Pain level will decrease  3/8/2022 1656 by Mary Quiroz RN  Outcome: Met This Shift  3/8/2022 1627 by Mary Quiroz RN  Outcome: Met This Shift  3/8/2022 0958 by Mary Quiroz RN  Outcome: Met This Shift  Goal: Control of acute pain  Description: Control of acute pain  3/8/2022 1656 by Mary Quiroz RN  Outcome: Met This Shift  3/8/2022 1627 by Mary Quiroz RN  Outcome: Met This Shift  3/8/2022 0958 by Mary Quiroz RN  Outcome: Met This Shift

## 2022-03-09 ENCOUNTER — APPOINTMENT (OUTPATIENT)
Dept: GENERAL RADIOLOGY | Age: 67
DRG: 219 | End: 2022-03-09
Attending: THORACIC SURGERY (CARDIOTHORACIC VASCULAR SURGERY)
Payer: COMMERCIAL

## 2022-03-09 LAB
ANION GAP SERPL CALCULATED.3IONS-SCNC: 8 MMOL/L (ref 7–16)
BUN BLDV-MCNC: 9 MG/DL (ref 6–23)
CALCIUM SERPL-MCNC: 8.6 MG/DL (ref 8.6–10.2)
CHLORIDE BLD-SCNC: 98 MMOL/L (ref 98–107)
CO2: 27 MMOL/L (ref 22–29)
CREAT SERPL-MCNC: 0.7 MG/DL (ref 0.7–1.2)
GFR AFRICAN AMERICAN: >60
GFR NON-AFRICAN AMERICAN: >60 ML/MIN/1.73
GLUCOSE BLD-MCNC: 116 MG/DL (ref 74–99)
HCT VFR BLD CALC: 32.9 % (ref 37–54)
HEMOGLOBIN: 11.2 G/DL (ref 12.5–16.5)
MAGNESIUM: 2.1 MG/DL (ref 1.6–2.6)
MCH RBC QN AUTO: 31.5 PG (ref 26–35)
MCHC RBC AUTO-ENTMCNC: 34 % (ref 32–34.5)
MCV RBC AUTO: 92.4 FL (ref 80–99.9)
METER GLUCOSE: 101 MG/DL (ref 74–99)
METER GLUCOSE: 119 MG/DL (ref 74–99)
METER GLUCOSE: 122 MG/DL (ref 74–99)
METER GLUCOSE: 136 MG/DL (ref 74–99)
METER GLUCOSE: 187 MG/DL (ref 74–99)
PDW BLD-RTO: 12.5 FL (ref 11.5–15)
PLATELET # BLD: 98 E9/L (ref 130–450)
PLATELET CONFIRMATION: NORMAL
PMV BLD AUTO: 11.2 FL (ref 7–12)
POTASSIUM SERPL-SCNC: 4 MMOL/L (ref 3.5–5)
POTASSIUM SERPL-SCNC: 4.1 MMOL/L (ref 3.5–5)
RBC # BLD: 3.56 E12/L (ref 3.8–5.8)
SODIUM BLD-SCNC: 133 MMOL/L (ref 132–146)
WBC # BLD: 10.2 E9/L (ref 4.5–11.5)

## 2022-03-09 PROCEDURE — 2700000000 HC OXYGEN THERAPY PER DAY

## 2022-03-09 PROCEDURE — 83735 ASSAY OF MAGNESIUM: CPT

## 2022-03-09 PROCEDURE — 2580000003 HC RX 258: Performed by: NURSE PRACTITIONER

## 2022-03-09 PROCEDURE — 51798 US URINE CAPACITY MEASURE: CPT

## 2022-03-09 PROCEDURE — 51701 INSERT BLADDER CATHETER: CPT

## 2022-03-09 PROCEDURE — 82962 GLUCOSE BLOOD TEST: CPT

## 2022-03-09 PROCEDURE — 36415 COLL VENOUS BLD VENIPUNCTURE: CPT

## 2022-03-09 PROCEDURE — 6360000002 HC RX W HCPCS: Performed by: NURSE PRACTITIONER

## 2022-03-09 PROCEDURE — 6370000000 HC RX 637 (ALT 250 FOR IP): Performed by: NURSE PRACTITIONER

## 2022-03-09 PROCEDURE — 6360000002 HC RX W HCPCS: Performed by: PHYSICIAN ASSISTANT

## 2022-03-09 PROCEDURE — 84132 ASSAY OF SERUM POTASSIUM: CPT

## 2022-03-09 PROCEDURE — 94640 AIRWAY INHALATION TREATMENT: CPT

## 2022-03-09 PROCEDURE — 71045 X-RAY EXAM CHEST 1 VIEW: CPT

## 2022-03-09 PROCEDURE — 85027 COMPLETE CBC AUTOMATED: CPT

## 2022-03-09 PROCEDURE — 2140000000 HC CCU INTERMEDIATE R&B

## 2022-03-09 PROCEDURE — 93798 PHYS/QHP OP CAR RHAB W/ECG: CPT

## 2022-03-09 PROCEDURE — 80048 BASIC METABOLIC PNL TOTAL CA: CPT

## 2022-03-09 RX ADMIN — Medication 400 MG: at 09:28

## 2022-03-09 RX ADMIN — IPRATROPIUM BROMIDE AND ALBUTEROL SULFATE 1 AMPULE: .5; 2.5 SOLUTION RESPIRATORY (INHALATION) at 20:12

## 2022-03-09 RX ADMIN — ASPIRIN 81 MG: 81 TABLET, COATED ORAL at 09:28

## 2022-03-09 RX ADMIN — MUPIROCIN: 20 OINTMENT TOPICAL at 09:34

## 2022-03-09 RX ADMIN — MUPIROCIN: 20 OINTMENT TOPICAL at 20:31

## 2022-03-09 RX ADMIN — ENOXAPARIN SODIUM 40 MG: 100 INJECTION SUBCUTANEOUS at 09:28

## 2022-03-09 RX ADMIN — SENNOSIDES AND DOCUSATE SODIUM 1 TABLET: 50; 8.6 TABLET ORAL at 00:16

## 2022-03-09 RX ADMIN — Medication 10 ML: at 09:28

## 2022-03-09 RX ADMIN — ACETAMINOPHEN 1000 MG: 500 TABLET ORAL at 06:39

## 2022-03-09 RX ADMIN — OXYCODONE HYDROCHLORIDE AND ACETAMINOPHEN 500 MG: 500 TABLET ORAL at 00:16

## 2022-03-09 RX ADMIN — METOPROLOL TARTRATE 25 MG: 25 TABLET, FILM COATED ORAL at 20:31

## 2022-03-09 RX ADMIN — FERROUS SULFATE TAB 325 MG (65 MG ELEMENTAL FE) 325 MG: 325 (65 FE) TAB at 09:28

## 2022-03-09 RX ADMIN — IPRATROPIUM BROMIDE AND ALBUTEROL SULFATE 1 AMPULE: .5; 2.5 SOLUTION RESPIRATORY (INHALATION) at 08:45

## 2022-03-09 RX ADMIN — OXYCODONE 5 MG: 5 TABLET ORAL at 09:40

## 2022-03-09 RX ADMIN — INSULIN LISPRO 1 UNITS: 100 INJECTION, SOLUTION INTRAVENOUS; SUBCUTANEOUS at 20:31

## 2022-03-09 RX ADMIN — Medication 2000 MG: at 00:16

## 2022-03-09 RX ADMIN — SENNOSIDES AND DOCUSATE SODIUM 1 TABLET: 50; 8.6 TABLET ORAL at 09:28

## 2022-03-09 RX ADMIN — OXYCODONE 5 MG: 5 TABLET ORAL at 17:04

## 2022-03-09 RX ADMIN — FERROUS SULFATE TAB 325 MG (65 MG ELEMENTAL FE) 325 MG: 325 (65 FE) TAB at 17:04

## 2022-03-09 RX ADMIN — FOLIC ACID 1 MG: 1 TABLET ORAL at 09:28

## 2022-03-09 RX ADMIN — BISACODYL 5 MG: 5 TABLET, COATED ORAL at 09:28

## 2022-03-09 RX ADMIN — TAMSULOSIN HYDROCHLORIDE 0.4 MG: 0.4 CAPSULE ORAL at 09:28

## 2022-03-09 RX ADMIN — PANTOPRAZOLE SODIUM 40 MG: 40 TABLET, DELAYED RELEASE ORAL at 09:29

## 2022-03-09 RX ADMIN — ROSUVASTATIN 20 MG: 20 TABLET, FILM COATED ORAL at 09:28

## 2022-03-09 RX ADMIN — SENNOSIDES AND DOCUSATE SODIUM 1 TABLET: 50; 8.6 TABLET ORAL at 20:31

## 2022-03-09 RX ADMIN — OXYCODONE HYDROCHLORIDE AND ACETAMINOPHEN 500 MG: 500 TABLET ORAL at 09:28

## 2022-03-09 RX ADMIN — METOPROLOL TARTRATE 25 MG: 25 TABLET, FILM COATED ORAL at 09:28

## 2022-03-09 RX ADMIN — IPRATROPIUM BROMIDE AND ALBUTEROL SULFATE 1 AMPULE: .5; 2.5 SOLUTION RESPIRATORY (INHALATION) at 16:21

## 2022-03-09 RX ADMIN — OXYCODONE HYDROCHLORIDE AND ACETAMINOPHEN 500 MG: 500 TABLET ORAL at 20:31

## 2022-03-09 ASSESSMENT — PAIN - FUNCTIONAL ASSESSMENT
PAIN_FUNCTIONAL_ASSESSMENT: ACTIVITIES ARE NOT PREVENTED
PAIN_FUNCTIONAL_ASSESSMENT: PREVENTS OR INTERFERES SOME ACTIVE ACTIVITIES AND ADLS

## 2022-03-09 ASSESSMENT — PAIN SCALES - GENERAL
PAINLEVEL_OUTOF10: 0
PAINLEVEL_OUTOF10: 4
PAINLEVEL_OUTOF10: 2
PAINLEVEL_OUTOF10: 5
PAINLEVEL_OUTOF10: 4
PAINLEVEL_OUTOF10: 5
PAINLEVEL_OUTOF10: 4

## 2022-03-09 ASSESSMENT — PAIN DESCRIPTION - ONSET
ONSET: ON-GOING
ONSET: ON-GOING

## 2022-03-09 ASSESSMENT — PAIN DESCRIPTION - PAIN TYPE
TYPE: SURGICAL PAIN
TYPE: SURGICAL PAIN

## 2022-03-09 ASSESSMENT — PAIN SCALES - WONG BAKER
WONGBAKER_NUMERICALRESPONSE: 0

## 2022-03-09 ASSESSMENT — PAIN DESCRIPTION - PROGRESSION
CLINICAL_PROGRESSION: NOT CHANGED
CLINICAL_PROGRESSION: NOT CHANGED

## 2022-03-09 ASSESSMENT — PAIN DESCRIPTION - LOCATION
LOCATION: CHEST;STERNUM
LOCATION: CHEST;STERNUM

## 2022-03-09 ASSESSMENT — PAIN DESCRIPTION - FREQUENCY
FREQUENCY: CONTINUOUS
FREQUENCY: CONTINUOUS

## 2022-03-09 ASSESSMENT — PAIN DESCRIPTION - DESCRIPTORS
DESCRIPTORS: ACHING;CONSTANT;DISCOMFORT
DESCRIPTORS: ACHING

## 2022-03-09 ASSESSMENT — PAIN DESCRIPTION - ORIENTATION
ORIENTATION: MID;UPPER
ORIENTATION: MID;UPPER

## 2022-03-09 NOTE — PROGRESS NOTES
pending today      3. NSR  --continue BB with hold parameters, uptitrate as needed          4. Expected acute pulmonary insufficiency in the setting following surgery  --wean oxygen to keep SpO2 greater than or equal to 92%  --continue duonebs with ezpap  --encourage C&DB, SMI  --currently on 2 L O2/NC        5. Constipation--expected delayed return of bowel function  --secondary to anesthesia, narcotics, decreased oral intake, and decreased physical activity   --no BM since prior to surgery  --Continue daily MOM/oral bisacodyl until +BM and senna-s as scheduled. --Will give daily suppository until +BM starting POD 3 if no result by then   --Encouraged continued increase in oral intake and activity.          6. Expected deconditioning in the setting following surgery  --Increase activity as tolerated  --PT/OT           DVT prophylaxis:  --continue bilateral knee high PAPA hose  --continue PCDs  --continue progressive ambulation  --Add lovenox for dvt prophylaxis and continue knee high PAPA hose/pcds/progressive ambulation      Dispo: home vs. Rehab pending progression in activity level        This patient's case and care plan was discussed with the attending surgeon

## 2022-03-09 NOTE — PROGRESS NOTES
Nutrition Education      Provided educational handouts and sample meal plans on heart healthy/cardiac diet. Patient sleeping soundly at this time. Information in discharge instructions. Will attempt to  patient later today. Recommend outpatient nutrition counseling for further education. · Written educational materials provided. · Contact name and number provided. · Refer to Patient Education activity for more details.     Electronically signed by Ruth Ann Montoya RD, [place] on 3/9/22 at 10:07 AM EST    Contact: [phone]

## 2022-03-09 NOTE — PLAN OF CARE
Problem: Falls - Risk of:  Goal: Will remain free from falls  Description: Will remain free from falls  3/9/2022 0155 by Ranulfo Millan RN  Outcome: Met This Shift  3/8/2022 1656 by James Gibbons RN  Outcome: Met This Shift  3/8/2022 1627 by James Gibbons RN  Outcome: Met This Shift     Problem: Falls - Risk of:  Goal: Absence of physical injury  Description: Absence of physical injury  3/9/2022 0155 by Ranulfo Millan RN  Outcome: Met This Shift     Problem: Pain:  Goal: Pain level will decrease  Description: Pain level will decrease  3/9/2022 0155 by Ranulfo Millan RN  Outcome: Met This Shift

## 2022-03-09 NOTE — DISCHARGE INSTR - DIET
Good nutrition is important when healing from an illness, injury, or surgery. Follow any nutrition recommendations given to you during your hospital stay. If you were given an oral nutrition supplement while in the hospital, continue to take this supplement at home. You can take it with meals, in-between meals, and/or before bedtime. These supplements can be purchased at most local grocery stores, pharmacies, and chain super-stores. If you have any questions about your diet or nutrition, call the hospital and ask for the dietitian. A heart-healthy diet is recommended to reduce your unhealthy blood cholesterol levels, manage high blood pressure, and lower your risk for heart disease. To follow a heart-healthy diet,    Eat a balanced diet with whole grains, fruits and vegetables, and lean protein sources. Achieve and maintain a healthy weight. Choose heart-healthy unsaturated fats. Limit saturated fats, trans fats, and cholesterol intake. Eat more plant-based or vegetarian meals using beans and soy foods for protein. Eat whole, unprocessed foods to limit the amount of sodium (salt) you eat. Limit refined carbohydrates especially sugar, sweets and sugar-sweetened beverages. If you drink alcohol, do so in moderation: one serving per day (women) and two servings per day (men). One serving is equivalent to 12 ounces beer, 5 ounces wine, or 1.5 ounces distilled spirits         Tips  Tips for Choosing Heart-Healthy Fats  Choose lean protein and low-fat dairy foods to reduce saturated fat intake. Saturated fat is usually found in animal-based protein and is associated with certain health risks. Saturated fat is the biggest contributor to raised low-density lipoprotein (LDL) cholesterol levels in the diet. Research shows that limiting saturated fat lowers unhealthy cholesterol levels. Eat no more than 7% of your total calories each day from saturated fat.  Ask your RDN to help you determine how much saturated fat is right for you. There are many foods that do not contain large amounts of saturated fats. Swapping these foods to replace foods high in saturated fats will help you limit the saturated fat you eat and improve your cholesterol levels. You can also try eating more plant-based or vegetarian meals. Instead of    Try:    Whole milk, cheese, yogurt, and ice cream    1%, ½%, or skim milk, low-fat cheese, non-fat yogurt, and low-fat ice cream    Fatty, marbled beef and pork    Lean beef, pork, or venison    Poultry with skin    Poultry without skin    Butter, stick margarine    Reduced-fat, whipped, or liquid spreads    Coconut oil, palm oil    Liquid vegetable oils: corn, canola, olive, soybean and safflower oils         Avoid trans fats. Trans fats increase levels of LDL-cholesterol. Hydrogenated fat in processed foods is the main source of trans fats in foods. Trans fats can be found in stick margarine, shortening, processed sweets, baked goods, some fried foods, and packaged foods made with hydrogenated oils. Avoid foods with partially hydrogenated oil on the ingredient list such as: cookies, pastries, baked goods, biscuits, crackers, microwave popcorn, and frozen dinners. Choose foods with heart healthy fats. Polyunsaturated and monounsaturated fat are unsaturated fats that may help lower your blood cholesterol level when used in place of saturated fat in your diet. Ask your RDN about taking a dietary supplement with plant sterols and stanols to help lower your cholesterol level. Research shows that substituting saturated fats with unsaturated fats is beneficial to cholesterol levels. Try these easy swaps:        Instead of    Try:    Butter, stick margarine, or solid shortening    Reduced-fat, whipped, or liquid spreads    Beef, pork, or poultry with skin         Fish and seafood    Chips, crackers, snack foods    Raw or unsalted nuts and seeds or nut butters    Hummus with this goal, ask your RDN about fiber laxatives. Choose fiber supplements made with viscous fibers such as psyllium seed husks or methylcellulose to help lower unhealthy cholesterol. Limit refined carbohydrates    There are three types of carbohydrates: starches, sugar, and fiber. Some carbohydrates occur naturally in food, like the starches in rice or corn or the sugars in fruits and milk. Refined carbohydrates--foods with high amounts of simple sugars--can raise triglyceride levels. High triglyceride levels are associated with coronary heart disease. Some examples of refined carbohydrate foods are table sugar, sweets, and beverages sweetened with added sugar. Tips for Reducing Sodium (Salt)  Although sodium is important for your body to function, too much sodium can be harmful for people with high blood pressure. As sodium and fluid buildup in your tissues and bloodstream, your blood pressure increases. High blood pressure may cause damage to other organs and increase your risk for a stroke. Even if you take a pill for blood pressure or a water pill (diuretic) to remove fluid, it is still important to have less salt in your diet. Ask your doctor and RDN what amount of sodium is right for you. Avoid processed foods. Eat more fresh foods. Fresh fruits and vegetables are naturally low in sodium, as well as frozen vegetables and fruits that have no added juices or sauces. Fresh meats are lower in sodium than processed meats, such as tracy, sausage, and hotdogs. Read the nutrition label or ask your  to help you find a fresh meat that is low in sodium. Eat less salt--at the table and when cooking. A single teaspoon of table salt has 2,300 mg of sodium. Leave the salt out of recipes for pasta, casseroles, and soups. Ask your RDN how to cook your favorite recipes without sodium  Be a smart . Look for food packages that say salt-free or sodium-free.  These items contain less than 5 milligrams of sodium per serving. Very low-sodium products contain less than 35 milligrams of sodium per serving. Low-sodium products contain less than 140 milligrams of sodium per serving. Beware of reduced salt or reduced sodium products. These items may still be high in sodium. Check the nutrition label. Add flavors to your food without adding sodium. Try lemon juice, lime juice, fruit juice or vinegar. Dry or fresh herbs add flavor. Try basil, bay leaf, dill, rosemary, parsley, reid, dry mustard, nutmeg, thyme, and paprika. Pepper, red pepper flakes, and cayenne pepper can add spice to your meals without adding sodium. Hot sauce contains sodium, but if you use just a drop or two, it will not add up to much. Buy a sodium-free seasoning blend or make your own at home. Additional Lifestyle Tips  Achieve and maintain a healthy weight. Talk with your RDN or your doctor about what is a healthy weight for you. Set goals to reach and maintain that weight. To lose weight, reduce your calorie intake along with increasing your physical activity. A weight loss of 10 to 15 pounds could reduce LDL-cholesterol by 5 milligrams per deciliter. Participate in physical activity. Talk with your health care team to find out what types of physical activity are best for you. Set a plan to get about 30 minutes of exercise on most days.   Foods Recommended  Food Group    Foods Recommended    Grains    Grain foods including whole grains: whole wheat, barley, rye, buckwheat, corn, teff, quinoa, millet, amaranth, brown or wild rice, sorghum, and oats    Processed whole grains such as pasta, rice, hot and cold cereals, and snacks that contain less than 300 mg sodium per serving    Whole grain bread, crackers, rolls, or chauncey with <07 mg sodium per slice (Note: yeast breads usually have less sodium than those made with baking soda),    Home-made bread made with reduced-sodium baking soda    Protein Foods    Fresh red meat: lean, trimmed cuts of beef, pork, or lamb    Fresh poultry: skinless chicken or turkey    Fresh seafood: fish (particularly fatty fish: salmon, herring), shrimp, lobster, clams, and scallops    Eggs (2-4 per week), eggwhites or egg substitute    Nuts and seeds (unsalted): peanuts, almonds, pistachios, and sunflower seeds, unsalted; peanut butter, almond butter, and sunflower seed butter, reduced sodium. Soy foods: tofu, tempeh, or soynuts    Meat alternatives: veggie burgers and sausages from plant protein without added sodium    Legumes: such as dried beans, lentils, or peas at least a few times per week in place of other protein sources, unsalted    Dairy    Skim, ½% or 1% milk, low-fat yogurt, low-sodium cheeses (Swiss cheese, ricotta cheese, and fresh mozzarella, low sodium cottage cheese)    Fortified soymilk, almond milk, rice milk, hemp milk    Frozen desserts (½ cup) made from low-fat milk    Vegetables    Fresh, frozen, and canned (unsalted) whole vegetables, including dark-green, red and orange vegetables, legumes (beans and peas), and starchy vegetables without added sauces, salt, or sodium; low-sodium vegetable juices    Fruits    Fresh, frozen, canned and dried whole unsweetened fruits canned fruit packed in water or fruit juice without added sugar; 100% fruit juice    Oils    Unsaturated vegetable oils: [place], avocado, canola, cashew, corn, grapeseed, olive, safflower, sesame, soybean, sunflower    Margarines and spreads which list liquid vegetable oil as the first ingredient and does not contain trans fats (partially hydrogenated oil)    Salad dressings made from oil and low in sodium (salt)    Avocado    Other    Prepared foods, including soups, casseroles, and salads made from recommended ingredients and contain <600 mg sodium. Homemade soups, casseroles, entrees, and side dishes typically contain less sodium that prepared alternatives.     Homemade soups and sauces such as gravy    Low-sodium crackers, chips, pretzels    Low-sodium seasonings (ketchup, BBQ)    Spices, herbs, Salt-free seasoning mixes and marinades    Vinegar    Lemon or lime juice    Foods Not Recommended  Food Group    Foods Not Recommended    Grains    Breakfast cereals, packaged baked goods, snack crackers, and prepared grains with more than 300 mg sodium per serving    Biscuits, cornbread, and other quick breads prepared with baking soda    Breads or crackers topped with salt    Bakery products, such as doughnuts, biscuits, croissants, British pastries, pies, cookies    Instant potatoes, noodles, rice, stuffing mix, or macaroni and cheese    Snacks made with partially hydrogenated oils, including chips, cheese puffs, snack mixes, regular crackers, butter-flavored popcorn    Prepackaged bread crumbs    Self-rising flours    Protein Foods    Meats high in saturated fat such as ribs, t-bone steak, regular 70/30 hamburger    Processed red meats, such as tracy, sausage, ham, pepperoni, hot dogs, corned beef, cured or smoked meats, canned meat, chili, barrett sausage, sardines, and spam with added sodium    Deli meats, such as pastrami, bologna, or salami (made of meat or poultry) with added sodium    Organ meat such as liver, gizzards, or sweetbread    Preseasoned and precooked meats    Poultry with skin or  processed poultry (chicken and turkey) with skin, breading, or high-sodium marinades or sauces    Whole eggs or egg yolks (greater than 5 per week)    [facility], poultry, or fish    Smoked fish and meats    Salted legumes, nuts, seeds, or nut/seed butters    Meat alternatives with high levels of sodium (>300 mg per serving) or saturated fat (>5 g per serving)    Dairy    Whole milk,?2% fat milk, or Buttermilk    Cream, half-&-half    Cream cheese, sour cream    Regular and processed cheese or sauces    Regular-sodium cottage cheese    Vegetables    Canned or frozen vegetables with salt, fresh vegetables prepared carrots  1 cup tossed salad  1 teaspoon olive oil and vinegar dressing  1 small whole-wheat roll  1 tsp margarine  1 cup tea  Evening Snack  1 banana  Daily Sum  Nutrient Unit Value  Macronutrients  Energy kcal 2069  Energy kJ 8655  Protein g 146  Total lipid (fat) g 69  Carbohydrate, by difference g 228  Fiber, total dietary g 33  Sugars, total g 85  Minerals  Calcium, Ca mg 1292  Iron, Fe mg 14  Sodium, Na mg 1751  Vitamins  Vitamin C, total ascorbic acid mg 85  Vitamin A, IU IU 07643  Vitamin D   Lipids  Fatty acids, total saturated g 12  Fatty acids, total monounsaturated g 27  Fatty acids, total polyunsaturated g 24  Cholesterol mg 270

## 2022-03-09 NOTE — PROGRESS NOTES
Comprehensive Nutrition Assessment    Type and Reason for Visit:  Initial,Consult,Patient Education    Nutrition Recommendations/Plan: Recommend and start Ensure High Protein supplement BID and Claus wound healing supplement BID to help meet increased nutritional needs from surgical wound healing. 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 ; hx of CAD and severe aortic stenosis ; hx of psoriasis ; will start ONS    Malnutrition Assessment:  Malnutrition Status: At risk for malnutrition (Comment)    Context:  Acute Illness     Findings of the 6 clinical characteristics of malnutrition:  Energy Intake:  Mild decrease in energy intake (Comment) (x 2 days since admission)  Weight Loss:  Unable to assess (d/t possible fluid shifts and/or weight discrepancies)     Body Fat Loss:  Unable to assess     Muscle Mass Loss:  Unable to assess    Fluid Accumulation:  No significant fluid accumulation     Strength:  Not Performed    Estimated Daily Nutrient Needs:  Energy (kcal):  9220-0817 (REE 1478 x 1.2 SF); Weight Used for Energy Requirements:  Current     Protein (g):   (1.2-1.4g/kg CBW); Weight Used for Protein Requirements:  Current        Fluid (ml/day):  9778-4350; Method Used for Fluid Requirements:  1 ml/kcal      Nutrition Related Findings:  I&Os WNL, 2+ edema, hypoactive BS, constipation, A&O x 4, chest tubes x 2      Wounds:  Multiple,Surgical Incision (Incisions x 2)       Current Nutrition Therapies:    ADULT DIET; Regular; 4 carb choices (60 gm/meal);  Low Fat/Low Chol/High Fiber/BERTRAM    Anthropometric Measures:  · Height: 5' 10\" (177.8 cm)  · Current Body Weight: 150 lb (68 kg) (3/9, bedscale)   · Admission Body Weight: 170 lb (77.1 kg) (3/7, stated)    · Usual Body Weight:  (EMR shows past weights of 170# standing scale on 3/3/22 and 165# no method on 8/26/21)     · Ideal Body Weight: 166 lbs; % Ideal Body Weight 90.4 %   · BMI: 21.5  · BMI Categories: Normal Weight (BMI 18.5-24. 9)       Nutrition Diagnosis:   · Increased nutrient needs related to increase demand for energy/nutrients as evidenced by wounds (surgical)      Nutrition Interventions:   Food and/or Nutrient Delivery:  Continue Current Diet,Start Oral Nutrition Supplement  Nutrition Education/Counseling:  Education completed   Coordination of Nutrition Care:  Continue to monitor while inpatient    Goals:  Patient will consume ~75% of meals served       Nutrition Monitoring and Evaluation:   Behavioral-Environmental Outcomes:  None Identified   Food/Nutrient Intake Outcomes:  Food and Nutrient Intake,Supplement Intake  Physical Signs/Symptoms Outcomes:  Biochemical Data,Chewing or Swallowing,Constipation,GI Status,Fluid Status or Edema,Hemodynamic Status,Meal Time Behavior,Nutrition Focused Physical Findings,Skin,Weight     Discharge Planning:     Too soon to determine     Electronically signed by Saleem Mccullough RD, LD on 3/9/22 at 10:06 AM EST    Contact: [phone]

## 2022-03-09 NOTE — FLOWSHEET NOTE
Patient to unit 65 with transport on tele pack, with 2L NC.  Patients belonging bag with cell phone, , and 2 pairs of glasses went in bed, as well as respiratory equipement and his muprion cream.

## 2022-03-09 NOTE — PROGRESS NOTES
Report called to PCCU RN for this patient's transfer to [phone]. Awaiting bed to be cleaned and ready at this time.

## 2022-03-09 NOTE — PLAN OF CARE
Problem: Discharge Planning:  Goal: Discharged to appropriate level of care  Description: Discharged to appropriate level of care  3/8/2022 2013 by Juan Hurtado RN  Outcome: Met This Shift  3/8/2022 1656 by Samson Cedeno RN  Outcome: Met This Shift  3/8/2022 1627 by Samson Cedeno RN  Outcome: Met This Shift  3/8/2022 0958 by Samson Cedeno RN  Outcome: Ongoing     Problem: Pain:  Goal: Control of acute pain  Description: Control of acute pain  3/8/2022 2013 by Juan Hurtado RN  Outcome: Met This Shift  3/8/2022 1656 by Samson Cedeno RN  Outcome: Met This Shift  3/8/2022 1627 by Samson Cedeno RN  Outcome: Met This Shift  3/8/2022 0958 by Samson Cedeno RN  Outcome: Met This Shift

## 2022-03-09 NOTE — PATIENT CARE CONFERENCE
[facility] Quality Flow/Interdisciplinary Rounds Progress Note        Quality Flow Rounds held on March 9, 2022    Disciplines Attending:  Bedside Nurse, ,  and Nursing Unit Leadership    Rufus Liang was admitted on 3/7/2022  5:24 AM    Anticipated Discharge Date:  Expected Discharge Date: 03/12/22    Disposition:    Aleksey Score:  Aleksey Scale Score: 19    Readmission Risk              Risk of Unplanned Readmission:  9           Discussed patient goal for the day, patient clinical progression, and barriers to discharge.   The following Goal(s) of the Day/Commitment(s) have been identified:  Diagnostics - Report Results and Labs - Report Results      Natalia Clayton RN  March 9, 2022

## 2022-03-10 LAB
ANION GAP SERPL CALCULATED.3IONS-SCNC: 9 MMOL/L (ref 7–16)
BUN BLDV-MCNC: 8 MG/DL (ref 6–23)
CALCIUM SERPL-MCNC: 8.6 MG/DL (ref 8.6–10.2)
CHLORIDE BLD-SCNC: 96 MMOL/L (ref 98–107)
CO2: 31 MMOL/L (ref 22–29)
CREAT SERPL-MCNC: 0.7 MG/DL (ref 0.7–1.2)
GFR AFRICAN AMERICAN: >60
GFR NON-AFRICAN AMERICAN: >60 ML/MIN/1.73
GLUCOSE BLD-MCNC: 109 MG/DL (ref 74–99)
HCT VFR BLD CALC: 35.3 % (ref 37–54)
HEMOGLOBIN: 12 G/DL (ref 12.5–16.5)
MAGNESIUM: 2.1 MG/DL (ref 1.6–2.6)
MCH RBC QN AUTO: 31.3 PG (ref 26–35)
MCHC RBC AUTO-ENTMCNC: 34 % (ref 32–34.5)
MCV RBC AUTO: 92.2 FL (ref 80–99.9)
METER GLUCOSE: 102 MG/DL (ref 74–99)
METER GLUCOSE: 120 MG/DL (ref 74–99)
METER GLUCOSE: 127 MG/DL (ref 74–99)
METER GLUCOSE: 138 MG/DL (ref 74–99)
METER GLUCOSE: 201 MG/DL (ref 74–99)
PDW BLD-RTO: 12.7 FL (ref 11.5–15)
PLATELET # BLD: 112 E9/L (ref 130–450)
PMV BLD AUTO: 11.1 FL (ref 7–12)
POTASSIUM SERPL-SCNC: 3.9 MMOL/L (ref 3.5–5)
RBC # BLD: 3.83 E12/L (ref 3.8–5.8)
SODIUM BLD-SCNC: 136 MMOL/L (ref 132–146)
WBC # BLD: 8.4 E9/L (ref 4.5–11.5)

## 2022-03-10 PROCEDURE — 82962 GLUCOSE BLOOD TEST: CPT

## 2022-03-10 PROCEDURE — 85027 COMPLETE CBC AUTOMATED: CPT

## 2022-03-10 PROCEDURE — 2580000003 HC RX 258: Performed by: NURSE PRACTITIONER

## 2022-03-10 PROCEDURE — 6370000000 HC RX 637 (ALT 250 FOR IP): Performed by: NURSE PRACTITIONER

## 2022-03-10 PROCEDURE — 97530 THERAPEUTIC ACTIVITIES: CPT

## 2022-03-10 PROCEDURE — 2140000000 HC CCU INTERMEDIATE R&B

## 2022-03-10 PROCEDURE — 93798 PHYS/QHP OP CAR RHAB W/ECG: CPT

## 2022-03-10 PROCEDURE — 94640 AIRWAY INHALATION TREATMENT: CPT

## 2022-03-10 PROCEDURE — 80048 BASIC METABOLIC PNL TOTAL CA: CPT

## 2022-03-10 PROCEDURE — 6360000002 HC RX W HCPCS: Performed by: PHYSICIAN ASSISTANT

## 2022-03-10 PROCEDURE — 97535 SELF CARE MNGMENT TRAINING: CPT

## 2022-03-10 PROCEDURE — 83735 ASSAY OF MAGNESIUM: CPT

## 2022-03-10 PROCEDURE — 36415 COLL VENOUS BLD VENIPUNCTURE: CPT

## 2022-03-10 RX ADMIN — IPRATROPIUM BROMIDE AND ALBUTEROL SULFATE 1 AMPULE: .5; 2.5 SOLUTION RESPIRATORY (INHALATION) at 13:33

## 2022-03-10 RX ADMIN — OXYCODONE HYDROCHLORIDE AND ACETAMINOPHEN 500 MG: 500 TABLET ORAL at 20:27

## 2022-03-10 RX ADMIN — POTASSIUM CHLORIDE 20 MEQ: 20 TABLET, EXTENDED RELEASE ORAL at 08:44

## 2022-03-10 RX ADMIN — OXYCODONE 5 MG: 5 TABLET ORAL at 05:36

## 2022-03-10 RX ADMIN — IPRATROPIUM BROMIDE AND ALBUTEROL SULFATE 1 AMPULE: .5; 2.5 SOLUTION RESPIRATORY (INHALATION) at 16:53

## 2022-03-10 RX ADMIN — Medication 10 ML: at 00:51

## 2022-03-10 RX ADMIN — Medication 400 MG: at 08:46

## 2022-03-10 RX ADMIN — MUPIROCIN: 20 OINTMENT TOPICAL at 08:46

## 2022-03-10 RX ADMIN — IPRATROPIUM BROMIDE AND ALBUTEROL SULFATE 1 AMPULE: .5; 2.5 SOLUTION RESPIRATORY (INHALATION) at 21:32

## 2022-03-10 RX ADMIN — SENNOSIDES AND DOCUSATE SODIUM 1 TABLET: 50; 8.6 TABLET ORAL at 08:45

## 2022-03-10 RX ADMIN — METOPROLOL TARTRATE 25 MG: 25 TABLET, FILM COATED ORAL at 08:45

## 2022-03-10 RX ADMIN — PANTOPRAZOLE SODIUM 40 MG: 40 TABLET, DELAYED RELEASE ORAL at 08:45

## 2022-03-10 RX ADMIN — ROSUVASTATIN 20 MG: 20 TABLET, FILM COATED ORAL at 08:45

## 2022-03-10 RX ADMIN — FERROUS SULFATE TAB 325 MG (65 MG ELEMENTAL FE) 325 MG: 325 (65 FE) TAB at 17:04

## 2022-03-10 RX ADMIN — Medication 10 ML: at 08:46

## 2022-03-10 RX ADMIN — ASPIRIN 81 MG: 81 TABLET, COATED ORAL at 08:45

## 2022-03-10 RX ADMIN — METOPROLOL TARTRATE 25 MG: 25 TABLET, FILM COATED ORAL at 20:27

## 2022-03-10 RX ADMIN — TAMSULOSIN HYDROCHLORIDE 0.4 MG: 0.4 CAPSULE ORAL at 08:45

## 2022-03-10 RX ADMIN — BISACODYL 5 MG: 5 TABLET, COATED ORAL at 08:45

## 2022-03-10 RX ADMIN — MUPIROCIN: 20 OINTMENT TOPICAL at 20:27

## 2022-03-10 RX ADMIN — OXYCODONE HYDROCHLORIDE AND ACETAMINOPHEN 500 MG: 500 TABLET ORAL at 08:45

## 2022-03-10 RX ADMIN — ENOXAPARIN SODIUM 40 MG: 100 INJECTION SUBCUTANEOUS at 08:46

## 2022-03-10 RX ADMIN — FERROUS SULFATE TAB 325 MG (65 MG ELEMENTAL FE) 325 MG: 325 (65 FE) TAB at 08:44

## 2022-03-10 RX ADMIN — FOLIC ACID 1 MG: 1 TABLET ORAL at 08:45

## 2022-03-10 RX ADMIN — Medication 10 ML: at 20:28

## 2022-03-10 ASSESSMENT — PAIN DESCRIPTION - PROGRESSION: CLINICAL_PROGRESSION: NOT CHANGED

## 2022-03-10 ASSESSMENT — PAIN DESCRIPTION - ORIENTATION: ORIENTATION: MID;LEFT

## 2022-03-10 ASSESSMENT — PAIN SCALES - WONG BAKER: WONGBAKER_NUMERICALRESPONSE: 0

## 2022-03-10 ASSESSMENT — PAIN SCALES - GENERAL
PAINLEVEL_OUTOF10: 0
PAINLEVEL_OUTOF10: 5
PAINLEVEL_OUTOF10: 0

## 2022-03-10 ASSESSMENT — PAIN DESCRIPTION - PAIN TYPE: TYPE: ACUTE PAIN;SURGICAL PAIN

## 2022-03-10 ASSESSMENT — PAIN DESCRIPTION - ONSET: ONSET: ON-GOING

## 2022-03-10 ASSESSMENT — PAIN DESCRIPTION - LOCATION: LOCATION: CHEST

## 2022-03-10 ASSESSMENT — PAIN DESCRIPTION - DESCRIPTORS: DESCRIPTORS: SORE;TENDER;DISCOMFORT

## 2022-03-10 ASSESSMENT — PAIN - FUNCTIONAL ASSESSMENT: PAIN_FUNCTIONAL_ASSESSMENT: PREVENTS OR INTERFERES SOME ACTIVE ACTIVITIES AND ADLS

## 2022-03-10 ASSESSMENT — PAIN DESCRIPTION - FREQUENCY: FREQUENCY: CONTINUOUS

## 2022-03-10 NOTE — PROGRESS NOTES
OCCUPATIONAL THERAPY treatment note    [facility] ([place] [address]  [address] [place] [address]     Date:3/10/2022                                                               Patient Name: Oracio Garibay  MRN: [id number]  : 1955  Room: [address] [place] [address]    Per eval:  Evaluating OT: Zaki Talley OTR/L [id number]    Referring Provider: LAVELLE Licea CNP   Specific Provider Orders/Date: OT eval and treat (3/7/22)    Diagnosis: Multivessel CAD, Severe Aortic Stenosis     Surgery/Procedures: 3/7 AVR, MVR, CABG X 3     Pertinent Medical History: Aortic stenosis, CAD, HLD     *Precautions:  Fall Risk, sternal, SBP<140    Assessment of current deficits   [x]Functional mobility  [x]ADLs [x]Strength  []Cognition  [x]Functional transfers  [x]IADLs [x]Safety Awareness  [x]Endurance  []Fine Motor Coordination  [x]Balance       []Vision/perception  []Sensation    []Gross Motor Coordination [x]ROM  []Delirium                  [] Motor Control     []Communication     OT PLAN OF CARE   OT POC based on physician orders, patient diagnosis and results of clinical assessment.        Frequency/Duration: 1-3 days/wk for 1-2 weeks PRN     Specific OT Treatment to include:   ADL retraining/adapted techniques and AE recommendations to increase functional independence within precautions                    Energy conservation techniques to improve tolerance for selfcare routine   Functional transfer/mobility training/DME recommendations for increased independence, safety and fall prevention         Patient/family education to increase safety and functional independence within precautions             Environmental modifications for safe mobility and completion of ADLs                             Therapeutic activity to improve functional performance during ADLs                                         Therapeutic exercise to improve tolerance and functional strength for ADLs Balance retraining exercises/tasks for facilitation of postural control with dynamic challenges during ADLs . Recommended Adaptive Equipment:  LB dressing AE pending progress, shower seat as needed for energy conservation. Home Living: Pt lives alone  in a 1 floor plan with 2+1 step(s) to enter and 1 rail(s); bed/bath on first floor  Bathroom setup: tub/shower; standard height commode seat  Equipment owned: no DME    Prior Level of Function: IND with ADLs;  IND with IADLs. No device for ambulation. Driving: yes  Occupation: works    Pain Level: pt c/o 7/10 chest pain  this session    Cognition: A&O: 4/4    Follows 1-2 step commands appropriately   Memory: Good   Comprehension Good   Problem solving: Fair+/Good   Judgement/safety: Fair+/Good               Communication skills: WFL           Vision: WFL               Glasses:reading                                                   Hearing: WFL     RASS: 0  CAM-ICU: (NT) Delirium     UE Assessment:  Hand Dominance: Right [x]  Left []     ROM Strength STM goal: PRN   RUE  Grossly WFL within precautions Not formally tested; grossly WFL              WNL for ADLS     LUE Grossly WFL within precautions Not formally tested; grossly WFL              WNL for ADLS       Sensation: No c/o numbness or tingling in extremities   Tone: WNL   Edema: WLF     Functional Assessment: AM-PAC Daily Activity Raw Score: 18/24   Initial Eval Status  Date: 3/8 Treatment Status  Date: 3/10 STG=LTG  Time Frame: 5-7days   Feeding S; set up IND (pt able to open packages and self feed)                      IND  while seated up in chair to increase activity tolerance        Grooming Min A Min A (standing at sink)                      IND   while standing sink level demonstrating G tolerance; G balance.      UB dressing/bathing Mod A UB dressing: Min A    UB bathing: Min A (simulated, pt declined)                      Betzy   demonstrating G knowledge of precautions during tasks     LB dressing/bathing Max A  seated  crossing LE's for reach  *Pt introduced to AE  (may benefit from use of LH sponge for bathing) LB dressing: Min A (pt educated on crossing of leg technique to doff/yarelis B socks)    LB bathing: Min A (simulated, pt declined)                      Betzy  using AE as needed for safe reach/ energy conservation       Toileting NT Mod A                      Betzy     Bed Mobility  Supine to sit: Max A    Sit to supine:   NT Min A                      Min A  in prep of ADL tasks & transfers   Functional Transfers Sit to stand: Min A  from higher bed surface;    NT from lower chair surface due to decreased tolerance    Stand to sit: Min A Min A                      Betzy  sit<>stand/functional bathroom transfers using AD/DME as needed for balance and safety   Functional Mobility Min A  no device Min A (using no AD, to/from bathroom)                       Betzy   functional/bathroom mobility using AD as needed & demonstrating G safety     Balance Sitting:     Static:  S    Dynamic:Min A  Standing: Min A Standing: Min A IND dynamic sitting balance; Betzy dynamic standing balance  during ADL tasks & transfers   Endurance/Activity Tolerance   F tolerance with light activity. fair G   tolerance with moderate activity/self care routine   Visual/  Perceptual               WFL                            Treatment: Cleared by RN to see pt. Pt required vc's and physical assist for proper technique/safety with hand placement/body mechanics/posture for bed mobility/ADLs/functional tranfers/mobility. Pt required vc's for sequencing/initiation of ADLs/functional transfers. Pt educated on adaptive technique for ease with LB dressing. Pt able to  sit EOB ~10 mins to increase core strength/balance/activity tolerance for ease with ADLs. Pt required increased time to complete ADLs/functional transfers due to rest breaks and pain. Pt re-educated on sternal precautions.   Pt appeared to have tolerated session well and appears motivated/cooperative/pleasant . Pt instructed on use of call light for assistance and fall prevention. Pt demo'ing fair understanding of education provided. Continue to educate. Comments: Upon arrival, patient supine in bed, agreeable to session. At end of session, patient left seated in chair to increase activity tolerance.       -pt has made fair progress toward goals  -continue current POC    Time In:  1035            Time Out:      1100  Total Treatment Time:     25      Min Units   OT Eval Low 35125     OT Eval Medium 24277     OT Eval High [id number]     OT Re-Eval [id number]     Therapeutic Ex [id number]     Therapeutic Activities 57831     ADL/Self Care [id number] 25 2   Orthotic Management 71719     Neuro Re-Ed 12256     Non-Billable Time       GISSELL Aparicio/SHANNON [id number]'

## 2022-03-10 NOTE — PATIENT CARE CONFERENCE
[facility] Quality Flow/Interdisciplinary Rounds Progress Note        Quality Flow Rounds held on March 10, 2022    Disciplines Attending:  Bedside Nurse, ,  and Nursing Unit Leadership    Kalli Estrada was admitted on 3/7/2022  5:24 AM    Anticipated Discharge Date:  Expected Discharge Date: 03/12/22    Disposition:    Aleksey Score:  Aleksey Scale Score: 19    Readmission Risk              Risk of Unplanned Readmission:  10           Discussed patient goal for the day, patient clinical progression, and barriers to discharge.   The following Goal(s) of the Day/Commitment(s) have been identified:  Activity Progression  400ft goal      Aditya Vera RN  March 10, 2022

## 2022-03-10 NOTE — PLAN OF CARE
Problem: Falls - Risk of:  Goal: Will remain free from falls  Description: Will remain free from falls  Outcome: Met This Shift     Problem: Cardiac Output - Decreased:  Goal: Cardiac output within specified parameters  Description: Cardiac output within specified parameters  Outcome: Met This Shift  Goal: Hemodynamic stability will improve  Description: Hemodynamic stability will improve  Outcome: Met This Shift

## 2022-03-10 NOTE — PROGRESS NOTES
Physical Therapy  Physical Therapy Treatment    Name: Arnel Don  :   MRN: [id number]      Date of Service: 3/10/2022    Evaluating PT:  Makenzie Rangel PT, DPT [id number]    Room #:  5270/0674-M  Diagnosis:  CAD in native artery [I25.10]  PMHx/PSHx:  HLD  Procedure/Surgery:  3/7 AVR, MV repair, CABG x 3  Precautions:  Falls, Sternal,   Equipment Needs:  TBD    SUBJECTIVE:    Pt lives alone in a 1 story home with 2+1 stairs to enter and 1 rail. Pt ambulated without device and was independent PTA. OBJECTIVE:   Initial Evaluation  Date: 3/8/22 Treatment  3/10/22 Short Term/ Long Term   Goals   AM-PAC 6 Clicks     Was pt agreeable to Eval/treatment? Yes yes    Does pt have pain?  7/10 surgical pain \"pulling\" at surgical site, does not rate    Bed Mobility  Rolling: NT  Supine to sit: MaxA with HOB elevated  Sit to supine: NT  Scooting: MaxA Rolling: NT  Supine to sit: Fadi  Sit to supine: NT  Scooting: Fadi Fadi   Transfers Sit to stand: Fadi  Stand to sit: ModA  Stand pivot: Fadi no device Sit to stand: Fadi  Stand to sit: Fadi  Stand pivot: Fadi no device Independent   Ambulation   A few steps to chair with Fadi no device 15 feet and 25 feet with no AD Fadi >400 feet Independently   Stair negotiation: ascended and descended NT NT >4 steps with 1 rail Mod Independent   ROM BUE:  Defer to OT note  BLE:  WFL     Strength BUE:  Defer to OT note  BLE:  4/5 grossly  Increase by 1/3 MMT grade   Balance Sitting EOB:  Fadi dynamic  Dynamic Standing:  Fadi no device Sitting EOB:  SBA  Dynamic Standing:  Fadi no device Sitting EOB:  Independent  Dynamic Standing:  Independent     Pt is A & O x 4  Sensation:  Pt denies numbness and tingling to extremities  Edema:  None noted    Vitals:  SPO2 monitored throughout session on room air: 90-94%  Patient education  Pt educated on role of PT, sternal precautions, importance of continued mobility for improved strength    Patient response to education: Pt verbalized understanding Pt demonstrated skill Pt requires further education in this area   yes yes yes     ASSESSMENT:    Comments:  Patient semi-supine in bed upon entry and agreeable to PT treatment. Patient educated on sternal precautions with verbalized good understanding. Patient able to complete bed mobility with light assist and cues for maintaining sternal precautions throughout. Patient sat EOB for approximately 8 minutes during session with no hands on assist for sitting balance. Patient able to ambulate short distances in room with significantly slow gait speed and guarded posture. Extra time required to ambulate short distance. Patient agreeable to sit in chair in room. Patient left sitting in chair at end of session with all needs met. Treatment:  Patient practiced and was instructed in the following treatment:     Bed Mobility: VCs provided for sequencing and safety during mobility. Manual assist provided for completion of task.  Transfer Training: Verbal and tactile cueing provided for sequencing and safety during mobility. Manual assist provided for completion of task   Gait Training: Ambulation with no AD and verbal cues for proper technique and safety. Manual assist provided for completion of task     PLAN:    Patient is making good progress towards established goals. Will continue with current POC.       Time in  1035  Time out  1059    Total Treatment Time  24 minutes     CPT codes:  [] Gait training 45500 - minutes  [] Manual therapy 87924 - minutes  [x] Therapeutic activities 11342 24 minutes  [] Therapeutic exercises 67399 - minutes  [] Neuromuscular reeducation 58372 - minutes    Silvana Voss PT, DPT  [id number]

## 2022-03-10 NOTE — PROGRESS NOTES
POD#3 Awake, alert. No complaints. Denies CP, palpitations, SOB at rest, dizziness/lightheadedness. Vitals:    03/10/22 0430 03/10/22 0554 03/10/22 0746 03/10/22 0833   BP: (!) 105/59  98/69 105/61   Pulse: 92  91    Resp: 20  18    Temp: 97.9 °F (36.6 °C)  97.6 °F (36.4 °C)    TempSrc: Temporal  Temporal    SpO2: 96%  95% 95%   Weight:  166 lb 9.6 oz (75.6 kg)     Height:         O2: RA      Intake/Output Summary (Last 24 hours) at 3/10/2022 0909  Last data filed at 3/10/2022 0501  Gross per 24 hour   Intake 180 ml   Output 1870 ml   Net -1690 ml       +BM pre-op    UO: 1020mL/8hr   CT output: L Pleural: 50mL/8hr (150mL/24hrs); R Pleural 40ml/8hrs (150ml/24hrs)      Recent Labs     03/08/22  0500 03/09/22  0826 03/10/22  0453   WBC 8.0 10.2 8.4   HGB 10.9* 11.2* 12.0*   HCT 32.0* 32.9* 35.3*   * 98* 112*      Recent Labs     03/08/22  0500 03/09/22  0625 03/10/22  0453   BUN 7 9 8   CREATININE 0.8 0.7 0.7         Telemetry: SR/ST      PE  Cardiac: RRR  Lungs: decreased bases  Chest incision with intact YAZMIN DSD. Sternum stable. Prior chest tube site incisions C/D/I, no erythema with intact sutures. Chest tubes x 2 and Epicardial pacing wires present and secure. Abd: Soft, nontender, +BS  Ext: Incisions C/D/I, approximated, no erythema, mild ecchymosis LLE       A/P: POD#3    1. CAD  --Stable s/p Aortic valve replacement with 25 mm Pittman Inspiris bioprosthetic, Mitral valve repair by decalcification of the anterior leaflet of the mitral valve, CABG x 3 (LIMA-LAD, SVG-Diag, SVG-RCA),GREGORIA exclusion with 35 mM AtriClip  --Post op [facility] reveals normal EF  --will order TTE prior to discharge to establish baseline, ordered on 3/9  --Scr stable 0.7  --ASA/statin/BB  --reinforce sternal precautions  --continue epicardial pacing wires  --chest tubes without significant output and without airleaks.   Likely remove today  --will need to add plavix at dc if no post op afib        2. Expected acute blood loss anemia secondary to open heart surgery  --stable yesterday, hgb 12.0        3. NSR/ST  --continue BB with hold parameters - currently Lopressor 25mg BID - unable to titrate up at this time due to pressure 105/61 - HR 90s-low 100s        4. Expected acute pulmonary insufficiency in the setting following surgery  --wean oxygen to keep SpO2 greater than or equal to 92%  --continue duonebs with ezpap  --encourage C&DB, SMI  --currently on RA        5. Constipation--expected delayed return of bowel function  --secondary to anesthesia, narcotics, decreased oral intake, and decreased physical activity   --no BM since prior to surgery  --Continue daily MOM/oral bisacodyl until +BM and senna-s as scheduled.    --Will give daily suppository until +BM starting POD 3 if no result by then   --Encouraged continued increase in oral intake and activity.          6. Expected deconditioning in the setting following surgery  --Increase activity as tolerated  --PT/OT  --Ambulated 100ft yesterday           DVT prophylaxis:  --continue bilateral knee high PAPA hose  --continue PCDs  --continue progressive ambulation  --Add lovenox for dvt prophylaxis and continue knee high PAPA hose/pcds/progressive ambulation        Dispo: home vs. Rehab pending progression in activity level        This patient's case and care plan was discussed with the attending surgeon

## 2022-03-10 NOTE — DISCHARGE INSTR - COC
Continuity of Care Form    Patient Name: Wilma Henry   :    MRN:  [id number]    Admit date:  3/7/2022  Discharge date:  3/11/2022    Code Status Order: Prior   Advance Directives:   [place] Directive Type of Healthcare Directive Copy in [address] Agent's Name Healthcare Agent's Phone Number    [phone] Yes, patient has an advance directive for healthcare treatment Living will Yes, copy in chart Healthcare power of  Awilda Harrell [phone]            Admitting Physician:  Roslyn Goetz MD  PCP: David Neves MD    Discharging Nurse: [address] [place] [address] Unit/Room#: 4642/1318-G  Discharging Unit Phone Number: [phone]    Emergency Contact:   Extended Emergency Contact Information  Primary Emergency Contact: Isai Naylor  Address: [address]  Home Phone: [phone]  Mobile Phone: [phone]  Relation: Other  Secondary Emergency Contact: Ana Lliia Giron  Home Phone: [phone]  Mobile Phone: [phone]  Relation: Other    Past Surgical History:  Past Surgical History:   Procedure Laterality Date    CORONARY ARTERY BYPASS GRAFT N/A 3/7/2022    CABG CORONARY ARTERY BYPASS, AVR performed by Roslyn Goetz MD at [address] [place] [address]      8-10 years ago ([id number])       Immunization History: There is no immunization history on file for this patient.     Active Problems:  Patient Active Problem List   Diagnosis Code    Severe aortic stenosis I35.0    Psoriasis L40.9    Bicuspid aortic valve Q23.1    Immunosuppression due to drug therapy ([facility] [address] D84.821, Z79.899    CAD in native artery I25.10    COVID-19 U07.1       Isolation/Infection:   Isolation            No Isolation          Patient Infection Status       Infection Onset Added Last Indicated Last Indicated By Review Planned Expiration Resolved Resolved By    None active    Resolved COVID-19 (Rule Out) 03/03/22 03/03/22 03/03/22 Covid-19 Ambulatory (Ordered)   03/04/22 Rule-Out Test Resulted    COVID-19 (Rule Out) 01/31/22 01/31/22 01/31/22 COVID-19 Ambulatory (Ordered)   01/31/22 Rule-Out Test Resulted            Nurse Assessment:  Last Vital Signs: /61   Pulse 91   Temp 97.6 °F (36.4 °C) (Temporal)   Resp 18   Ht 5' 10\" (1.778 m)   Wt 166 lb 9.6 oz (75.6 kg)   SpO2 95%   BMI 23.90 kg/m²     Last documented pain score (0-10 scale): Pain Level: 0  Last Weight:   Wt Readings from Last 1 Encounters:   03/10/22 166 lb 9.6 oz (75.6 kg)     Mental Status:  oriented and alert    IV Access:  - None    Nursing Mobility/ADLs:  Walking   Independent  Transfer  Assisted  Bathing  Assisted  Dressing  Assisted  Toileting  Assisted  Feeding  Independent  Med Admin  Dependent  Med Delivery   whole    Wound Care Documentation and Therapy:        Elimination:  Continence: Bowel: Yes  Bladder: Yes  Urinary Catheter: None   Colostomy/Ileostomy/Ileal Conduit: No       Date of Last BM: 3/10/2022    Intake/Output Summary (Last 24 hours) at 3/10/2022 1120  Last data filed at 3/10/2022 1000  Gross per 24 hour   Intake 360 ml   Output 2220 ml   Net -1860 ml     I/O last 3 completed shifts: In: 780 [P.O.:780]  Out: 2510 [Urine:2120; Chest Tube:390]    Safety Concerns: At Risk for Falls    Impairments/Disabilities:      None    Nutrition Therapy:  Current Nutrition Therapy:   - Oral Diet:  Cardiac    Routes of Feeding: Oral  Liquids: No Restrictions  Daily Fluid Restriction: no  Last Modified Barium Swallow with Video (Video Swallowing Test): not done    Treatments at the Time of Hospital Discharge:   Respiratory Treatments: ***  Oxygen Therapy:  is not on home oxygen therapy.   Ventilator:    - No ventilator support    Rehab Therapies: PT and OT to eval and treat   Weight Bearing Status/Restrictions: No weight bearing restrictions  Other Medical Equipment (for information only, NOT a DME order): hospital bed  Other Treatments:please follow the cardiac rehab protocol enclosed!!!    Patient's personal belongings (please select all that are sent with patient):  Joey    RN SIGNATURE:  Karyle Sing    CASE MANAGEMENT/SOCIAL WORK SECTION    Inpatient Status Date: ***    Readmission Risk Assessment Score:  Readmission Risk              Risk of Unplanned Readmission:  10           Discharging to Facility/ Agency   Name: [facility]   Address:  Phone:  Fax:    Dialysis Facility (if applicable)   Name:  Address:  Dialysis Schedule:  Phone:  Fax:    / signature: Electronically signed by OLEGARIO Barger on 03/11/2022 at 11:21 AM      PHYSICIAN SECTION    Prognosis: Good    Condition at Discharge: Stable    Rehab Potential (if transferring to Rehab): Good    Recommended Labs or Other Treatments After Discharge:     Physician Certification: I certify the above information and transfer of Elieser Paul  is necessary for the continuing treatment of the diagnosis listed and that he requires [place] for less 30 days.      Update Admission H&P: SEE LAST PROGRESS NOTE    PHYSICIAN SIGNATURE:  Electronically signed by Germán De León PA-C on 3/11/2022 at 10:36 AM

## 2022-03-10 NOTE — CARE COORDINATION
SOCIAL WORK/CASEMANAGEMENT TRANSITION OF CARE [id number] Lyndsay Badillo, [address], [facility], [phone]): pt was accepted to [facility]. Rep started the precert and will send updated PT And OT notes as well later today for tentative discharge tomorrow per ctvs. Pt is in agreement. All discharge paper work with hens started and the cardiac protocol in envelope.  OLEGARIO Amaya  3/10/2022

## 2022-03-11 VITALS
SYSTOLIC BLOOD PRESSURE: 108 MMHG | BODY MASS INDEX: 23.62 KG/M2 | DIASTOLIC BLOOD PRESSURE: 64 MMHG | WEIGHT: 165 LBS | TEMPERATURE: 98.2 F | OXYGEN SATURATION: 95 % | RESPIRATION RATE: 16 BRPM | HEART RATE: 100 BPM | HEIGHT: 70 IN

## 2022-03-11 LAB
ANION GAP SERPL CALCULATED.3IONS-SCNC: 9 MMOL/L (ref 7–16)
BUN BLDV-MCNC: 11 MG/DL (ref 6–23)
CALCIUM SERPL-MCNC: 8.6 MG/DL (ref 8.6–10.2)
CHLORIDE BLD-SCNC: 99 MMOL/L (ref 98–107)
CO2: 30 MMOL/L (ref 22–29)
CREAT SERPL-MCNC: 0.7 MG/DL (ref 0.7–1.2)
GFR AFRICAN AMERICAN: >60
GFR NON-AFRICAN AMERICAN: >60 ML/MIN/1.73
GLUCOSE BLD-MCNC: 98 MG/DL (ref 74–99)
HCT VFR BLD CALC: 30.1 % (ref 37–54)
HEMOGLOBIN: 10.2 G/DL (ref 12.5–16.5)
MAGNESIUM: 2.1 MG/DL (ref 1.6–2.6)
MCH RBC QN AUTO: 31.7 PG (ref 26–35)
MCHC RBC AUTO-ENTMCNC: 33.9 % (ref 32–34.5)
MCV RBC AUTO: 93.5 FL (ref 80–99.9)
METER GLUCOSE: 144 MG/DL (ref 74–99)
PDW BLD-RTO: 12.5 FL (ref 11.5–15)
PLATELET # BLD: 145 E9/L (ref 130–450)
PMV BLD AUTO: 10.6 FL (ref 7–12)
POTASSIUM SERPL-SCNC: 4.1 MMOL/L (ref 3.5–5)
RBC # BLD: 3.22 E12/L (ref 3.8–5.8)
SARS-COV-2, NAAT: NOT DETECTED
SODIUM BLD-SCNC: 138 MMOL/L (ref 132–146)
WBC # BLD: 6.7 E9/L (ref 4.5–11.5)

## 2022-03-11 PROCEDURE — 87635 SARS-COV-2 COVID-19 AMP PRB: CPT

## 2022-03-11 PROCEDURE — 93798 PHYS/QHP OP CAR RHAB W/ECG: CPT

## 2022-03-11 PROCEDURE — 83735 ASSAY OF MAGNESIUM: CPT

## 2022-03-11 PROCEDURE — 2580000003 HC RX 258: Performed by: NURSE PRACTITIONER

## 2022-03-11 PROCEDURE — 93308 TTE F-UP OR LMTD: CPT

## 2022-03-11 PROCEDURE — 80048 BASIC METABOLIC PNL TOTAL CA: CPT

## 2022-03-11 PROCEDURE — 6360000002 HC RX W HCPCS: Performed by: PHYSICIAN ASSISTANT

## 2022-03-11 PROCEDURE — 6370000000 HC RX 637 (ALT 250 FOR IP): Performed by: NURSE PRACTITIONER

## 2022-03-11 PROCEDURE — 82962 GLUCOSE BLOOD TEST: CPT

## 2022-03-11 PROCEDURE — 85027 COMPLETE CBC AUTOMATED: CPT

## 2022-03-11 PROCEDURE — 36415 COLL VENOUS BLD VENIPUNCTURE: CPT

## 2022-03-11 RX ORDER — FERROUS SULFATE 325(65) MG
325 TABLET ORAL 2 TIMES DAILY WITH MEALS
Qty: 60 TABLET | Refills: 0
Start: 2022-03-11 | End: 2022-04-05 | Stop reason: ALTCHOICE

## 2022-03-11 RX ORDER — CLOPIDOGREL BISULFATE 75 MG/1
75 TABLET ORAL DAILY
Qty: 45 TABLET | Refills: 0
Start: 2022-03-12 | End: 2022-04-18 | Stop reason: SDUPTHER

## 2022-03-11 RX ORDER — ROSUVASTATIN CALCIUM 20 MG/1
20 TABLET, COATED ORAL DAILY
Qty: 30 TABLET | Refills: 3
Start: 2022-03-12 | End: 2022-04-18 | Stop reason: SDUPTHER

## 2022-03-11 RX ORDER — FOLIC ACID 1 MG/1
1 TABLET ORAL DAILY
Qty: 30 TABLET | Refills: 0
Start: 2022-03-12 | End: 2022-04-05 | Stop reason: ALTCHOICE

## 2022-03-11 RX ORDER — CLOPIDOGREL BISULFATE 75 MG/1
75 TABLET ORAL DAILY
Status: DISCONTINUED | OUTPATIENT
Start: 2022-03-12 | End: 2022-03-11 | Stop reason: HOSPADM

## 2022-03-11 RX ORDER — ASCORBIC ACID 500 MG
500 TABLET ORAL 2 TIMES DAILY
Qty: 60 TABLET | Refills: 0
Start: 2022-03-11 | End: 2022-10-13

## 2022-03-11 RX ORDER — HYDROCODONE BITARTRATE AND ACETAMINOPHEN 5; 325 MG/1; MG/1
1 TABLET ORAL EVERY 4 HOURS PRN
Qty: 28 TABLET | Refills: 0 | Status: SHIPPED | OUTPATIENT
Start: 2022-03-11 | End: 2022-03-18

## 2022-03-11 RX ORDER — PANTOPRAZOLE SODIUM 40 MG/1
40 TABLET, DELAYED RELEASE ORAL DAILY
Qty: 14 TABLET | Refills: 0
Start: 2022-03-12 | End: 2022-10-13 | Stop reason: CLARIF

## 2022-03-11 RX ADMIN — Medication 400 MG: at 09:54

## 2022-03-11 RX ADMIN — BISACODYL 5 MG: 5 TABLET, COATED ORAL at 09:54

## 2022-03-11 RX ADMIN — ASPIRIN 81 MG: 81 TABLET, COATED ORAL at 09:54

## 2022-03-11 RX ADMIN — INSULIN LISPRO 1 UNITS: 100 INJECTION, SOLUTION INTRAVENOUS; SUBCUTANEOUS at 09:55

## 2022-03-11 RX ADMIN — OXYCODONE HYDROCHLORIDE AND ACETAMINOPHEN 500 MG: 500 TABLET ORAL at 09:55

## 2022-03-11 RX ADMIN — FOLIC ACID 1 MG: 1 TABLET ORAL at 09:54

## 2022-03-11 RX ADMIN — ROSUVASTATIN 20 MG: 20 TABLET, FILM COATED ORAL at 09:54

## 2022-03-11 RX ADMIN — TAMSULOSIN HYDROCHLORIDE 0.4 MG: 0.4 CAPSULE ORAL at 09:54

## 2022-03-11 RX ADMIN — OXYCODONE 5 MG: 5 TABLET ORAL at 03:12

## 2022-03-11 RX ADMIN — FERROUS SULFATE TAB 325 MG (65 MG ELEMENTAL FE) 325 MG: 325 (65 FE) TAB at 09:54

## 2022-03-11 RX ADMIN — Medication 10 ML: at 09:55

## 2022-03-11 RX ADMIN — SENNOSIDES AND DOCUSATE SODIUM 1 TABLET: 50; 8.6 TABLET ORAL at 09:54

## 2022-03-11 RX ADMIN — METOPROLOL TARTRATE 25 MG: 25 TABLET, FILM COATED ORAL at 09:54

## 2022-03-11 RX ADMIN — ENOXAPARIN SODIUM 40 MG: 100 INJECTION SUBCUTANEOUS at 09:55

## 2022-03-11 RX ADMIN — PANTOPRAZOLE SODIUM 40 MG: 40 TABLET, DELAYED RELEASE ORAL at 09:55

## 2022-03-11 ASSESSMENT — PAIN DESCRIPTION - DESCRIPTORS: DESCRIPTORS: ACHING;DISCOMFORT;SORE

## 2022-03-11 ASSESSMENT — PAIN SCALES - GENERAL
PAINLEVEL_OUTOF10: 0
PAINLEVEL_OUTOF10: 0
PAINLEVEL_OUTOF10: 4

## 2022-03-11 ASSESSMENT — PAIN DESCRIPTION - PAIN TYPE: TYPE: ACUTE PAIN

## 2022-03-11 ASSESSMENT — PAIN DESCRIPTION - PROGRESSION: CLINICAL_PROGRESSION: NOT CHANGED

## 2022-03-11 ASSESSMENT — PAIN DESCRIPTION - ONSET: ONSET: AWAKENED FROM SLEEP

## 2022-03-11 ASSESSMENT — PAIN DESCRIPTION - FREQUENCY: FREQUENCY: INTERMITTENT

## 2022-03-11 ASSESSMENT — PAIN DESCRIPTION - ORIENTATION: ORIENTATION: MID;LOWER

## 2022-03-11 ASSESSMENT — PAIN DESCRIPTION - LOCATION: LOCATION: BACK

## 2022-03-11 NOTE — PATIENT CARE CONFERENCE
P Quality Flow/Interdisciplinary Rounds Progress Note        Quality Flow Rounds held on March 11, 2022    Disciplines Attending:  Bedside Nurse, ,  and Nursing Unit Leadership    Adrianne Hammer was admitted on 3/7/2022  5:24 AM    Anticipated Discharge Date:  Expected Discharge Date: 03/12/22    Disposition:    Aleksey Score:  Aleksey Scale Score: 20    Readmission Risk              Risk of Unplanned Readmission:  10           Discussed patient goal for the day, patient clinical progression, and barriers to discharge.   The following Goal(s) of the Day/Commitment(s) have been identified:  Discharge - Obtain Order AND FILL OUT DEAN GOING TO [address]      Andrew Juares, RN  March 11, 2022

## 2022-03-11 NOTE — PROGRESS NOTES
Patient given written and verbal discharge instructions. [facility] getting transported by family. Sheyla Stager given nurse to nurse and discharge instructions faxed to facility. Script for norco given to patient in envelope to be given to staff . smi and additional pair of diann hose given to patient.

## 2022-03-11 NOTE — PLAN OF CARE
Problem: Falls - Risk of:  Goal: Will remain free from falls  Description: Will remain free from falls  Outcome: Met This Shift  Goal: Absence of physical injury  Description: Absence of physical injury  Outcome: Met This Shift     Problem:  Activity Intolerance:  Goal: Able to perform prescribed physical activity  Description: Able to perform prescribed physical activity  Outcome: Met This Shift  Goal: Ability to tolerate increased activity will improve  Description: Ability to tolerate increased activity will improve  Outcome: Met This Shift     Problem: Anxiety:  Goal: Level of anxiety will decrease  Description: Level of anxiety will decrease  Outcome: Met This Shift     Problem: Cardiac Output - Decreased:  Goal: Cardiac output within specified parameters  Description: Cardiac output within specified parameters  Outcome: Met This Shift  Goal: Hemodynamic stability will improve  Description: Hemodynamic stability will improve  Outcome: Met This Shift     Problem: Fluid Volume - Imbalance:  Goal: Ability to achieve a balanced intake and output will improve  Description: Ability to achieve a balanced intake and output will improve  Outcome: Met This Shift     Problem: Gas Exchange - Impaired:  Goal: Levels of oxygenation will improve  Description: Levels of oxygenation will improve  Outcome: Met This Shift  Goal: Ability to maintain adequate ventilation will improve  Description: Ability to maintain adequate ventilation will improve  Outcome: Met This Shift     Problem: Pain:  Goal: Pain level will decrease  Description: Pain level will decrease  Outcome: Met This Shift  Goal: Control of acute pain  Description: Control of acute pain  Outcome: Met This Shift     Problem: Tissue Perfusion - Cardiopulmonary, Altered:  Goal: Absence of angina  Description: Absence of angina  Outcome: Met This Shift  Goal: Hemodynamic stability will improve  Description: Hemodynamic stability will improve  Outcome: Met This Shift  Goal: Will show no evidence of cardiac arrhythmias  Description: Will show no evidence of cardiac arrhythmias  Outcome: Met This Shift     Problem: Pain:  Goal: Pain level will decrease  Description: Pain level will decrease  Outcome: Met This Shift     Problem: Musculor/Skeletal Functional Status  Goal: Absence of falls  Outcome: Met This Shift     Problem: Skin Integrity:  Goal: Will show no infection signs and symptoms  Description: Will show no infection signs and symptoms  Outcome: Met This Shift  Goal: Absence of new skin breakdown  Description: Absence of new skin breakdown  Outcome: Met This Shift

## 2022-03-11 NOTE — PROGRESS NOTES
POD# 4  Awake, alert. No complaints other than feeling fatigued. Denies CP, palpitations, SOB at rest, dizziness/lightheadedness. Vitals:    03/10/22 2132 03/10/22 2318 03/11/22 0312 03/11/22 0401   BP:  112/72 106/67    Pulse:  86 91    Resp:  16 16    Temp:  98.2 °F (36.8 °C) 98.4 °F (36.9 °C)    TempSrc:  Temporal Temporal    SpO2: 94% 96% 98%    Weight:    165 lb (74.8 kg)   Height:         RA      Intake/Output Summary (Last 24 hours) at 3/11/2022 6038  Last data filed at 3/11/2022 0025  Gross per 24 hour   Intake 490 ml   Output 1275 ml   Net -785 ml         ? +BM per patient ( unwitnessed per nursing staff)       Recent Labs     03/09/22  0826 03/10/22  0453 03/11/22  0731   WBC 10.2 8.4 6.7   HGB 11.2* 12.0* 10.2*   HCT 32.9* 35.3* 30.1*   PLT 98* 112* 145      Recent Labs     03/09/22  0625 03/10/22  0453   BUN 9 8   CREATININE 0.7 0.7         Telemetry: SR      PE  Cardiac: RRR  Lungs: decreased bases  Chest incision with intact YAZMIN DSD. Prior chest tube site incisions C/D/I, no erythema with intact sutures. Epicardial pacing wires present and secure. Abd: Soft, nontender, +BS  Ext: Incisions C/D/I, approximated, no erythema, no edema           A/P: POD# 4    1. CAD  --Stable s/p Aortic valve replacement with 25 mm Pittman Inspiris bioprosthetic, Mitral valve repair by decalcification of the anterior leaflet of the mitral valve, CABG x 3 (LIMA-LAD, SVG-Diag, SVG-RCA),GREGORIA exclusion with 35 mM AtriClip  --Post op [facility] reveals normal EF  --will order TTE prior to discharge to establish baseline, ordered on 3/9  --ASA/statin/BB  --reinforce sternal precautions  --remove epicardial pacing wires today. Epicardial pacing wires cut without difficulty. Patient tolerated well  -- All CTs out   --Plavix started today         2. Expected acute blood loss anemia secondary to open heart surgery  --stable - hgb 10.2         3.  NSR/intermittent ST   --continue BB with hold parameters - currently Lopressor 25mg BID - unable to titrate up at this time due to pressure 105/61 - HR 90s-low 100s        4. Expected acute pulmonary insufficiency in the setting following surgery  --wean oxygen to keep SpO2 greater than or equal to 92%  --continue duonebs with ezpap  --encourage C&DB, SMI  --currently on RA        5. Constipation--expected delayed return of bowel function  --secondary to anesthesia, narcotics, decreased oral intake, and decreased physical activity   -- patient reports + BM , not witnessed by staff   --Continue daily MOM/oral bisacodyl until +BM and senna-s as scheduled. --Encouraged continued increase in oral intake and activity.          6. Expected deconditioning in the setting following surgery  --Increase activity as tolerated  --PT/OT    7.  Hx of Psorasis   - On Humira at home q 2 weeks - RESUME IN 6 WEEKS      DVT prophylaxis:  --continue bilateral knee high PAPA hose  --continue PCDs  --continue progressive ambulation  -- lovenox for dvt prophylaxis and continue knee high PAPA hose/pcds/progressive ambulation        Dispo:Rehab at [place], kenia obtained DC today        This patient's case and care plan was discussed with the attending surgeon

## 2022-03-11 NOTE — CARE COORDINATION
SOCIAL WORK/CASEMANAGEMENT TRANSITION OF CARE [id number] [place], [address], Tabitha Herrmann, [phone]): precert obtained from [facility] this a.m. sent email to echo dept head the need for the echo to be done today. OLEGARIO Cain  3/11/2022  [employer] ambulette setup for 3:30 p.m. private pay with number for pt to call to pay before they come and get him. rn notified and sent for a covid. Snf/loc to [facility]. precert was obtained this a.m. echo done this a.m. hens completed. A friend is coming around 11 a.m. and if he can take pt we will cancel the ambulette.  OLEGARIO Cain  3/11/2022    Friend here to take pt to [facility]. Cancelled [address] [facility], [id number]  3/11/2022

## 2022-03-15 NOTE — DISCHARGE SUMMARY
Physician Discharge Summary     Patient ID:  Oracoi Garibay  [id number]  73 y.o.  1955    Admit date: 3/7/2022    Discharge date and time: 3/11/2022  2:15 PM     Admitting Physician: Ortega English MD     Discharge Physician: Haile Quinn MD     Discharge Diagnoses: Severe aortic stenosis, severe multivessel  coronary artery disease, and granulomatous lung disease    OPERATIONS:  1. Sternotomy. 2.  Aortic valve replacement with a 25 mm Pittman Inspiris  bioprosthesis. 3.  Mitral valve repair by decalcification of the anterior leaflet of  the mitral valve. 4.  Coronary artery bypass grafting x3, left internal mammary artery to  LAD, saphenous vein graft of the diagonal branch to the LAD, saphenous  vein graft to the right coronary artery. 5.  Left atrial appendage occlusion with a 35 mm AtriClip. 6.  Endoscopic harvesting, left lower extremity greater saphenous vein. 7.  Rigid internal fixation of the sternum with Александр plates x2. Discharged Condition: stable    Indication for Admission: This is a 51-year-old man who was found to have severe aortic  stenosis. He admitted to some [facility] and cardiac cath showed severe multivessel  coronary artery disease. He was referred for surgical intervention. Heart team evaluation was performed to decide between a transcatheter  valve versus surgical aortic valve and the heart team felt that he was  best severed with surgical intervention. The patient was described the  procedure in full including the risks and complications including but  not limited to bleeding, infection, need for reoperation, hemothorax,  pneumothorax, stroke, myocardial infarction, and death, and the patient  agreed to proceed.     Hospital Course: Course as above, and on 3/7/22 the patient underwent Sternotomy/Aortic valve replacement with 25 mm Pittman Inspiris bioprosthetic/Mitral valve repair by decalcification of the anterior leaflet of the mitral valve/CABG x 3 (LIMA-LAD, SVG-Diag, SVG-RCA)/GREGORIA exclusion with 35 mM AtriClip/EVH LLE/Rigid internal fixation of the sternum with [place] plates x 2 Postoperatively, he was transferred to the CVICU in guarded stable condition and extubated once indications met. Once appropriate, he was transferred to the monitored stepdown unit and beta blockade was initiated. The mediastinal/pleural chest tubes, and epicardial pacing wires were discontinued in the usual fashion. Supplemental oxygen was weaned off, all ambulatory requirements were met, and he was deemed ready for discharge. He was discharged to rehab on POD 4  in stable condition with a post-operative appointment scheduled. Discharge Exam:  PE  Cardiac: RRR  Lungs: decreased bases  Chest incision with intact YAZMIN DSD. Prior chest tube site incisions C/D/I, no erythema with intact sutures. Epicardial pacing wires present and secure.    Abd: Soft, nontender, +BS  Ext: Incisions C/D/I, approximated, no erythema, no edema    Disposition: rehab     Patient Instructions:      Medication List      START taking these medications    ascorbic acid 500 MG tablet  Commonly known as: VITAMIN C  Take 1 tablet by mouth 2 times daily     clopidogrel 75 MG tablet  Commonly known as: PLAVIX  Take 1 tablet by mouth daily     enoxaparin 40 MG/0.4ML injection  Commonly known as: LOVENOX  Inject 0.4 mLs into the skin daily Continue until patient consistently ambulating in hallways     ferrous sulfate 325 (65 Fe) MG tablet  Commonly known as: IRON 325  Take 1 tablet by mouth 2 times daily (with meals)     folic acid 1 MG tablet  Commonly known as: FOLVITE  Take 1 tablet by mouth daily     magnesium oxide 400 (241.3 Mg) MG Tabs tablet  Commonly known as: MAG-OX  Take 1 tablet by mouth daily for 14 days     metoprolol tartrate 25 MG tablet  Commonly known as: LOPRESSOR  Take 1 tablet by mouth 2 times daily     pantoprazole 40 MG tablet  Commonly known as: PROTONIX  Take 1 tablet by mouth daily for 14 days CHANGE how you take these medications    adalimumab 40 MG/0.8ML injection  Commonly known as: HUMIRA  Inject 0.8 mLs into the skin once for 1 dose Indications: every 2 weeks MAY RESUME medication after 4/22  Start taking on: April 22, 2022  What changed:   · additional instructions  · These instructions start on April 22, 2022. If you are unsure what to do until then, ask your doctor or other care provider. HYDROcodone-acetaminophen 5-325 MG per tablet  Commonly known as: Norco  Take 1 tablet by mouth every 4 hours as needed for Pain for up to 7 days. Intended supply: 5 days.  Take lowest dose possible to manage pain  What changed: additional instructions     rosuvastatin 20 MG tablet  Commonly known as: CRESTOR  Take 1 tablet by mouth daily  What changed:   · medication strength  · how much to take        CONTINUE taking these medications    aspirin 81 MG EC tablet  TAKE 1 TABLET BY MOUTH EVERY DAY     b complex vitamins capsule        STOP taking these medications    amoxicillin 500 MG capsule  Commonly known as: AMOXIL           Where to Get Your Medications      You can get these medications from any pharmacy    Bring a paper prescription for each of these medications  · HYDROcodone-acetaminophen 5-325 MG per tablet     Information about where to get these medications is not yet available    Ask your nurse or doctor about these medications  · adalimumab 40 MG/0.8ML injection  · ascorbic acid 500 MG tablet  · clopidogrel 75 MG tablet  · enoxaparin 40 MG/0.4ML injection  · ferrous sulfate 325 (65 Fe) MG tablet  · folic acid 1 MG tablet  · magnesium oxide 400 (241.3 Mg) MG Tabs tablet  · metoprolol tartrate 25 MG tablet  · pantoprazole 40 MG tablet  · rosuvastatin 20 MG tablet       Activity: sternal precautions  Diet: cardiac diet  Wound Care: as directed    Follow-up with   Future Appointments   Date Time Provider Guerrero Desouza   4/5/2022  9:15 AM Papito Ford MD CARDIO SURG [facility]       Smoking cessation education provided prior to discharge    Discussed with patient the benefits of participation in cardiac rehab after discharge once approved safe by the surgeon and that a referral will be made at the follow up appointment. Pt verbalized comprehension.     Signed:  Electronically signed by Fabby May PA-C on 3/15/2022 at 8:58 AM

## 2022-03-25 ENCOUNTER — HOSPITAL ENCOUNTER (OUTPATIENT)
Dept: CARDIAC REHAB | Age: 67
Setting detail: THERAPIES SERIES
Discharge: HOME | End: 2022-03-25

## 2022-03-29 ENCOUNTER — HOSPITAL ENCOUNTER (OUTPATIENT)
Dept: CARDIAC REHAB | Age: 67
Setting detail: THERAPIES SERIES
Discharge: HOME | End: 2022-03-29

## 2022-04-01 ENCOUNTER — HOSPITAL ENCOUNTER (OUTPATIENT)
Dept: CARDIAC REHAB | Age: 67
Setting detail: THERAPIES SERIES
Discharge: HOME | End: 2022-04-01

## 2022-04-04 ENCOUNTER — HOSPITAL ENCOUNTER (OUTPATIENT)
Dept: CARDIAC REHAB | Age: 67
Setting detail: THERAPIES SERIES
Discharge: HOME | End: 2022-04-04

## 2022-04-04 ASSESSMENT — ENCOUNTER SYMPTOMS
SHORTNESS OF BREATH: 0
COUGH: 0

## 2022-04-04 NOTE — PROGRESS NOTES
Subjective:      Patient ID: Alex Munoz is a 61 y.o. male who presents to office for routine 1 month follow up s/p Sternotomy/Aortic valve replacement with 25 mm Pittman Inspiris bioprosthetic/Mitral valve repair by decalcification of the anterior leaflet of the mitral valve/CABG x 3 (LIMA-LAD, SVG-Diag, SVG-RCA)/GREGORIA exclusion with 35 mM AtriClip/EVH LLE/Rigid internal fixation of the sternum with Al[place] plates x 2  on 2/6/22. Patient was [address] home on POD 4 and states he has been doing well since DC. He denies any CP, SOB or incisional problems     HPI    Review of Systems   Constitutional: Negative for chills and fever. Respiratory: Negative for cough and shortness of breath. Cardiovascular: Negative for chest pain, palpitations and leg swelling. Neurological: Negative for light-headedness. Objective:   Physical Exam  Constitutional:       Appearance: Normal appearance. Cardiovascular:      Rate and Rhythm: Normal rate and regular rhythm. Pulses: Normal pulses. Heart sounds: Normal heart sounds. Pulmonary:      Effort: Pulmonary effort is normal.      Breath sounds: Normal breath sounds. Comments: midsternal and chest tube incisions well healed without evidence of infection. Sternum stable. Abdominal:      General: Bowel sounds are normal.   Musculoskeletal:         General: Normal range of motion. Comments: Endoscopic vein harvest incisions intact without evidence of infection     Neurological:      Mental Status: He is alert and oriented to person, place, and time.          Assessment:      S/p Sternotomy/Aortic valve replacement with 25 mm Pittman Inspiris bioprosthetic/Mitral valve repair by decalcification of the anterior leaflet of the mitral valve/CABG x 3 (LIMA-LAD, SVG-Diag, SVG-RCA)/GREGORIA exclusion with 35 mM AtriClip/EVH LLE/Rigid internal fixation of the sternum with Albin plates x 2       Plan:      Remove sternal precautions after 6 weeks post op Cardiac rehab referral  Continue follow up with PCP, Cardiology as scheduled. Encouraged to call office with any questions, concerns. Otherwise no further follow up necessary from CTS standpoint.               Julio Newman PA-C

## 2022-04-05 ENCOUNTER — OFFICE VISIT (OUTPATIENT)
Dept: CARDIOTHORACIC SURGERY | Age: 67
End: 2022-04-05

## 2022-04-05 ENCOUNTER — HOSPITAL ENCOUNTER (OUTPATIENT)
Dept: CARDIAC REHAB | Age: 67
Setting detail: THERAPIES SERIES
Discharge: HOME | End: 2022-04-05

## 2022-04-05 VITALS
HEART RATE: 58 BPM | BODY MASS INDEX: 23.34 KG/M2 | HEIGHT: 70 IN | DIASTOLIC BLOOD PRESSURE: 72 MMHG | SYSTOLIC BLOOD PRESSURE: 123 MMHG | WEIGHT: 163 LBS

## 2022-04-05 DIAGNOSIS — Z98.890 S/P MVR (MITRAL VALVE REPAIR): ICD-10-CM

## 2022-04-05 DIAGNOSIS — Z95.1 S/P CABG (CORONARY ARTERY BYPASS GRAFT): ICD-10-CM

## 2022-04-05 DIAGNOSIS — Z95.2 S/P AVR: ICD-10-CM

## 2022-04-05 PROCEDURE — 99024 POSTOP FOLLOW-UP VISIT: CPT | Performed by: NURSE PRACTITIONER

## 2022-04-05 NOTE — PROGRESS NOTES
RETAIN Activity Prescription     [address] [place] [address] CARDIO SURG  [address] [address] [place] [address]  Dept: [phone]  Dept Fax: [phone]: [phone]  Part 1: General Information:  Patient Information  Name: Brian Rasheed   Visit Date: 04/05/22  Date of Surgery: 3/7/22    Part 2: Return to Work Release:  Patient may return to work: Worker is released to return to work without restrictions on (date): 4/18/22    Activity as per discharge instructions. No lifting over 5-8 lbs, pushing or pulling with upper extremities or weight bearing with upper extremities for 6 weeks post surgery. Sternal precautions are complete 6 weeks post sternotomy/open heart surgery, and patient may return to work at that time without restrictions from a cardiothoracic POV.      LAVELLE Silva CNP  04/05/22  9:15 AM

## 2022-04-05 NOTE — PROGRESS NOTES
RETAIN NEW PATIENT NOTE    Completed today:   [x]  Initial RETAIN consultation. []  Activity Prescription-if any restrictions for work. []  Contact Employer (by phone or email) regarding available accommodations and/or return to work options as required. [] Discuss plan of care with coordinator by phone or [facility] in-basket message. [x] Established patient's return to work goal which is: sternal precautions complete 6 weeks from surgery on 4/18/22. He is free from all restrictions, and can return to work starting on that date. Patient education regarding the importance of:   [x] staying active to avoid deconditioning. [x] returning to work as soon as possible. Work is good for [employer]. Long periods of absence from work are harmful. [x] the patient's currently recommended abilities, activity modifications and workplace modifications. [x] pain, although a part of healing from an injury, does not necessarily mean you are causing harm. [x] patient's roles and responsibilities including close follow up and compliance with recommendations. [] Schedule a follow-up in two weeks.

## 2022-04-07 ENCOUNTER — HOSPITAL ENCOUNTER (OUTPATIENT)
Dept: CARDIAC REHAB | Age: 67
Setting detail: THERAPIES SERIES
Discharge: HOME | End: 2022-04-07

## 2022-04-11 ENCOUNTER — OFFICE VISIT (OUTPATIENT)
Dept: CARDIOLOGY CLINIC | Age: 67
End: 2022-04-11
Payer: COMMERCIAL

## 2022-04-11 VITALS
WEIGHT: 167.4 LBS | HEART RATE: 58 BPM | RESPIRATION RATE: 18 BRPM | SYSTOLIC BLOOD PRESSURE: 118 MMHG | DIASTOLIC BLOOD PRESSURE: 60 MMHG | BODY MASS INDEX: 23.96 KG/M2 | HEIGHT: 70 IN

## 2022-04-11 DIAGNOSIS — Z95.2 S/P AVR: Primary | ICD-10-CM

## 2022-04-11 DIAGNOSIS — Z95.1 S/P CABG (CORONARY ARTERY BYPASS GRAFT): ICD-10-CM

## 2022-04-11 DIAGNOSIS — E78.00 HYPERCHOLESTEREMIA: ICD-10-CM

## 2022-04-11 DIAGNOSIS — Q23.1 BICUSPID AORTIC VALVE: ICD-10-CM

## 2022-04-11 PROCEDURE — 99213 OFFICE O/P EST LOW 20 MIN: CPT | Performed by: INTERNAL MEDICINE

## 2022-04-11 PROCEDURE — 93000 ELECTROCARDIOGRAM COMPLETE: CPT | Performed by: INTERNAL MEDICINE

## 2022-04-11 NOTE — PROGRESS NOTES
Patient Active Problem List   Diagnosis    Severe aortic stenosis    Psoriasis    Bicuspid aortic valve    Immunosuppression due to drug therapy ([facility] [address]    CAD in native artery       Current Outpatient Medications   Medication Sig Dispense Refill    rosuvastatin (CRESTOR) 20 MG tablet Take 1 tablet by mouth daily 30 tablet 3    metoprolol tartrate (LOPRESSOR) 25 MG tablet Take 1 tablet by mouth 2 times daily 60 tablet 3    clopidogrel (PLAVIX) 75 MG tablet Take 1 tablet by mouth daily 45 tablet 0    pantoprazole (PROTONIX) 40 MG tablet Take 1 tablet by mouth daily for 14 days 14 tablet 0    ascorbic acid (VITAMIN C) 500 MG tablet Take 1 tablet by mouth 2 times daily 60 tablet 0     No current facility-administered medications for this visit. CC:    Patient is seen in follow up for:  1. S/P AVR    2. S/P CABG (coronary artery bypass graft)    3. Bicuspid aortic valve    4. Hypercholesteremia        HPI:  Patient is doing well without any specific cardiac problems. Patient denies any shortness of breath, chest pain, lightheadedness or dizziness. Patient is tolerating medications well without side effects.         ROS:   General: No unusual weight gain, no change in exercise tolerance  Skin: No rash or itching  EENT: No vision changes or nosebleeds  Cardiovascular: No orthopnea or paroxysmal nocturnal dyspnea  Respiratory: No cough or hemoptysis  Gastrointestinal: No hematemesis or recent changes in bowel habits  Genitourinary: No hematuria, urgency or frequency  Musculoskeletal: No muscular weakness or joint swelling   Neurologic / Psychiatric: No incoordination or convulsions  Allergic / Immunologic/ Lymphatic / Endocrine: No anemia or bleeding tendency    Social History     Socioeconomic History    Marital status: Single     Spouse name: Not on file    Number of children: Not on file    Years of education: Not on file    Highest education level: Not on file   Occupational History    Not on file Tobacco Use    Smoking status: Current Some Day Smoker     Years: 20.00     Types: Cigars    Smokeless tobacco: Never Used    Tobacco comment: occasionally a cigar   Vaping Use    Vaping Use: Never used   Substance and Sexual Activity    Alcohol use: Yes     Comment: occasionally beer during week    Drug use: Never    Sexual activity: Not on file   Other Topics Concern    Not on file   Social History Narrative    Not on file     Social Determinants of Health     Financial Resource Strain:     Difficulty of Paying Living Expenses: Not on file   Food Insecurity:     Worried About Running Out of Food in the Last Year: Not on file    Yael of Food in the Last Year: Not on file   Transportation Needs:     Lack of Transportation (Medical): Not on file    Lack of Transportation (Non-Medical):  Not on file   Physical Activity:     Days of Exercise per Week: Not on file    Minutes of Exercise per Session: Not on file   Stress:     Feeling of Stress : Not on file   Social Connections:     Frequency of Communication with Friends and Family: Not on file    Frequency of Social Gatherings with Friends and Family: Not on file    Attends Anglican Services: Not on file    Active Member of [address] [place] [address] or Organizations: Not on file    Attends Club or Organization Meetings: Not on file    Marital Status: Not on file   Intimate Partner Violence:     Fear of Current or Ex-Partner: Not on file    Emotionally Abused: Not on file    Physically Abused: Not on file    Sexually Abused: Not on file   Housing Stability:     Unable to Pay for Housing in the Last Year: Not on file    Number of [place] in the Last Year: Not on file    Unstable Housing in the Last Year: Not on file       Family History   Problem Relation Age of Onset    Cancer Mother         breast    Heart Disease Father        Past Medical History:   Diagnosis Date    Aortic stenosis 2021    CAD in native artery 2/4/2022    Hyperlipidemia PHYSICAL EXAM:  CONSTITUTIONAL:  Well developed, well nourished    Vitals:    04/11/22 1328   BP: 118/60   Pulse: 58   Resp: 18   Weight: 167 lb 6.4 oz (75.9 kg)   Height: 5' 10\" (1.778 m)     HEAD & FACE: Normocephalic. Symmetric. EYES: No xanthelasma. Conjunctivae not injected. EARS, NOSE, MOUTH & THROAT: Good dentition. No oral pallor or cyanosis. NECK: No JVD at 30 degrees. No thyromegaly. RESPIRATORY: Clear to auscultation and percussion in all fields. No use of accessory muscle or intercostal retractions. CARDIOVASCULAR: Regular rate and rhythm. No lifts or thrills on palpitation. Auscultation with normal S1-S2 in intensity and splitting. No carotid bruits. Abdominal aorta not enlarged. Femoral arteries without bruits. Pedal pulses 2+. No edema. ABDOMEN: Soft without hepatic or splenic enlargement. No tenderness. MUSCULOSKELETAL: No kyphosis or scoliosis of the back. Good muscle strength and tone. No muscle atrophy. Normal gait and ability to undergo exercise stress testing. EXTREMITIES: No clubbing or cyanosis. SKIN: No Xanthomas or ulcerations. NEUROLOGIC: Oriented to time, place and person. Normal mood and affect. LYMPHATIC:  No palpable neck or supraclavicular nodes. No splenomegaly. EKG: the EKG tracing was reviewed and found to reveal: Normal sinus rhythm. No change compared to prior tracing.  ms. ASSESSMENT:                                                     ORDERS:       Diagnosis Orders   1. S/P AVR     2. S/P CABG (coronary artery bypass graft)  EKG 12 lead   3. Bicuspid aortic valve     4. Hypercholesteremia       Above assessment cardiac issues stable. PLAN:   See above orders. Medication reconciliation completed. Old records were reviewed and found to reveal: Hemoglobin 10.2 on 311/22  Completed return to work forms. Discussed issues that would prompt earlier evaluation. Same cardiac medications.     Follow-up office visit in 6 months.

## 2022-04-13 ENCOUNTER — HOSPITAL ENCOUNTER (OUTPATIENT)
Dept: CARDIAC REHAB | Age: 67
Setting detail: THERAPIES SERIES
Discharge: HOME | End: 2022-04-13
Payer: COMMERCIAL

## 2022-04-13 VITALS
RESPIRATION RATE: 16 BRPM | BODY MASS INDEX: 24.11 KG/M2 | DIASTOLIC BLOOD PRESSURE: 64 MMHG | HEIGHT: 70 IN | HEART RATE: 56 BPM | OXYGEN SATURATION: 98 % | WEIGHT: 168.38 LBS | SYSTOLIC BLOOD PRESSURE: 102 MMHG

## 2022-04-13 ASSESSMENT — PATIENT HEALTH QUESTIONNAIRE - PHQ9
SUM OF ALL RESPONSES TO PHQ QUESTIONS 1-9: 1
SUM OF ALL RESPONSES TO PHQ9 QUESTIONS 1 & 2: 1
1. LITTLE INTEREST OR PLEASURE IN DOING THINGS: 0
SUM OF ALL RESPONSES TO PHQ QUESTIONS 1-9: 1
2. FEELING DOWN, DEPRESSED OR HOPELESS: 1

## 2022-04-13 ASSESSMENT — EJECTION FRACTION: EF_VALUE: 70

## 2022-04-13 NOTE — CARDIO/PULMONARY
Patient stated no prior cardiac history; he had an ACB x 3 with AVR and mitral valve repair on 3/7/22. Sternal and right leg incisions are NOLBERTO and well-approximated with no drainage noted. No edema noted. Patient is a  for [employer] and is due to return to work on May 2nd; states job can be physical at times. His restrictions are to be lifted on April 18th. He has been walking at home, doing his stairs and some housework. Home heart health came to patient's house to work with him post-op. He has also walked for exercise in the past. He denies any orthopedic issues. He denies signs and symptoms of depression; states he had some uncertainty following surgery and is going \"stir crazy\" currently at home.

## 2022-04-18 ENCOUNTER — HOSPITAL ENCOUNTER (OUTPATIENT)
Dept: CARDIAC REHAB | Age: 67
Setting detail: THERAPIES SERIES
Discharge: HOME | End: 2022-04-18
Payer: COMMERCIAL

## 2022-04-18 PROCEDURE — 93798 PHYS/QHP OP CAR RHAB W/ECG: CPT

## 2022-04-18 RX ORDER — CLOPIDOGREL BISULFATE 75 MG/1
75 TABLET ORAL DAILY
Qty: 30 TABLET | Refills: 5 | Status: SHIPPED
Start: 2022-04-18 | End: 2022-10-17

## 2022-04-18 RX ORDER — ROSUVASTATIN CALCIUM 20 MG/1
20 TABLET, COATED ORAL DAILY
Qty: 30 TABLET | Refills: 5 | Status: SHIPPED
Start: 2022-04-18 | End: 2022-10-15

## 2022-04-20 ENCOUNTER — HOSPITAL ENCOUNTER (OUTPATIENT)
Dept: CARDIAC REHAB | Age: 67
Setting detail: THERAPIES SERIES
Discharge: HOME | End: 2022-04-20
Payer: COMMERCIAL

## 2022-04-20 PROCEDURE — 93798 PHYS/QHP OP CAR RHAB W/ECG: CPT

## 2022-04-22 ENCOUNTER — HOSPITAL ENCOUNTER (OUTPATIENT)
Dept: CARDIAC REHAB | Age: 67
Setting detail: THERAPIES SERIES
Discharge: HOME | End: 2022-04-22
Payer: COMMERCIAL

## 2022-04-22 PROCEDURE — 93798 PHYS/QHP OP CAR RHAB W/ECG: CPT

## 2022-04-25 ENCOUNTER — HOSPITAL ENCOUNTER (OUTPATIENT)
Dept: CARDIAC REHAB | Age: 67
Setting detail: THERAPIES SERIES
Discharge: HOME | End: 2022-04-25
Payer: COMMERCIAL

## 2022-04-25 PROCEDURE — 93798 PHYS/QHP OP CAR RHAB W/ECG: CPT

## 2022-04-27 ENCOUNTER — HOSPITAL ENCOUNTER (OUTPATIENT)
Dept: CARDIAC REHAB | Age: 67
Setting detail: THERAPIES SERIES
Discharge: HOME | End: 2022-04-27
Payer: COMMERCIAL

## 2022-04-27 PROCEDURE — 93798 PHYS/QHP OP CAR RHAB W/ECG: CPT

## 2022-04-29 ENCOUNTER — HOSPITAL ENCOUNTER (OUTPATIENT)
Dept: CARDIAC REHAB | Age: 67
Setting detail: THERAPIES SERIES
Discharge: HOME | End: 2022-04-29
Payer: COMMERCIAL

## 2022-04-29 PROCEDURE — 93798 PHYS/QHP OP CAR RHAB W/ECG: CPT

## 2022-05-02 ENCOUNTER — HOSPITAL ENCOUNTER (OUTPATIENT)
Dept: CARDIAC REHAB | Age: 67
Setting detail: THERAPIES SERIES
Discharge: HOME | End: 2022-05-02
Payer: COMMERCIAL

## 2022-05-02 PROCEDURE — 93798 PHYS/QHP OP CAR RHAB W/ECG: CPT

## 2022-05-04 ENCOUNTER — HOSPITAL ENCOUNTER (OUTPATIENT)
Dept: CARDIAC REHAB | Age: 67
Setting detail: THERAPIES SERIES
Discharge: HOME | End: 2022-05-04
Payer: COMMERCIAL

## 2022-05-04 PROCEDURE — 93798 PHYS/QHP OP CAR RHAB W/ECG: CPT

## 2022-05-06 ENCOUNTER — HOSPITAL ENCOUNTER (OUTPATIENT)
Dept: CARDIAC REHAB | Age: 67
Setting detail: THERAPIES SERIES
Discharge: HOME | End: 2022-05-06
Payer: COMMERCIAL

## 2022-05-06 PROCEDURE — 93798 PHYS/QHP OP CAR RHAB W/ECG: CPT

## 2022-05-09 ENCOUNTER — HOSPITAL ENCOUNTER (OUTPATIENT)
Dept: GENERAL RADIOLOGY | Age: 67
Discharge: HOME | End: 2022-05-11
Payer: COMMERCIAL

## 2022-05-09 ENCOUNTER — HOSPITAL ENCOUNTER (OUTPATIENT)
Age: 67
Discharge: HOME | End: 2022-05-11
Payer: COMMERCIAL

## 2022-05-09 DIAGNOSIS — M54.2 CERVICALGIA: ICD-10-CM

## 2022-05-09 PROCEDURE — 72050 X-RAY EXAM NECK SPINE 4/5VWS: CPT

## 2022-05-16 ENCOUNTER — HOSPITAL ENCOUNTER (OUTPATIENT)
Dept: CARDIAC REHAB | Age: 67
Setting detail: THERAPIES SERIES
Discharge: HOME | End: 2022-05-16
Payer: COMMERCIAL

## 2022-05-16 VITALS — HEIGHT: 70 IN | BODY MASS INDEX: 22.98 KG/M2 | WEIGHT: 160.5 LBS

## 2022-05-16 PROCEDURE — 93797 PHYS/QHP OP CAR RHAB WO ECG: CPT

## 2022-05-16 PROCEDURE — 93798 PHYS/QHP OP CAR RHAB W/ECG: CPT

## 2022-05-16 NOTE — PROGRESS NOTES
Cardiac Rehabilitation Nutrition Assessment      NAME: Emily Smiley : 1955 AGE: 61 y.o. GENDER: male    CARDIAC REHAB ADMITTING DIAGNOSIS: s/p CABG x 3, S/P AVR          PROBLEM LIST:    Patient Active Problem List   Diagnosis    Severe aortic stenosis    Psoriasis    Bicuspid aortic valve    [facility]    CAD in native artery       LABS:    Hemoglobin A1C 4.9 ( 3/3/22)     Lab Results   Component Value Date    CHOL 211 2022    HDL 73 2022    LDLCALC 127 2022    TRIG 54 2022    LABVLDL 11 2022     Hemoglobin A1 c 4.9 (within normal )        MEDICATIONS/SUPPLEMENTS:    Prior to Admission medications    Medication Sig Start Date End Date Taking? Authorizing Provider   rosuvastatin (CRESTOR) 20 MG tablet Take 1 tablet by mouth daily 22   Jules Rolon MD   metoprolol tartrate (LOPRESSOR) 25 MG tablet Take 1 tablet by mouth 2 times daily 22   Jules Rolon MD   clopidogrel (PLAVIX) 75 MG tablet Take 1 tablet by mouth daily 22  Jules Rolon MD   pantoprazole (PROTONIX) 40 MG tablet Take 1 tablet by mouth daily for 14 days 3/12/22 4/11/22  Mel Barker PA-C   ascorbic acid (VITAMIN C) 500 MG tablet Take 1 tablet by mouth 2 times daily 3/11/22 4/11/22  Mel Barker PA-C     Pertinent Lipid Lowering Medications: Crestor per order. Low cholesterol diet indicated. Patient reports has low iron level and physician prescribed him on iron supplement but patient has not started to take medications. Compliance to treatment plan encouraged. RD gave patient MNT iron content of food list. Patient verbalized understanding.            ANTHROPOMETRICS:    Ht Readings from Last 1 Encounters:   22 5' 10\" (1.778 m)      Wt Readings from Last 10 Encounters:   22 160 lb 8 oz (72.8 kg)   22 168 lb 6 oz (76.4 kg)   22 167 lb 6.4 oz (75.9 kg)   22 163 lb (73.9 kg)   22 165 lb (74.8 kg) 22 170 lb 9.6 oz (77.4 kg)   22 165 lb (74.8 kg)   22 165 lb (74.8 kg)   21 165 lb (74.8 kg)   21 170 lb 12.8 oz (77.5 kg)                 IBW:166 lbs +/- 10%       %IBW: 96%               BMI: 23.1 ( normal weight)     Waist: 36  Inches. Waist circumference within normal limits. Goal for waist circumference in males under 40 inches. Patient verbalized understanding. Wears glasses and appears nourished. Reported Wt Hx:Patient reports he use to weighed over 240 pounds. When mother  in  weight decreased to 215 pounds with further weight loss. Weight at time of cardiac event: 169 to 170 pounds. Cardiac Rehab admit weight: 168 pounds. Patient has lost 8 pounds in 1 month indicating 4.7% weight loss. Weight loss beneficial.        Reported Diet Hx:Patient eats one time per day and snacks  in evening. No food allergies. Unable to tolerate chocolate. No cultural, Protestant or ethnic food preferences. Rate Your Plate Score:not available. (Score 58-72: Making many healthy choices; 41-57: Some choices need improving 24-40: many choices need improving)         24 HOUR DIET RECALL    Breakfast: none         Lunch: none          Dinner: 3:00 pm, out, few [place] potatoes with no salt and few breaded wings, 8 ounces diet Pepsi. Snacks: none          Beverages:  Patient drinks 36 ounces bottled water daily. Rarely drinks diet Pepsi; Drink 2 cups regular coffee daily   Patient does not use Ensure. Sagar Ewing he is moving his bowels and has early satiety at times; did try Ensure in the hospital and disliked and not currently using any oral supplements. Patient declined oral nutrition supplement coupons. Patient reports drinking more tea and water. Environmental/Social:drinks lite beer; not daily; - drinks on weekend - Bud Wiser Zero- no alcohol and no sugar  Will return to work and 2 to 10:30 pm. Works as .    Has adequate funds for food.  Has family and friends cook for patient (leftover meal which includes meat, vegetable and starch)  Not eating at fast food restaurant. Not eating at sit down restaurants. NUTRITION INTERVENTION:    Nutrition 30 / 60 minute one-on-one education & goal setting with Rishabh VILLEGAS Sukhjinder Fuentes with Erna Davis relevant labs compared to ideals. Reviewed weight history and patient's verbalized weight goal as well as any real or perceived barriers to obtaining the goal. Collaborated with patient to set a specific short and long term weight goal.     Reviewed Rate Your Plate directions and conducted a verbal diet recall. Assessed for environmental, financial, psychosocial, physical and comorbidities that may impact the food and eating patterns / behaviors of Erna Farrs     Collaborated with patient to set specific nutrient goals as well as specific food / behavior changes that will help patient meet the overall goal of following a heart healthy eating pattern (using guidelines as set forth by the American Heart Association and modeled after healthful eating patterns as recognized by the USDA Dietary Guidelines such as DASH, Mediterranean or plant-based). Briefly reviewed with Erna Davis the nutrition information: My Plate, Menu planning, dietary resolutions, serving size, label reading, food log, MNT Heart Healthy Low Sodium, eating out, MNT iron rich foods  and encouraged Erna Davis to read thoroughly, ask questions as needed, and use for future reference for heart healthy nutrition information. Erna Davis  is not scheduled to participate in Cardiac Rehab group nutrition classes. PATIENT GOALS:     Weight Goals:Patient will try to maintain current weight within 5 pounds. Short/Long term goal: 169 pounds +/- 5 pounds. Nutrition Goals:Patient will try to follow my plate dietary guidelines.      Daily Recommendations:Patient will eat at least 4 to 5 meals/snacks per day to meet estimated needs. Calories: 2000 calories /day * weight loss not appropriate at this time. (using [address] (9342)  with AF 1.3 )    Estimated Total Protein needs: 60 to 75 grams/day ( based one gram/kg IBW)    Estimated Total Fluid needs: 1800- 2300 ml/day ( based on 25 to 30 ml/kg actual body weight)      Saturated Fat: no more than 20  g/day    Trans Fat: 0 g/day    Sodium: no more than 1500  mg/day    Fruit: 3  cups / day    Vegetables: 2  cups/day      Other:    - read and compare food labels    -Increase water consumption.    -try to eat breakfast at least 3 times per week. Keeping a food diary was recommended.     -Complete rate your plate form and return at next available time. Patient is a phase II participant and ITP done by exercise physiologist.        Questions addressed. Follow-up plans discussed. Contact information provided. Kaylynn Cox verbalized understanding. Cari Xie MA, RDN, LD  Contact phone: [phone].

## 2022-05-18 ENCOUNTER — HOSPITAL ENCOUNTER (OUTPATIENT)
Dept: CARDIAC REHAB | Age: 67
Setting detail: THERAPIES SERIES
Discharge: HOME | End: 2022-05-18
Payer: COMMERCIAL

## 2022-05-18 PROCEDURE — 93798 PHYS/QHP OP CAR RHAB W/ECG: CPT

## 2022-05-20 ENCOUNTER — HOSPITAL ENCOUNTER (OUTPATIENT)
Dept: CARDIAC REHAB | Age: 67
Setting detail: THERAPIES SERIES
Discharge: HOME | End: 2022-05-20
Payer: COMMERCIAL

## 2022-05-20 PROCEDURE — 93798 PHYS/QHP OP CAR RHAB W/ECG: CPT

## 2022-05-23 ENCOUNTER — HOSPITAL ENCOUNTER (OUTPATIENT)
Dept: CARDIAC REHAB | Age: 67
Setting detail: THERAPIES SERIES
Discharge: HOME | End: 2022-05-23
Payer: COMMERCIAL

## 2022-05-23 PROCEDURE — 93798 PHYS/QHP OP CAR RHAB W/ECG: CPT

## 2022-05-25 ENCOUNTER — HOSPITAL ENCOUNTER (OUTPATIENT)
Dept: CARDIAC REHAB | Age: 67
Setting detail: THERAPIES SERIES
Discharge: HOME | End: 2022-05-25
Payer: COMMERCIAL

## 2022-05-25 PROCEDURE — 93798 PHYS/QHP OP CAR RHAB W/ECG: CPT

## 2022-06-01 ENCOUNTER — HOSPITAL ENCOUNTER (OUTPATIENT)
Dept: CARDIAC REHAB | Age: 67
Setting detail: THERAPIES SERIES
Discharge: HOME | End: 2022-06-01
Payer: COMMERCIAL

## 2022-06-01 PROCEDURE — 93798 PHYS/QHP OP CAR RHAB W/ECG: CPT

## 2022-06-10 ENCOUNTER — HOSPITAL ENCOUNTER (OUTPATIENT)
Dept: CARDIAC REHAB | Age: 67
Setting detail: THERAPIES SERIES
Discharge: HOME | End: 2022-06-10
Payer: COMMERCIAL

## 2022-06-10 PROCEDURE — 93798 PHYS/QHP OP CAR RHAB W/ECG: CPT

## 2022-06-13 ENCOUNTER — HOSPITAL ENCOUNTER (OUTPATIENT)
Dept: CARDIAC REHAB | Age: 67
Setting detail: THERAPIES SERIES
Discharge: HOME | End: 2022-06-13
Payer: COMMERCIAL

## 2022-06-13 PROCEDURE — 93798 PHYS/QHP OP CAR RHAB W/ECG: CPT

## 2022-06-15 ENCOUNTER — HOSPITAL ENCOUNTER (OUTPATIENT)
Dept: CARDIAC REHAB | Age: 67
Setting detail: THERAPIES SERIES
Discharge: HOME | End: 2022-06-15
Payer: COMMERCIAL

## 2022-06-15 PROCEDURE — 93798 PHYS/QHP OP CAR RHAB W/ECG: CPT

## 2022-06-16 ENCOUNTER — TELEPHONE (OUTPATIENT)
Dept: CARDIAC REHAB | Age: 67
End: 2022-06-16

## 2022-06-16 NOTE — TELEPHONE ENCOUNTER
6/16/2022 1504 Nutrition: Spoke with patient on this date by phone confirming appointment for 6/20/2022 at 11:30 am. Will remain available.  Electronically signed by Victor M Forrester RD, [place] on 6/16/22 at 3:05 PM EDT

## 2022-06-20 ENCOUNTER — HOSPITAL ENCOUNTER (OUTPATIENT)
Dept: CARDIAC REHAB | Age: 67
Setting detail: THERAPIES SERIES
Discharge: HOME | End: 2022-06-20
Payer: COMMERCIAL

## 2022-06-20 VITALS — WEIGHT: 167 LBS | BODY MASS INDEX: 23.91 KG/M2 | HEIGHT: 70 IN

## 2022-06-20 NOTE — PROGRESS NOTES
CARDIAC REHABILITATION NUTRITION FOLLOW UP/DISCHARGE. NAME: Archie Mercedes : 1955 AGE: 61 y.o. GENDER: male    CARDIAC REHAB ADMITTING DIAGNOSIS: S/P CABGX 3, S/P AVR    PROBLEM LIST:    Patient Active Problem List   Diagnosis    Severe aortic stenosis    Psoriasis    Bicuspid aortic valve    Immunosuppression due to drug therapy ([facility])    CAD in native artery       LABS:    Hemoglobin A1C   Date Value Ref Range Status   2022 4.9 4.0 - 5.6 % Final   2022 5.1 4.0 - 5.6 % Final       Lab Results   Component Value Date    CHOL 211 2022    HDL 73 2022    [address] 127 2022    TRIG 54 2022    LABVLDL 11 2022     UPDATE: NO NEW LABS. MEDICATIONS/SUPPLEMENTS:  .    Prior to Admission medications    Medication Sig Start Date End Date Taking? Authorizing Provider   rosuvastatin (CRESTOR) 20 MG tablet Take 1 tablet by mouth daily 22   Mariaa Cali MD   metoprolol tartrate (LOPRESSOR) 25 MG tablet Take 1 tablet by mouth 2 times daily 22   Mariaa Cali MD   clopidogrel (PLAVIX) 75 MG tablet Take 1 tablet by mouth daily 22  Mariaa Cali MD   pantoprazole (PROTONIX) 40 MG tablet Take 1 tablet by mouth daily for 14 days 3/12/22 4/11/22  Yenni Kumar PA-C   ascorbic acid (VITAMIN C) 500 MG tablet Take 1 tablet by mouth 2 times daily 3/11/22 4/11/22  Yenni Kumar PA-C       PERTINENT LIPID LOWERING MEDICATIONS: Crestor per order. Low cholesterol diet indicated. Patient had no questions about previously given information.       ANTHROPOMETRICS:    Ht Readings from Last 1 Encounters:   22 5' 10\" (1.778 m)      Wt Readings from Last 10 Encounters:   22 167 lb (75.8 kg)   22 160 lb 8 oz (72.8 kg)   22 168 lb 6 oz (76.4 kg)   22 167 lb 6.4 oz (75.9 kg)   22 163 lb (73.9 kg)   22 165 lb (74.8 kg)   22 170 lb 9.6 oz (77.4 kg)   22 165 lb (74.8 kg)   22 165 lb (74.8 kg)   21 165 lb (74.8 kg)       BMI Readings from Last 3 Encounters:   06/20/22 23.96 kg/m²   05/16/22 23.03 kg/m²   04/13/22 24.16 kg/m²           IBW:166  # +/- 10%       %IBW:101%               BMI: 23.96 ( normal weight)     Waist: 35 inches. Waist circumference 40 inches or under for males. Patient waist circumference within normal limits. Patient verbalized understanding. Patient appears lean/slender frame size, nourished appearance and wears glasses. Reported Wt Hx:Patient reports usual weight 169 pounds before surgery. Weight at time of admit to cardiac rehab: 168 pounds. Weight last visit: 160 pounds. Weight today: 167 pounds. Patient gained 7 pounds over 1 month indicating 4.3% weight gain. Weight gain beneficial. Patient on pre-planned weight gain. Goal exceeded and goal on going for discharge to return to usual body weight 169 pounds. RD and patient discussed patient needs to regain 2 pounds. Patient verbalized understanding. Reported Diet Hx:Patient is eating small amount of food at breakfast, lunch and dinner. Patient is not a big eater. Patient unable to eat 4 to 5 meals/snacks per day. Patient is trying to follow my plate guidelines. Goals in progress and goals on going for discharge. Patient is eating bananas and having salad as meal. Goal in progress and goal on going. Patient did not bring any home records or rate your plate form to appointment but reports he is trying to make better food choices. Patient rarely drinks pop, limited coffee, is having juice and slowly increasing water consumption. Goal not met and food log and rate your plate discontinued. Rate Your Plate Score: not available.      (Score 58-72: Making many healthy choices; 41-57: Some choices need improving 24-40: many choices need improving)         24 HOUR DIET RECALL    Breakfast:    8 am, at home, 6 ounces Stewart D juice beverage, 1 -2 cups plain regular coffee,  1 slice wheat toast with  margarine        Lunch: 2;00 pm, out,  1 small serving french fries ( plain) or uses vinegar; with 16.9 ounces unflavored water          Dinner: 6:00 pm - 7:00 pm,  At home,  1 cup salad (head plain lettuce ) with few teaspoons regular ranch dressing  usually likes carrots,  8 ounces [place] [employer] diet Ice tea      Snacks: none         Beverages  Drank Additional bottled water      Desi Norton does participating in rehab. Environmental/Social:Has adequate finances for food; - has little piece meat, vegetables, skim milk from friends home delivered meals; Infrequently drinking alcohol - does like [employer] zero        NUTRITION INTERVENTION:    Nutrition 30  minute one-on-one education & goal setting with Desi Norton       Reviewed weight history and patient's verbalized weight goal as well as any real or perceived barriers to obtaining the goal. Collaborated with patient to set a specific short and long term weight goal.       Conducted a verbal diet recall. Assessed for environmental, financial, psychosocial, physical and comorbidities that may impact the food and eating patterns / behaviors of Desi Norton       Collaborated with patient to set specific nutrient goals as well as specific food / behavior changes that will help patient meet the overall goal of following a heart healthy eating pattern (using guidelines as set forth by the American Heart Association and modeled after healthful eating patterns as recognized by the USDA Dietary Guidelines such as DASH, Mediterranean or plant-based). Briefly reviewed with Desi Norton the nutrition information: carbohydrates in alcohol and MNT  omega 3 and omega 6 information  and encouraged Desi Norton to read thoroughly, ask questions as needed, and use for future reference for heart healthy nutrition information. Desi Norton is not scheduled to participate in Cardiac Rehab group nutrition class.       PATIENT GOALS:     Weight Goals:Patient will try to maintain current weight within 5 pounds.      Short/Long term goal: 169 pounds +/- 5 pounds.         Nutrition Goals:Patient will try to follow my plate dietary guidelines.      Daily Recommendations:Patient will eat at least 2 meals and 2 /snacks per day to meet estimated needs.      Calories: 2000 calories /day * weight loss not appropriate at this time.      (using [address] (18   with AF 1.3 )     Estimated Total Protein needs: 60 to 75 grams/day ( based one gram/kg IBW)     Estimated Total Fluid needs: 1900  2300 ml/day ( based on 25 to 30 ml/kg actual body weight)       Saturated Fat: no more than 20  g/day     Trans Fat: 0 g/day     Sodium: no more than 1500  mg/day     Fruit: 3  cups / day     Vegetables: 2  cups/day        Other:    - read and compare food labels    Patient is a phase II participant and ITP done by exercise physiologist.     Questions addressed. Follow-up plans discussed. Patient will be followed up per pt, PRN. Contact information provided. Genevieve Sparks verbalized understanding. Cari Darling MA, RDN, LD  Contact phone: [phone].

## 2022-06-27 ENCOUNTER — HOSPITAL ENCOUNTER (OUTPATIENT)
Dept: CARDIAC REHAB | Age: 67
Setting detail: THERAPIES SERIES
Discharge: HOME | End: 2022-06-27
Payer: COMMERCIAL

## 2022-06-27 PROCEDURE — 93798 PHYS/QHP OP CAR RHAB W/ECG: CPT

## 2022-06-29 ENCOUNTER — HOSPITAL ENCOUNTER (OUTPATIENT)
Dept: CARDIAC REHAB | Age: 67
Setting detail: THERAPIES SERIES
Discharge: HOME | End: 2022-06-29
Payer: COMMERCIAL

## 2022-06-29 PROCEDURE — 93798 PHYS/QHP OP CAR RHAB W/ECG: CPT

## 2022-07-30 ENCOUNTER — TELEPHONE (OUTPATIENT)
Dept: CARDIAC REHAB | Age: 67
End: 2022-07-30

## 2022-08-09 NOTE — PROGRESS NOTES
Carlin Kaur 21/36 has completed 21/36 telemetry monitored exercise sessions. He has been a no call, no show since 6/29/2022 and has not returned our calls. We are discharging him at this time. We would be happy to accommodate him if he would like to return to Cardiac Rehab. Upon request, adetailed summary of his sessions can be sent for your review. Please call [facility] at [phone] with any questions or concerns. Thank you for allowing us to care for your patient.

## 2022-10-13 ENCOUNTER — OFFICE VISIT (OUTPATIENT)
Dept: CARDIOLOGY CLINIC | Age: 67
End: 2022-10-13
Payer: COMMERCIAL

## 2022-10-13 VITALS
WEIGHT: 166.4 LBS | DIASTOLIC BLOOD PRESSURE: 70 MMHG | HEIGHT: 70 IN | BODY MASS INDEX: 23.82 KG/M2 | RESPIRATION RATE: 15 BRPM | SYSTOLIC BLOOD PRESSURE: 102 MMHG | HEART RATE: 48 BPM

## 2022-10-13 DIAGNOSIS — Z95.1 S/P CABG (CORONARY ARTERY BYPASS GRAFT): ICD-10-CM

## 2022-10-13 DIAGNOSIS — Z95.2 S/P AVR: ICD-10-CM

## 2022-10-13 DIAGNOSIS — I25.10 CAD IN NATIVE ARTERY: Primary | ICD-10-CM

## 2022-10-13 DIAGNOSIS — E78.00 HYPERCHOLESTEREMIA: ICD-10-CM

## 2022-10-13 PROCEDURE — 93000 ELECTROCARDIOGRAM COMPLETE: CPT | Performed by: INTERNAL MEDICINE

## 2022-10-13 PROCEDURE — 99213 OFFICE O/P EST LOW 20 MIN: CPT | Performed by: INTERNAL MEDICINE

## 2022-10-13 PROCEDURE — 1123F ACP DISCUSS/DSCN MKR DOCD: CPT | Performed by: INTERNAL MEDICINE

## 2022-10-13 RX ORDER — FERROUS SULFATE 325(65) MG
TABLET ORAL
COMMUNITY
Start: 2022-09-17

## 2022-10-13 NOTE — PROGRESS NOTES
Patient Active Problem List   Diagnosis    Severe aortic stenosis    Psoriasis    Bicuspid aortic valve    Immunosuppression due to drug therapy ([facility] [address]    CAD in native artery       Current Outpatient Medications   Medication Sig Dispense Refill    ferrous sulfate (IRON 325) 325 (65 Fe) MG tablet TAKE 1 TABLET BY MOUTH DAILY      rosuvastatin (CRESTOR) 20 MG tablet Take 1 tablet by mouth daily 30 tablet 5    metoprolol tartrate (LOPRESSOR) 25 MG tablet Take 1 tablet by mouth 2 times daily 60 tablet 5    clopidogrel (PLAVIX) 75 MG tablet Take 1 tablet by mouth daily 30 tablet 5    ascorbic acid (VITAMIN C) 500 MG tablet Take 1 tablet by mouth 2 times daily 60 tablet 0     No current facility-administered medications for this visit. CC:    Patient is seen in follow up for:  1. CAD in native artery    2. S/P AVR    3. S/P CABG (coronary artery bypass graft)    4. Hypercholesteremia        HPI:  Patient is doing well without any specific cardiac problems. Patient denies any shortness of breath, chest pain, lightheadedness or dizziness. Patient is tolerating medications well without side effects.         ROS:   General: No unusual weight gain, no change in exercise tolerance  Skin: No rash or itching  EENT: No vision changes or nosebleeds  Cardiovascular: No orthopnea or paroxysmal nocturnal dyspnea  Respiratory: No cough or hemoptysis  Gastrointestinal: No hematemesis or recent changes in bowel habits  Genitourinary: No hematuria, urgency or frequency  Musculoskeletal: No muscular weakness or joint swelling   Neurologic / Psychiatric: No incoordination or convulsions  Allergic / Immunologic/ Lymphatic / Endocrine: No anemia or bleeding tendency    Social History     Socioeconomic History    Marital status: Single     Spouse name: Not on file    Number of children: Not on file    Years of education: Not on file    Highest education level: Not on file   Occupational History    Not on file   Tobacco Use    Smoking status: Former     Types: Cigars     Quit date: 2022     Years since quittin.6    Smokeless tobacco: Never    Tobacco comments:     occasionally a cigar; none in the last 3 months   Vaping Use    Vaping Use: Never used   Substance and Sexual Activity    Alcohol use: Yes     Comment: occasionally beer during week    Drug use: Never    Sexual activity: Not on file   Other Topics Concern    Not on file   Social History Narrative    Not on file     Social Determinants of Health     Financial Resource Strain: Not on file   Food Insecurity: Not on file   Transportation Needs: Not on file   Physical Activity: Not on file   Stress: Not on file   Social Connections: Not on file   Intimate Partner Violence: Not on file   Housing Stability: Not on file       Family History   Problem Relation Age of Onset    Cancer Mother         breast    Heart Disease Father     Heart Attack Father        Past Medical History:   Diagnosis Date    Aortic stenosis     CAD in native artery 2022    Hyperlipidemia        PHYSICAL EXAM:  CONSTITUTIONAL:  Well developed, well nourished    Vitals:    10/13/22 1102   BP: 102/70   Pulse: (!) 48   Resp: 15   Weight: 166 lb 6.4 oz (75.5 kg)   Height: 5' 10\" (1.778 m)     HEAD & FACE: Normocephalic. Symmetric. EYES: No xanthelasma. Conjunctivae not injected. EARS, NOSE, MOUTH & THROAT: Good dentition. No oral pallor or cyanosis. NECK: No JVD at 30 degrees. No thyromegaly. RESPIRATORY: Clear to auscultation and percussion in all fields. No use of accessory muscle or intercostal retractions. CARDIOVASCULAR: Regular rate and rhythm. No lifts or thrills on palpitation. Auscultation with normal S1-S2 in intensity and splitting. No carotid bruits. Abdominal aorta not enlarged. Femoral arteries without bruits. Pedal pulses 2+. No edema. ABDOMEN: Soft without hepatic or splenic enlargement. No tenderness. MUSCULOSKELETAL: No kyphosis or scoliosis of the back.   Good muscle strength and tone. No muscle atrophy. Normal gait and ability to undergo exercise stress testing. EXTREMITIES: No clubbing or cyanosis. SKIN: No Xanthomas or ulcerations. NEUROLOGIC: Oriented to time, place and person. Normal mood and affect. LYMPHATIC:  No palpable neck or supraclavicular nodes. No splenomegaly. EKG: the EKG tracing was reviewed and found to reveal: [place]jas sinus rhythm. No change compared to prior tracing.  ms. ASSESSMENT:                                                     ORDERS:       Diagnosis Orders   1. CAD in native artery  EKG 12 Lead      2. S/P AVR        3. S/P CABG (coronary artery bypass graft)        4. Hypercholesteremia          Above assessment cardiac issues stable. PLAN:   See above orders. Medication reconciliation completed. Discussed importance of SBE prophylaxis. Discussed issues that would prompt earlier evaluation. Same cardiac medications. Follow-up office visit in 12 months.

## 2022-10-15 RX ORDER — ROSUVASTATIN CALCIUM 20 MG/1
TABLET, COATED ORAL
Qty: 30 TABLET | Refills: 5 | Status: SHIPPED | OUTPATIENT
Start: 2022-10-15

## 2022-10-17 RX ORDER — CLOPIDOGREL BISULFATE 75 MG/1
TABLET ORAL
Qty: 30 TABLET | Refills: 5 | Status: SHIPPED | OUTPATIENT
Start: 2022-10-17

## 2023-05-11 RX ORDER — ROSUVASTATIN CALCIUM 20 MG/1
TABLET, COATED ORAL
Qty: 30 TABLET | Refills: 5 | Status: SHIPPED | OUTPATIENT
Start: 2023-05-11

## 2023-05-23 RX ORDER — CLOPIDOGREL BISULFATE 75 MG/1
TABLET ORAL
Qty: 30 TABLET | Refills: 5 | Status: SHIPPED | OUTPATIENT
Start: 2023-05-23

## 2023-07-20 NOTE — CARE COORDINATION
POD#2 AVR, MV repair, CABG x 3. CTs and wires cont. On 2l/nc. Met with pt and pt's sister, Satnam Irene, in room. Pt stated he will need to go to rehab because he does not have anyone to stay with 24/7 with him after dc and his house is being remodeled. Cho[facility] #[facility] - referral made to [facility] ([facility]) [place]. Pt has [employer] coverage and is aware his insurance with need to approve [facility]. I told him to continue to work on setting up friend or family to stay with him after dc just in case of denial from his insurance to go to [facility]. Will setup hhc. No preference for hhc and was agreeable to [facility]. Referral was made to Barry Jimenez at [facility] and they will follow pt.  Sw/cm will follow PAST MEDICAL HISTORY:  No pertinent past medical history     No pertinent past medical history

## 2023-10-12 ENCOUNTER — OFFICE VISIT (OUTPATIENT)
Dept: CARDIOLOGY CLINIC | Age: 68
End: 2023-10-12
Payer: MEDICARE

## 2023-10-12 VITALS
BODY MASS INDEX: 24.92 KG/M2 | RESPIRATION RATE: 18 BRPM | HEART RATE: 57 BPM | HEIGHT: 70 IN | DIASTOLIC BLOOD PRESSURE: 80 MMHG | WEIGHT: 174.1 LBS | SYSTOLIC BLOOD PRESSURE: 152 MMHG

## 2023-10-12 DIAGNOSIS — Q23.1 BICUSPID AORTIC VALVE: ICD-10-CM

## 2023-10-12 DIAGNOSIS — I25.10 CAD IN NATIVE ARTERY: ICD-10-CM

## 2023-10-12 DIAGNOSIS — E78.00 HYPERCHOLESTEREMIA: ICD-10-CM

## 2023-10-12 DIAGNOSIS — Z95.2 S/P AVR: Primary | ICD-10-CM

## 2023-10-12 DIAGNOSIS — Z95.1 S/P CABG (CORONARY ARTERY BYPASS GRAFT): ICD-10-CM

## 2023-10-12 PROCEDURE — G8427 DOCREV CUR MEDS BY ELIG CLIN: HCPCS | Performed by: INTERNAL MEDICINE

## 2023-10-12 PROCEDURE — G8420 CALC BMI NORM PARAMETERS: HCPCS | Performed by: INTERNAL MEDICINE

## 2023-10-12 PROCEDURE — 1036F TOBACCO NON-USER: CPT | Performed by: INTERNAL MEDICINE

## 2023-10-12 PROCEDURE — 3017F COLORECTAL CA SCREEN DOC REV: CPT | Performed by: INTERNAL MEDICINE

## 2023-10-12 PROCEDURE — 93000 ELECTROCARDIOGRAM COMPLETE: CPT | Performed by: INTERNAL MEDICINE

## 2023-10-12 PROCEDURE — G8484 FLU IMMUNIZE NO ADMIN: HCPCS | Performed by: INTERNAL MEDICINE

## 2023-10-12 PROCEDURE — 1123F ACP DISCUSS/DSCN MKR DOCD: CPT | Performed by: INTERNAL MEDICINE

## 2023-10-12 PROCEDURE — 99214 OFFICE O/P EST MOD 30 MIN: CPT | Performed by: INTERNAL MEDICINE

## 2023-10-12 RX ORDER — ROSUVASTATIN CALCIUM 20 MG/1
20 TABLET, COATED ORAL DAILY
Qty: 30 TABLET | Refills: 5 | Status: SHIPPED | OUTPATIENT
Start: 2023-10-12

## 2023-10-12 RX ORDER — ADALIMUMAB 40MG/0.4ML
KIT SUBCUTANEOUS
COMMUNITY

## 2023-10-12 RX ORDER — ROSUVASTATIN CALCIUM 20 MG/1
20 TABLET, COATED ORAL DAILY
Qty: 30 TABLET | Refills: 5 | Status: CANCELLED | OUTPATIENT
Start: 2023-10-12

## 2023-10-12 RX ORDER — CLOPIDOGREL BISULFATE 75 MG/1
75 TABLET ORAL DAILY
Qty: 30 TABLET | Refills: 5 | Status: CANCELLED | OUTPATIENT
Start: 2023-10-12

## 2023-10-12 RX ORDER — CLOPIDOGREL BISULFATE 75 MG/1
75 TABLET ORAL DAILY
Qty: 30 TABLET | Refills: 5 | Status: SHIPPED | OUTPATIENT
Start: 2023-10-12

## 2023-10-12 NOTE — PROGRESS NOTES
without bruits. Pedal pulses 2+. No edema. ABDOMEN: Soft without hepatic or splenic enlargement. No tenderness. MUSCULOSKELETAL: No kyphosis or scoliosis of the back. Good muscle strength and tone. No muscle atrophy. Normal gait and ability to undergo exercise stress testing. EXTREMITIES: No clubbing or cyanosis. SKIN: No Xanthomas or ulcerations. NEUROLOGIC: Oriented to time, place and person. Normal mood and affect. LYMPHATIC:  No palpable neck or supraclavicular nodes. No splenomegaly. EKG: the EKG tracing was reviewed and found to reveal: [place] sinus rhythm. No change compared to prior tracing.  ms. ASSESSMENT:                                                     ORDERS:       Diagnosis Orders   1. S/P AVR  EKG 12 lead      2. S/P CABG (coronary artery bypass graft)        3. Hypercholesteremia        4. CAD in native artery  EKG 12 lead      5. Bicuspid aortic valve          Above assessment cardiac issues stable. PLAN:   See above orders. Medication reconciliation completed. Discussed importance of SBE prophylaxis. Labs reviewed: Creatinine 0.7  Testing reviewed: EF 60%  Note for schoolbus driving completed. Discussed issues that would prompt earlier evaluation. Same cardiac medications. Follow-up office visit in 12 months.

## 2023-11-17 RX ORDER — ROSUVASTATIN CALCIUM 20 MG/1
20 TABLET, COATED ORAL DAILY
Qty: 90 TABLET | Refills: 1 | Status: SHIPPED | OUTPATIENT
Start: 2023-11-17

## 2024-01-26 ENCOUNTER — HOSPITAL ENCOUNTER (OUTPATIENT)
Dept: ULTRASOUND IMAGING | Age: 69
End: 2024-01-26
Payer: MEDICARE

## 2024-01-26 DIAGNOSIS — Z13.6 ENCOUNTER FOR SCREENING FOR CARDIOVASCULAR DISORDERS: ICD-10-CM

## 2024-01-26 PROCEDURE — 76775 US EXAM ABDO BACK WALL LIM: CPT

## 2024-03-04 RX ORDER — CLOPIDOGREL BISULFATE 75 MG/1
75 TABLET ORAL DAILY
Qty: 30 TABLET | Refills: 5 | Status: SHIPPED | OUTPATIENT
Start: 2024-03-04

## 2024-09-20 RX ORDER — ROSUVASTATIN CALCIUM 20 MG/1
20 TABLET, COATED ORAL DAILY
Qty: 30 TABLET | Refills: 5 | Status: SHIPPED | OUTPATIENT
Start: 2024-09-20

## 2024-10-15 ENCOUNTER — OFFICE VISIT (OUTPATIENT)
Dept: CARDIOLOGY CLINIC | Age: 69
End: 2024-10-15
Payer: MEDICARE

## 2024-10-15 VITALS
RESPIRATION RATE: 18 BRPM | HEIGHT: 70 IN | DIASTOLIC BLOOD PRESSURE: 64 MMHG | BODY MASS INDEX: 24.62 KG/M2 | SYSTOLIC BLOOD PRESSURE: 124 MMHG | HEART RATE: 68 BPM | WEIGHT: 172 LBS

## 2024-10-15 DIAGNOSIS — Z95.2 S/P AVR: ICD-10-CM

## 2024-10-15 DIAGNOSIS — E78.00 HYPERCHOLESTEREMIA: ICD-10-CM

## 2024-10-15 DIAGNOSIS — I25.10 CAD IN NATIVE ARTERY: Primary | ICD-10-CM

## 2024-10-15 DIAGNOSIS — Q23.81 BICUSPID AORTIC VALVE: ICD-10-CM

## 2024-10-15 PROCEDURE — 1036F TOBACCO NON-USER: CPT | Performed by: INTERNAL MEDICINE

## 2024-10-15 PROCEDURE — G8427 DOCREV CUR MEDS BY ELIG CLIN: HCPCS | Performed by: INTERNAL MEDICINE

## 2024-10-15 PROCEDURE — G8484 FLU IMMUNIZE NO ADMIN: HCPCS | Performed by: INTERNAL MEDICINE

## 2024-10-15 PROCEDURE — 93000 ELECTROCARDIOGRAM COMPLETE: CPT | Performed by: INTERNAL MEDICINE

## 2024-10-15 PROCEDURE — 1123F ACP DISCUSS/DSCN MKR DOCD: CPT | Performed by: INTERNAL MEDICINE

## 2024-10-15 PROCEDURE — 99214 OFFICE O/P EST MOD 30 MIN: CPT | Performed by: INTERNAL MEDICINE

## 2024-10-15 PROCEDURE — G8420 CALC BMI NORM PARAMETERS: HCPCS | Performed by: INTERNAL MEDICINE

## 2024-10-15 PROCEDURE — 3017F COLORECTAL CA SCREEN DOC REV: CPT | Performed by: INTERNAL MEDICINE

## 2024-10-15 NOTE — PROGRESS NOTES
Patient Active Problem List   Diagnosis    Severe aortic stenosis    Psoriasis    Bicuspid aortic valve    Immunosuppression due to drug therapy ([facility])    CAD in native artery       Current Outpatient Medications   Medication Sig Dispense Refill    rosuvastatin (CRESTOR) 20 MG tablet TAKE ONE TABLET BY MOUTH EVERY DAY 30 tablet 5    clopidogrel (PLAVIX) 75 MG tablet Take 1 tablet by mouth daily 30 tablet 5    metoprolol tartrate (LOPRESSOR) 25 MG tablet Take 1 tablet by mouth 2 times daily 180 tablet 3    ascorbic acid (VITAMIN C) 500 MG tablet Take 1 tablet by mouth 2 times daily 60 tablet 0     No current facility-administered medications for this visit.       CC:    Patient is seen in follow up for:  1. CAD in native artery    2. Bicuspid aortic valve    3. S/P AVR    4. S/P CABG (coronary artery bypass graft)    5. Hypercholesteremia        HPI:  Patient is doing well without any specific cardiac problems.  Patient denies any shortness of breath, chest pain, lightheadedness or dizziness.  Patient is tolerating medications well without side effects.    Relates needing echo for DOT license.    ROS:   General: No unusual weight gain, no change in exercise tolerance  Skin: No rash or itching  EENT: No vision changes or nosebleeds  Cardiovascular: No orthopnea or paroxysmal nocturnal dyspnea  Respiratory: No cough or hemoptysis  Gastrointestinal: No hematemesis or recent changes in bowel habits  Genitourinary: No hematuria, urgency or frequency  Musculoskeletal: No muscular weakness or joint swelling   Neurologic / Psychiatric: No incoordination or convulsions  Allergic / Immunologic/ Lymphatic / Endocrine: No anemia or bleeding tendency    Social History     Socioeconomic History    Marital status: Single     Spouse name: Not on file    Number of children: Not on file    Years of education: Not on file    Highest education level: Not on file   Occupational History    Not on file   Tobacco Use    Smoking status:

## 2024-10-23 RX ORDER — CLOPIDOGREL BISULFATE 75 MG/1
75 TABLET ORAL DAILY
Qty: 30 TABLET | Refills: 5 | Status: SHIPPED | OUTPATIENT
Start: 2024-10-23

## 2024-12-16 ENCOUNTER — HOSPITAL ENCOUNTER (OUTPATIENT)
Dept: CARDIOLOGY | Age: 69
Discharge: HOME | End: 2024-12-18
Attending: INTERNAL MEDICINE
Payer: MEDICARE

## 2024-12-16 VITALS
WEIGHT: 172 LBS | DIASTOLIC BLOOD PRESSURE: 64 MMHG | HEIGHT: 70 IN | SYSTOLIC BLOOD PRESSURE: 124 MMHG | BODY MASS INDEX: 24.62 KG/M2

## 2024-12-16 DIAGNOSIS — Z95.2 S/P AVR: ICD-10-CM

## 2024-12-16 LAB
ECHO AO ASC DIAM: 3.2 CM
ECHO AO ASCENDING AORTA INDEX: 1.63 CM/M2
ECHO AV AREA PEAK VELOCITY: 1 CM2
ECHO AV AREA VTI: 1.1 CM2
ECHO AV AREA/BSA PEAK VELOCITY: 0.5 CM2/M2
ECHO AV AREA/BSA VTI: 0.6 CM2/M2
ECHO AV CUSP MM: 1.9 CM
ECHO AV MEAN GRADIENT: 17 MMHG
ECHO AV MEAN VELOCITY: 1.9 M/S
ECHO AV PEAK GRADIENT: 36 MMHG
ECHO AV PEAK VELOCITY: 3 M/S
ECHO AV VELOCITY RATIO: 0.33
ECHO AV VTI: 66.4 CM
ECHO BSA: 1.96 M2
ECHO EST RA PRESSURE: 3 MMHG
ECHO IVC PROX: 1.8 CM
ECHO LA DIAMETER INDEX: 1.99 CM/M2
ECHO LA DIAMETER: 3.9 CM
ECHO LA VOL A-L A2C: 56 ML (ref 18–58)
ECHO LA VOL A-L A4C: 41 ML (ref 18–58)
ECHO LA VOL MOD A2C: 51 ML (ref 18–58)
ECHO LA VOL MOD A4C: 38 ML (ref 18–58)
ECHO LA VOLUME AREA LENGTH: 50 ML
ECHO LA VOLUME INDEX A-L A2C: 29 ML/M2 (ref 16–34)
ECHO LA VOLUME INDEX A-L A4C: 21 ML/M2 (ref 16–34)
ECHO LA VOLUME INDEX AREA LENGTH: 26 ML/M2 (ref 16–34)
ECHO LA VOLUME INDEX MOD A2C: 26 ML/M2 (ref 16–34)
ECHO LA VOLUME INDEX MOD A4C: 19 ML/M2 (ref 16–34)
ECHO LV EF PHYSICIAN: 60 %
ECHO LV FRACTIONAL SHORTENING: 36 % (ref 28–44)
ECHO LV INTERNAL DIMENSION DIASTOLE INDEX: 2.4 CM/M2
ECHO LV INTERNAL DIMENSION DIASTOLIC: 4.7 CM (ref 4.2–5.9)
ECHO LV INTERNAL DIMENSION SYSTOLIC INDEX: 1.53 CM/M2
ECHO LV INTERNAL DIMENSION SYSTOLIC: 3 CM
ECHO LV ISOVOLUMETRIC RELAXATION TIME (IVRT): 83 MS
ECHO LV IVSD: 1 CM (ref 0.6–1)
ECHO LV IVSS: 1 CM
ECHO LV MASS 2D: 132.3 G (ref 88–224)
ECHO LV MASS INDEX 2D: 67.5 G/M2 (ref 49–115)
ECHO LV POSTERIOR WALL DIASTOLIC: 0.7 CM (ref 0.6–1)
ECHO LV POSTERIOR WALL SYSTOLIC: 1.1 CM
ECHO LV RELATIVE WALL THICKNESS RATIO: 0.3
ECHO LVOT AREA: 3.1 CM2
ECHO LVOT AV VTI INDEX: 0.36
ECHO LVOT DIAM: 2 CM
ECHO LVOT MEAN GRADIENT: 2 MMHG
ECHO LVOT PEAK GRADIENT: 4 MMHG
ECHO LVOT PEAK VELOCITY: 1 M/S
ECHO LVOT STROKE VOLUME INDEX: 38.4 ML/M2
ECHO LVOT SV: 75.4 ML
ECHO LVOT VTI: 24 CM
ECHO MV "A" WAVE DURATION: 120 MSEC
ECHO MV A VELOCITY: 0.43 M/S
ECHO MV AREA PHT: 3.6 CM2
ECHO MV AREA VTI: 2.9 CM2
ECHO MV E DECELERATION TIME (DT): 270.8 MS
ECHO MV E VELOCITY: 0.88 M/S
ECHO MV E/A RATIO: 2.05
ECHO MV LVOT VTI INDEX: 1.08
ECHO MV MAX VELOCITY: 0.8 M/S
ECHO MV MEAN GRADIENT: 1 MMHG
ECHO MV MEAN VELOCITY: 0.4 M/S
ECHO MV PEAK GRADIENT: 2 MMHG
ECHO MV PRESSURE HALF TIME (PHT): 60.6 MS
ECHO MV VTI: 25.9 CM
ECHO PV MAX VELOCITY: 0.9 M/S
ECHO PV MEAN GRADIENT: 2 MMHG
ECHO PV MEAN VELOCITY: 0.6 M/S
ECHO PV PEAK GRADIENT: 4 MMHG
ECHO PV VTI: 22.4 CM
ECHO PVEIN A DURATION: 161.5 MS
ECHO PVEIN A VELOCITY: 0.3 M/S
ECHO PVEIN PEAK D VELOCITY: 0.7 M/S
ECHO PVEIN PEAK S VELOCITY: 0.6 M/S
ECHO PVEIN S/D RATIO: 0.9
ECHO RIGHT VENTRICULAR SYSTOLIC PRESSURE (RVSP): 10 MMHG
ECHO RV INTERNAL DIMENSION: 3.8 CM
ECHO RV TAPSE: 1.3 CM (ref 1.7–?)
ECHO TV REGURGITANT MAX VELOCITY: 1.36 M/S
ECHO TV REGURGITANT PEAK GRADIENT: 7 MMHG

## 2024-12-16 PROCEDURE — 93306 TTE W/DOPPLER COMPLETE: CPT | Performed by: INTERNAL MEDICINE

## 2024-12-16 PROCEDURE — 93306 TTE W/DOPPLER COMPLETE: CPT

## 2024-12-17 ENCOUNTER — TELEPHONE (OUTPATIENT)
Dept: CARDIOLOGY CLINIC | Age: 69
End: 2024-12-17

## 2024-12-17 NOTE — TELEPHONE ENCOUNTER
----- Message from Dr. Otto Delacruz MD sent at 12/17/2024  7:40 AM EST -----  Please let patient know that echo is OK. Some narrowing of the artificial valve.  Office visit follow-up in 1 year from last visit.

## 2025-03-17 RX ORDER — METOPROLOL TARTRATE 25 MG/1
25 TABLET, FILM COATED ORAL 2 TIMES DAILY
Qty: 180 TABLET | Refills: 0 | Status: SHIPPED | OUTPATIENT
Start: 2025-03-17

## 2025-05-15 RX ORDER — ROSUVASTATIN CALCIUM 20 MG/1
20 TABLET, COATED ORAL DAILY
Qty: 90 TABLET | Refills: 1 | Status: SHIPPED | OUTPATIENT
Start: 2025-05-15

## 2025-06-05 RX ORDER — CLOPIDOGREL BISULFATE 75 MG/1
75 TABLET ORAL DAILY
Qty: 30 TABLET | Refills: 0 | Status: SHIPPED | OUTPATIENT
Start: 2025-06-05

## 2025-06-16 RX ORDER — METOPROLOL TARTRATE 25 MG/1
25 TABLET, FILM COATED ORAL 2 TIMES DAILY
Qty: 180 TABLET | Refills: 1 | Status: SHIPPED | OUTPATIENT
Start: 2025-06-16

## 2025-06-20 ENCOUNTER — TELEPHONE (OUTPATIENT)
Dept: CARDIOLOGY CLINIC | Age: 70
End: 2025-06-20

## 2025-06-20 NOTE — TELEPHONE ENCOUNTER
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  He stated that since being seen in December 2024 he has had no chest pain, sob, palpitations or lighheadedness.  He can do all his daily activities such as walk a block or climbing a flight of stairs with no symptoms.

## 2025-07-09 RX ORDER — CLOPIDOGREL BISULFATE 75 MG/1
75 TABLET ORAL DAILY
Qty: 30 TABLET | Refills: 5 | Status: SHIPPED | OUTPATIENT
Start: 2025-07-09

## (undated) DEVICE — INSUFFLATION TUBING SET WITH FILTER, FUNNEL CONNECTOR AND LUER LOCK: Brand: JOSNOE MEDICAL INC

## (undated) DEVICE — SOLUTION IRRIG 1000ML 0.9% SOD CHL USP POUR PLAS BTL

## (undated) DEVICE — CAMERA CARDIAC STRYKER 1488 HD

## (undated) DEVICE — TOWEL,OR,DSP,ST,WHITE,DLX,4/PK,20PK/CS: Brand: MEDLINE

## (undated) DEVICE — CARDIAC STRYKER STERNAL SAW

## (undated) DEVICE — RETRACTOR RULTRACT INTERN MAMMARY ARTERY

## (undated) DEVICE — PACK OPEN HRT DRP

## (undated) DEVICE — AGENT HEMSTAT W4XL8IN OXIDIZED REGENERATED CELOS ABSRB

## (undated) DEVICE — SET CARDIAC II

## (undated) DEVICE — CONNECTOR PERF 0.25X0.25IN STR W/O LUERLOCK

## (undated) DEVICE — ADHESIVE SKIN CLOSURE TOP 36 CC HI VISC DERMBND MINI

## (undated) DEVICE — 1.5L THIN WALL CAN: Brand: CRD

## (undated) DEVICE — CATHETER IV 24GA L3/4IN PERIPH YEL S STL POLYUR PLAS HUB

## (undated) DEVICE — BATTERY PACK FOR VARISPEED: Brand: STRYKER VARISPEED

## (undated) DEVICE — SET CARDIAC I

## (undated) DEVICE — CLOTH SURG PREP PREOPERATIVE CHLORHEXIDINE GLUC 2% READYPREP

## (undated) DEVICE — LABEL MED CARD SURG 4 IN PANEL STRL

## (undated) DEVICE — BASIC SINGLE BASIN 1-LF: Brand: MEDLINE INDUSTRIES, INC.

## (undated) DEVICE — BLOOD TRANSFUSION FILTER: Brand: HAEMONETICS

## (undated) DEVICE — Z DISCONTINUED PER MEDLINE USE 2425483 TAPE UMB L30IN DIA1/8IN WHT COT NONABSORBABLE W/O NDL FOR

## (undated) DEVICE — PADDLE INTERN DEFIB CHILD

## (undated) DEVICE — GLOVE SURG SZ 6 THK91MIL LTX FREE SYN POLYISOPRENE ANTI

## (undated) DEVICE — TUBING PERF PMP L10FT OD0.25IN ID1/16IN MED CLASS VI PRECUT

## (undated) DEVICE — SOLUTION IV 50ML 0.9% SOD CHL PLAS CONT USP VIAFLX

## (undated) DEVICE — CLIP INT SM TI EZ LD LIG SYS WECK HORZ

## (undated) DEVICE — PERFUSION PACK OPN HRT

## (undated) DEVICE — CATHETER THOR 32FR L23IN PVC 5 EYELET STR ATRAUM

## (undated) DEVICE — SOLUTION IV 1000ML 140MEQ/L SOD 5MEQ/L K 3MEQ/L MG 27MEQ/L

## (undated) DEVICE — SOLUTION IRRIG 1000ML STRL H2O USP PLAS POUR BTL

## (undated) DEVICE — TOWEL,OR,DSP,ST,BLUE,STD,6/PK,12PK/CS: Brand: MEDLINE

## (undated) DEVICE — STERILE LATEX POWDER FREE SURGICAL GLOVES WITH HYDROGEL COATING: Brand: PROTEXIS

## (undated) DEVICE — BLOWER COR ART L16.5CM PLAS SHFT MAL W/ MIST IV SET AXIUS

## (undated) DEVICE — TUBING, SUCTION, 3/16" X 12', STRAIGHT: Brand: MEDLINE

## (undated) DEVICE — AORTIC PUNCHES ARE USED TO CREATE A UNIFORM OPENING IN BLOOD VESSELS DURING CARDIOVASCULAR SURGERY. THE VESSEL GRAFT IS INSERTED INTO THE CREATED OPENING AND SUTURED TO THE VESSEL WALL. AORTIC LANCETS ARE USED TO MAKE A SMALL UNIFORM CUT IN A BLOOD VESSEL TO FACILITATE INSERTION OF AN AORTIC PUNCH.  PUNCHES COME IN VARIOUS LENGTHS, DIAMETERS AND TIP CONFIGURATIONS.: Brand: CLEANCUT ROTATING AORTIC PUNCH

## (undated) DEVICE — DRESSING FOAM W22XL25CM FILVE LAYR FOAM DP DEF SAFETAC

## (undated) DEVICE — TTL1LYR 16FR10ML 100%SIL TMPST TR: Brand: MEDLINE

## (undated) DEVICE — CONNECTOR PERF W64XH64XL64MM W  LUERLOCK

## (undated) DEVICE — CANNULA PERF 7FR L5.5IN AORT ROOT RADPQ STD TIP W/ VENT LN

## (undated) DEVICE — GOWN,SIRUS,FABRNF,L,20/CS: Brand: MEDLINE

## (undated) DEVICE — U-SHAPED STYLE, SILICONE (1 PER STERILE PKG): Brand: SCANLAN® A/C LOCATOR® GRAFT MARKER

## (undated) DEVICE — GOWN,SIRUS,FABRNF,XL,20/CS: Brand: MEDLINE

## (undated) DEVICE — GLOVE ORANGE PI 7   MSG9070

## (undated) DEVICE — CANNULA INJ L2.5IN BLNT TIP 3MM CLR BODY W/ 1 W VLV DLP

## (undated) DEVICE — RETROGRADE CARDIOPLEGIA CATHETER: Brand: EDWARDS LIFESCIENCES RETROGRADE CARDIOPLEGIA CATHETER

## (undated) DEVICE — KIT PLT RATIO DISPNS KT 2IN CANN TIP SPRY TIP DISP MAGELLAN

## (undated) DEVICE — EZ GLIDE AORTIC CANNULA: Brand: EDWARDS LIFESCIENCES EZ GLIDE AORTIC CANNULA

## (undated) DEVICE — GRADUATE

## (undated) DEVICE — SURGICAL PROCEDURE PACK CRD SEHC

## (undated) DEVICE — CATHETER THOR 32FR L23IN PVC 6 EYELET STR ATRAUM

## (undated) DEVICE — Device

## (undated) DEVICE — 3M™ TEGADERM™ CHG DRESSING 25/CARTON 4 CARTONS/CASE 1657: Brand: TEGADERM™

## (undated) DEVICE — DRAPE THER FLUID WARMING 66X44 IN FLAT SLUSH DBL DISC ORS

## (undated) DEVICE — TAPE ADH W2INXL10YD WHT PAPR GENTLE BRTH FLX COMFORTABLE

## (undated) DEVICE — GLOVE SURG SZ 65 THK91MIL LTX FREE SYN POLYISOPRENE

## (undated) DEVICE — TAPE ADH W2INXL10YD PLAS TRNSPAR H2O RESIST HYPOALRG CURAD

## (undated) DEVICE — 6 FOOT DISPOSABLE EXTENSION CABLE WITH SAFE CONNECT / SCREW-DOWN

## (undated) DEVICE — GLOVE ORANGE PI 7 1/2   MSG9075

## (undated) DEVICE — ALCOHOL RUBBING ISO 16OZ 70%

## (undated) DEVICE — SET ACB VEIN

## (undated) DEVICE — DRAIN SURG SGL COLL PT TB FOR ATS BG OASIS

## (undated) DEVICE — TIP APPL GEL PLT ENDO 5MMX32CM

## (undated) DEVICE — SET SURG BASIN OPEN HEART NO  1 REUSABLE

## (undated) DEVICE — APPLICATOR MEDICATED 26 CC SOLUTION HI LT ORNG CHLORAPREP

## (undated) DEVICE — BLADE CLIPPER GEN PURP NS

## (undated) DEVICE — GLOVE SURG SZ 7.5 L11.73IN FNGR THK9.8MIL STRW LTX POLYMER